# Patient Record
Sex: FEMALE
[De-identification: names, ages, dates, MRNs, and addresses within clinical notes are randomized per-mention and may not be internally consistent; named-entity substitution may affect disease eponyms.]

---

## 2019-03-01 ENCOUNTER — TELEPHONE (OUTPATIENT)
Dept: SCHEDULING | Age: 64
End: 2019-03-01

## 2020-07-30 ENCOUNTER — LAB REQUISITION (OUTPATIENT)
Dept: LAB | Facility: HOSPITAL | Age: 65
End: 2020-07-30

## 2020-07-30 ENCOUNTER — OFFICE VISIT (OUTPATIENT)
Dept: INTERNAL MEDICINE | Facility: CLINIC | Age: 65
End: 2020-07-30

## 2020-07-30 VITALS
WEIGHT: 128.4 LBS | HEART RATE: 69 BPM | DIASTOLIC BLOOD PRESSURE: 84 MMHG | OXYGEN SATURATION: 98 % | RESPIRATION RATE: 16 BRPM | SYSTOLIC BLOOD PRESSURE: 160 MMHG | BODY MASS INDEX: 25.88 KG/M2 | HEIGHT: 59 IN | TEMPERATURE: 98 F

## 2020-07-30 DIAGNOSIS — E78.2 MIXED HYPERLIPIDEMIA: ICD-10-CM

## 2020-07-30 DIAGNOSIS — Z12.39 SCREENING FOR BREAST CANCER: ICD-10-CM

## 2020-07-30 DIAGNOSIS — Z00.00 ROUTINE GENERAL MEDICAL EXAMINATION AT A HEALTH CARE FACILITY: ICD-10-CM

## 2020-07-30 DIAGNOSIS — Z83.3 FAMILY HISTORY OF DIABETES MELLITUS TYPE II: ICD-10-CM

## 2020-07-30 DIAGNOSIS — M19.90 ARTHRITIS: ICD-10-CM

## 2020-07-30 DIAGNOSIS — I10 ESSENTIAL HYPERTENSION: Primary | ICD-10-CM

## 2020-07-30 DIAGNOSIS — R01.1 MURMUR, CARDIAC: ICD-10-CM

## 2020-07-30 DIAGNOSIS — Z12.11 SCREENING FOR COLON CANCER: ICD-10-CM

## 2020-07-30 DIAGNOSIS — E55.9 VITAMIN D DEFICIENCY: ICD-10-CM

## 2020-07-30 PROCEDURE — 99203 OFFICE O/P NEW LOW 30 MIN: CPT | Performed by: NURSE PRACTITIONER

## 2020-07-30 RX ORDER — NIFEDIPINE 60 MG/1
1 TABLET, FILM COATED, EXTENDED RELEASE ORAL DAILY
COMMUNITY
End: 2020-07-30 | Stop reason: SDUPTHER

## 2020-07-30 RX ORDER — NIFEDIPINE 60 MG/1
60 TABLET, FILM COATED, EXTENDED RELEASE ORAL DAILY
Qty: 90 TABLET | Refills: 1 | Status: SHIPPED | OUTPATIENT
Start: 2020-07-30 | End: 2021-01-12

## 2020-07-30 RX ORDER — AMOXICILLIN 500 MG/1
4 CAPSULE ORAL
COMMUNITY
End: 2021-03-08

## 2020-07-30 RX ORDER — BENAZEPRIL HYDROCHLORIDE 40 MG/1
1 TABLET, FILM COATED ORAL DAILY
COMMUNITY
End: 2020-07-30 | Stop reason: SDUPTHER

## 2020-07-30 RX ORDER — SIMVASTATIN 20 MG
20 TABLET ORAL DAILY
Qty: 90 TABLET | Refills: 1 | Status: SHIPPED | OUTPATIENT
Start: 2020-07-30 | End: 2020-08-31

## 2020-07-30 RX ORDER — MELOXICAM 15 MG/1
1 TABLET ORAL DAILY
COMMUNITY
Start: 2018-10-15 | End: 2020-08-31

## 2020-07-30 RX ORDER — BENAZEPRIL HYDROCHLORIDE 40 MG/1
40 TABLET, FILM COATED ORAL DAILY
Qty: 90 TABLET | Refills: 1 | Status: SHIPPED | OUTPATIENT
Start: 2020-07-30 | End: 2021-03-08 | Stop reason: SDUPTHER

## 2020-07-30 RX ORDER — SIMVASTATIN 20 MG
1 TABLET ORAL DAILY
COMMUNITY
End: 2020-07-30 | Stop reason: SDUPTHER

## 2020-07-30 NOTE — PROGRESS NOTES
Subjective   Greta Marino is a 65 y.o. female    Chief Complaint   Patient presents with   • Establish Care   • Hypertension   • Hyperlipidemia   • Arthritis   • Med Refill     History of Present Illness     New pt here to establish care.      HTN - chronic; pt has not had her BP med this AM.  She moved to [place] from [place] 6 months ago and has been spacing her BP meds out.  States that generally her BP is well controlled on Benazepril 40 mg daily along with Nifedipine 60 mg daily.      Murmur - found in 2005 while having a colonoscopy; originally had a echo, but it has not been repeated since 2005.      HL - chronic; current regimen is Simvastatin 20 mg daily.  No recent labs, but she is fasting this AM.      Arthritis - chronic; most pain is within the hand and bilateral knees.  Takes Meloxicam 15 mg PRN daily.  She did have a meniscus repair in 12/2018 and knee pain is much better since surgery.  She is aware Meloxicam can elevate her BP.      Past Medical History:   Diagnosis Date   • Arthritis    • Asthma    • Heart murmur    • History of mammography, screening 2017    Done in [place]    • History of Papanicolaou smear of cervix 2017    Done in [place]   • History of shingles 2015   • Hyperlipidemia    • Hypertension      Past Surgical History:   Procedure Laterality Date   • ANKLE SURGERY Left 1980's   • COLONOSCOPY  2005    Dr. Spence in [place], [place]   • KNEE SURGERY Right 12/20/2018    meniscus repair     Allergies   Allergen Reactions   • Peanut-Containing Drug Products Hives     Family History   Problem Relation Age of Onset   • Pancreatic cancer Mother         Passed away in 2002   • Diabetes Father    • Heart attack Father         passed in 2000   • Hypertension Father    • Colon cancer Father    • Diabetes Brother    • Hypertension Brother    • Hypertension Brother    • Stroke Brother    • Sarcoidosis Daughter      Social History     Socioeconomic History   • Marital status:      Spouse name:  Not on file   • Number of children: Not on file   • Years of education: Not on file   • Highest education level: Not on file   Tobacco Use   • Smoking status: Never Smoker   • Smokeless tobacco: Never Used   Substance and Sexual Activity   • Alcohol use: Yes     Frequency: Monthly or less     Comment: on occasion   • Drug use: Never         The following portions of the patient's history were reviewed and updated as appropriate: allergies, current medications, past family history, past medical history, past social history, past surgical history and problem list.    Current Outpatient Medications:   •  benazepril (LOTENSIN) 40 MG tablet, Take 1 tablet by mouth Daily., Disp: 90 tablet, Rfl: 1  •  meloxicam (MOBIC) 15 MG tablet, Take 1 tablet by mouth Daily., Disp: , Rfl:   •  NIFEdipine CC (ADALAT CC) 60 MG 24 hr tablet, Take 1 tablet by mouth Daily., Disp: 90 tablet, Rfl: 1  •  simvastatin (ZOCOR) 20 MG tablet, Take 1 tablet by mouth Daily., Disp: 90 tablet, Rfl: 1  •  amoxicillin (AMOXIL) 500 MG capsule, Take 4 capsules by mouth. 1 hour Prior to dental appoitment., Disp: , Rfl:      Review of Systems   Constitutional: Negative for chills, fatigue and fever.   Respiratory: Negative for cough, chest tightness and shortness of breath.    Cardiovascular: Negative for chest pain and palpitations.   Gastrointestinal: Negative for abdominal pain, diarrhea, nausea and vomiting.   Endocrine: Negative for cold intolerance and heat intolerance.   Musculoskeletal: Positive for arthralgias. Negative for neck pain.   Neurological: Negative for dizziness and headaches.       Objective   Physical Exam   Constitutional: She is oriented to person, place, and time. She appears well-developed and well-nourished.   HENT:   Head: Normocephalic and atraumatic.   Eyes: Pupils are equal, round, and reactive to light. Conjunctivae and EOM are normal.   Neck: Normal range of motion.   Cardiovascular: Normal rate and regular rhythm.   Murmur  "heard.   Systolic murmur is present with a grade of 3/6.  Pulmonary/Chest: Effort normal and breath sounds normal.   Abdominal: Soft. Bowel sounds are normal.   Musculoskeletal: Normal range of motion.   Neurological: She is alert and oriented to person, place, and time. She has normal reflexes.   Skin: Skin is warm and dry.   Psychiatric: She has a normal mood and affect. Her behavior is normal. Judgment and thought content normal.     Vitals:    07/30/20 1005 07/30/20 1130   BP: 170/72 160/84   Pulse: 69    Resp: 16    Temp: 98 °F (36.7 °C)    TempSrc: Infrared    SpO2: 98%    Weight: 58.2 kg (128 lb 6.4 oz)    Height: 149.4 cm (58.8\")          Assessment/Plan   Greta was seen today for establish care, hypertension, hyperlipidemia, arthritis and med refill.    Diagnoses and all orders for this visit:    Essential hypertension  -     NIFEdipine CC (ADALAT CC) 60 MG 24 hr tablet; Take 1 tablet by mouth Daily.  -     benazepril (LOTENSIN) 40 MG tablet; Take 1 tablet by mouth Daily.  -     simvastatin (ZOCOR) 20 MG tablet; Take 1 tablet by mouth Daily.  -     CBC & Differential; Future  -     Comprehensive Metabolic Panel; Future  -     Lipid Panel; Future  -     TSH; Future  -     Vitamin B12; Future  -     Vitamin D 25 Hydroxy; Future  -     Hemoglobin A1c; Future  -     Hemoglobin A1c  -     Vitamin D 25 Hydroxy  -     Vitamin B12  -     TSH  -     Lipid Panel  -     Comprehensive Metabolic Panel  -     CBC & Differential    Mixed hyperlipidemia  -     NIFEdipine CC (ADALAT CC) 60 MG 24 hr tablet; Take 1 tablet by mouth Daily.  -     benazepril (LOTENSIN) 40 MG tablet; Take 1 tablet by mouth Daily.  -     simvastatin (ZOCOR) 20 MG tablet; Take 1 tablet by mouth Daily.  -     CBC & Differential; Future  -     Comprehensive Metabolic Panel; Future  -     Lipid Panel; Future  -     TSH; Future  -     Vitamin B12; Future  -     Vitamin D 25 Hydroxy; Future  -     Hemoglobin A1c; Future  -     Hemoglobin A1c  -     " Vitamin D 25 Hydroxy  -     Vitamin B12  -     TSH  -     Lipid Panel  -     Comprehensive Metabolic Panel  -     CBC & Differential    Arthritis  -     CBC & Differential; Future  -     Comprehensive Metabolic Panel; Future  -     Lipid Panel; Future  -     TSH; Future  -     Vitamin B12; Future  -     Vitamin D 25 Hydroxy; Future  -     Hemoglobin A1c; Future  -     Hemoglobin A1c  -     Vitamin D 25 Hydroxy  -     Vitamin B12  -     TSH  -     Lipid Panel  -     Comprehensive Metabolic Panel  -     CBC & Differential    Vitamin D deficiency  -     CBC & Differential; Future  -     Comprehensive Metabolic Panel; Future  -     Lipid Panel; Future  -     TSH; Future  -     Vitamin B12; Future  -     Vitamin D 25 Hydroxy; Future  -     Hemoglobin A1c; Future  -     Hemoglobin A1c  -     Vitamin D 25 Hydroxy  -     Vitamin B12  -     TSH  -     Lipid Panel  -     Comprehensive Metabolic Panel  -     CBC & Differential    Family history of diabetes mellitus type II  -     Hemoglobin A1c; Future  -     Hemoglobin A1c    Screening for colon cancer  -     Ambulatory Referral For Screening Colonoscopy    Screening for breast cancer  -     Mammo Screening Digital Tomosynthesis Bilateral With CAD; Future    Murmur, cardiac  -     Adult Transthoracic Echo Complete W/ Cont if Necessary Per Protocol; Future      Labs sent today  Meds refilled  Keep BP log  Mamm ordered  Colon ordered  Will ck Echo  Return in about 4 weeks (around 8/27/2020) for BP follow up.

## 2020-07-31 LAB
25(OH)D3+25(OH)D2 SERPL-MCNC: 26.2 NG/ML (ref 30–100)
ALBUMIN SERPL-MCNC: 4.8 G/DL (ref 3.5–5.2)
ALBUMIN/GLOB SERPL: 2.3 G/DL
ALP SERPL-CCNC: 71 U/L (ref 39–117)
ALT SERPL-CCNC: 12 U/L (ref 1–33)
AST SERPL-CCNC: 17 U/L (ref 1–32)
BASOPHILS # BLD AUTO: 0.04 10*3/MM3 (ref 0–0.2)
BASOPHILS NFR BLD AUTO: 0.8 % (ref 0–1.5)
BILIRUB SERPL-MCNC: 0.5 MG/DL (ref 0–1.2)
BUN SERPL-MCNC: 20 MG/DL (ref 8–23)
BUN/CREAT SERPL: 27 (ref 7–25)
CALCIUM SERPL-MCNC: 9.5 MG/DL (ref 8.6–10.5)
CHLORIDE SERPL-SCNC: 101 MMOL/L (ref 98–107)
CHOLEST SERPL-MCNC: 206 MG/DL (ref 0–200)
CO2 SERPL-SCNC: 25.4 MMOL/L (ref 22–29)
CREAT SERPL-MCNC: 0.74 MG/DL (ref 0.57–1)
EOSINOPHIL # BLD AUTO: 0.11 10*3/MM3 (ref 0–0.4)
EOSINOPHIL NFR BLD AUTO: 2.1 % (ref 0.3–6.2)
ERYTHROCYTE [DISTWIDTH] IN BLOOD BY AUTOMATED COUNT: 12.6 % (ref 12.3–15.4)
GLOBULIN SER CALC-MCNC: 2.1 GM/DL
GLUCOSE SERPL-MCNC: 57 MG/DL (ref 65–99)
HBA1C MFR BLD: 5 % (ref 4.8–5.6)
HCT VFR BLD AUTO: 41.2 % (ref 34–46.6)
HDLC SERPL-MCNC: 41 MG/DL (ref 40–60)
HGB BLD-MCNC: 13.7 G/DL (ref 12–15.9)
IMM GRANULOCYTES # BLD AUTO: 0.02 10*3/MM3 (ref 0–0.05)
IMM GRANULOCYTES NFR BLD AUTO: 0.4 % (ref 0–0.5)
LDLC SERPL CALC-MCNC: 147 MG/DL (ref 0–100)
LYMPHOCYTES # BLD AUTO: 1.47 10*3/MM3 (ref 0.7–3.1)
LYMPHOCYTES NFR BLD AUTO: 27.7 % (ref 19.6–45.3)
MCH RBC QN AUTO: 32.2 PG (ref 26.6–33)
MCHC RBC AUTO-ENTMCNC: 33.3 G/DL (ref 31.5–35.7)
MCV RBC AUTO: 96.9 FL (ref 79–97)
MONOCYTES # BLD AUTO: 0.31 10*3/MM3 (ref 0.1–0.9)
MONOCYTES NFR BLD AUTO: 5.8 % (ref 5–12)
NEUTROPHILS # BLD AUTO: 3.36 10*3/MM3 (ref 1.7–7)
NEUTROPHILS NFR BLD AUTO: 63.2 % (ref 42.7–76)
NRBC BLD AUTO-RTO: 0 /100 WBC (ref 0–0.2)
PLATELET # BLD AUTO: 205 10*3/MM3 (ref 140–450)
POTASSIUM SERPL-SCNC: 5 MMOL/L (ref 3.5–5.2)
PROT SERPL-MCNC: 6.9 G/DL (ref 6–8.5)
RBC # BLD AUTO: 4.25 10*6/MM3 (ref 3.77–5.28)
SODIUM SERPL-SCNC: 138 MMOL/L (ref 136–145)
TRIGL SERPL-MCNC: 92 MG/DL (ref 0–150)
TSH SERPL DL<=0.005 MIU/L-ACNC: 0.8 UIU/ML (ref 0.27–4.2)
VIT B12 SERPL-MCNC: 383 PG/ML (ref 211–946)
VLDLC SERPL CALC-MCNC: 18.4 MG/DL
WBC # BLD AUTO: 5.31 10*3/MM3 (ref 3.4–10.8)

## 2020-08-17 ENCOUNTER — HOSPITAL ENCOUNTER (OUTPATIENT)
Dept: CARDIOLOGY | Facility: HOSPITAL | Age: 65
Discharge: HOME | End: 2020-08-17
Admitting: NURSE PRACTITIONER

## 2020-08-17 VITALS — BODY MASS INDEX: 26.87 KG/M2 | HEIGHT: 58 IN | WEIGHT: 128 LBS

## 2020-08-17 DIAGNOSIS — R01.1 MURMUR, CARDIAC: ICD-10-CM

## 2020-08-17 DIAGNOSIS — Z12.11 SCREENING FOR COLON CANCER: Primary | ICD-10-CM

## 2020-08-17 LAB
ASCENDING AORTA: 2.7 CM
BH CV ECHO MEAS - AI DEC SLOPE: 277.3 CM/SEC^2
BH CV ECHO MEAS - AI MAX PG: 77.7 MMHG
BH CV ECHO MEAS - AI MAX VEL: 439 CM/SEC
BH CV ECHO MEAS - AI P1/2T: 463.6 MSEC
BH CV ECHO MEAS - AO MAX PG (FULL): 70.2 MMHG
BH CV ECHO MEAS - AO MAX PG: 75 MMHG
BH CV ECHO MEAS - AO MEAN PG (FULL): 40.5 MMHG
BH CV ECHO MEAS - AO MEAN PG: 42.5 MMHG
BH CV ECHO MEAS - AO ROOT AREA (BSA CORRECTED): 1.9
BH CV ECHO MEAS - AO ROOT AREA: 6.2 CM^2
BH CV ECHO MEAS - AO ROOT DIAM: 2.8 CM
BH CV ECHO MEAS - AO V2 MAX: 431.5 CM/SEC
BH CV ECHO MEAS - AO V2 MEAN: 304.5 CM/SEC
BH CV ECHO MEAS - AO V2 VTI: 98.3 CM
BH CV ECHO MEAS - AVA(I,A): 0.83 CM^2
BH CV ECHO MEAS - AVA(I,D): 0.83 CM^2
BH CV ECHO MEAS - AVA(V,A): 0.79 CM^2
BH CV ECHO MEAS - AVA(V,D): 0.79 CM^2
BH CV ECHO MEAS - BSA(HAYCOCK): 1.6 M^2
BH CV ECHO MEAS - BSA: 1.5 M^2
BH CV ECHO MEAS - BZI_BMI: 26.8 KILOGRAMS/M^2
BH CV ECHO MEAS - BZI_METRIC_HEIGHT: 147.3 CM
BH CV ECHO MEAS - BZI_METRIC_WEIGHT: 58.1 KG
BH CV ECHO MEAS - EDV(CUBED): 61.6 ML
BH CV ECHO MEAS - EDV(MOD-SP2): 110 ML
BH CV ECHO MEAS - EDV(MOD-SP4): 90 ML
BH CV ECHO MEAS - EDV(TEICH): 67.9 ML
BH CV ECHO MEAS - EF(CUBED): 70.1 %
BH CV ECHO MEAS - EF(MOD-BP): 68 %
BH CV ECHO MEAS - EF(MOD-SP2): 72.7 %
BH CV ECHO MEAS - EF(MOD-SP4): 60 %
BH CV ECHO MEAS - EF(TEICH): 62.4 %
BH CV ECHO MEAS - ESV(CUBED): 18.4 ML
BH CV ECHO MEAS - ESV(MOD-SP2): 30 ML
BH CV ECHO MEAS - ESV(MOD-SP4): 36 ML
BH CV ECHO MEAS - ESV(TEICH): 25.6 ML
BH CV ECHO MEAS - FS: 33.2 %
BH CV ECHO MEAS - IVS/LVPW: 1.1
BH CV ECHO MEAS - IVSD: 1 CM
BH CV ECHO MEAS - LAD MAJOR: 4.1 CM
BH CV ECHO MEAS - LAT PEAK E' VEL: 10.4 CM/SEC
BH CV ECHO MEAS - LATERAL E/E' RATIO: 6.7
BH CV ECHO MEAS - LV DIASTOLIC VOL/BSA (35-75): 59.7 ML/M^2
BH CV ECHO MEAS - LV IVRT: 0.11 SEC
BH CV ECHO MEAS - LV MASS(C)D: 120 GRAMS
BH CV ECHO MEAS - LV MASS(C)DI: 79.7 GRAMS/M^2
BH CV ECHO MEAS - LV MAX PG: 4.8 MMHG
BH CV ECHO MEAS - LV MEAN PG: 2 MMHG
BH CV ECHO MEAS - LV SYSTOLIC VOL/BSA (12-30): 23.9 ML/M^2
BH CV ECHO MEAS - LV V1 MAX: 109 CM/SEC
BH CV ECHO MEAS - LV V1 MEAN: 70.7 CM/SEC
BH CV ECHO MEAS - LV V1 VTI: 25.9 CM
BH CV ECHO MEAS - LVIDD: 4 CM
BH CV ECHO MEAS - LVIDS: 2.6 CM
BH CV ECHO MEAS - LVLD AP2: 8.5 CM
BH CV ECHO MEAS - LVLD AP4: 7.8 CM
BH CV ECHO MEAS - LVLS AP2: 6.1 CM
BH CV ECHO MEAS - LVLS AP4: 6.4 CM
BH CV ECHO MEAS - LVOT AREA (M): 3.1 CM^2
BH CV ECHO MEAS - LVOT AREA: 3.1 CM^2
BH CV ECHO MEAS - LVOT DIAM: 2 CM
BH CV ECHO MEAS - LVPWD: 0.9 CM
BH CV ECHO MEAS - MED PEAK E' VEL: 8 CM/SEC
BH CV ECHO MEAS - MEDIAL E/E' RATIO: 8.7
BH CV ECHO MEAS - MV A MAX VEL: 80.9 CM/SEC
BH CV ECHO MEAS - MV DEC SLOPE: 298 CM/SEC^2
BH CV ECHO MEAS - MV DEC TIME: 0.2 SEC
BH CV ECHO MEAS - MV E MAX VEL: 69.6 CM/SEC
BH CV ECHO MEAS - MV E/A: 0.86
BH CV ECHO MEAS - MV P1/2T MAX VEL: 92.3 CM/SEC
BH CV ECHO MEAS - MV P1/2T: 90.7 MSEC
BH CV ECHO MEAS - MVA P1/2T LCG: 2.4 CM^2
BH CV ECHO MEAS - MVA(P1/2T): 2.4 CM^2
BH CV ECHO MEAS - PA ACC SLOPE: 584 CM/SEC^2
BH CV ECHO MEAS - PA ACC TIME: 0.15 SEC
BH CV ECHO MEAS - PA MAX PG: 4.2 MMHG
BH CV ECHO MEAS - PA PR(ACCEL): 11.1 MMHG
BH CV ECHO MEAS - PA V2 MAX: 103 CM/SEC
BH CV ECHO MEAS - RAP SYSTOLE: 3 MMHG
BH CV ECHO MEAS - RVSP: 23 MMHG
BH CV ECHO MEAS - SI(AO): 401.7 ML/M^2
BH CV ECHO MEAS - SI(CUBED): 28.7 ML/M^2
BH CV ECHO MEAS - SI(LVOT): 54 ML/M^2
BH CV ECHO MEAS - SI(MOD-SP2): 53.1 ML/M^2
BH CV ECHO MEAS - SI(MOD-SP4): 35.8 ML/M^2
BH CV ECHO MEAS - SI(TEICH): 28.1 ML/M^2
BH CV ECHO MEAS - SV(AO): 605.3 ML
BH CV ECHO MEAS - SV(CUBED): 43.2 ML
BH CV ECHO MEAS - SV(LVOT): 81.4 ML
BH CV ECHO MEAS - SV(MOD-SP2): 80 ML
BH CV ECHO MEAS - SV(MOD-SP4): 54 ML
BH CV ECHO MEAS - SV(TEICH): 42.4 ML
BH CV ECHO MEAS - TAPSE (>1.6): 2 CM2
BH CV ECHO MEAS - TR MAX PG: 20 MMHG
BH CV ECHO MEAS - TR MAX VEL: 221 CM/SEC
BH CV ECHO MEASUREMENTS AVERAGE E/E' RATIO: 7.57
BH CV VAS BP RIGHT ARM: NORMAL MMHG
BH CV XLRA - RV BASE: 2.7 CM
BH CV XLRA - RV LENGTH: 6.8 CM
BH CV XLRA - RV MID: 2.9 CM
BH CV XLRA - TDI S': 12.2 CM/SEC
LEFT ATRIUM VOLUME INDEX: 23.2 ML/M^2
LEFT ATRIUM VOLUME: 35 ML
LV EF 2D ECHO EST: 66 %

## 2020-08-17 PROCEDURE — 93306 TTE W/DOPPLER COMPLETE: CPT | Performed by: INTERNAL MEDICINE

## 2020-08-17 PROCEDURE — 93306 TTE W/DOPPLER COMPLETE: CPT

## 2020-08-19 ENCOUNTER — TELEPHONE (OUTPATIENT)
Dept: INTERNAL MEDICINE | Facility: CLINIC | Age: 65
End: 2020-08-19

## 2020-08-19 NOTE — TELEPHONE ENCOUNTER
----- Message from LIBBY López sent at 8/14/2020 11:17 AM EDT -----  Cholesterol is too high.  LDL is 147 and should be less than 100.  She consistently taking her simvastatin?    Vitamin D is low.  I recommend OTC vitamin D3 2000 units daily    A1c was low at 5.0%.  Blood sugar was actually slightly low at 57.  Make sure she is eating small frequent meals with a protein and a carbohydrate.    All other labs are within acceptable limits

## 2020-08-21 ENCOUNTER — APPOINTMENT (OUTPATIENT)
Dept: PREADMISSION TESTING | Facility: HOSPITAL | Age: 65
End: 2020-08-21

## 2020-08-21 PROCEDURE — C9803 HOPD COVID-19 SPEC COLLECT: HCPCS

## 2020-08-21 PROCEDURE — U0004 COV-19 TEST NON-CDC HGH THRU: HCPCS

## 2020-08-21 PROCEDURE — U0002 COVID-19 LAB TEST NON-CDC: HCPCS

## 2020-08-22 LAB
REF LAB TEST METHOD: NORMAL
SARS-COV-2 RNA RESP QL NAA+PROBE: NOT DETECTED

## 2020-08-24 ENCOUNTER — OUTSIDE FACILITY SERVICE (OUTPATIENT)
Dept: GASTROENTEROLOGY | Facility: CLINIC | Age: 65
End: 2020-08-24

## 2020-08-24 PROCEDURE — 45380 COLONOSCOPY AND BIOPSY: CPT | Performed by: INTERNAL MEDICINE

## 2020-08-24 PROCEDURE — 88305 TISSUE EXAM BY PATHOLOGIST: CPT | Performed by: INTERNAL MEDICINE

## 2020-08-24 PROCEDURE — 45385 COLONOSCOPY W/LESION REMOVAL: CPT | Performed by: INTERNAL MEDICINE

## 2020-08-25 ENCOUNTER — LAB REQUISITION (OUTPATIENT)
Dept: LAB | Facility: HOSPITAL | Age: 65
End: 2020-08-25

## 2020-08-25 DIAGNOSIS — Z12.11 ENCOUNTER FOR SCREENING FOR MALIGNANT NEOPLASM OF COLON: ICD-10-CM

## 2020-08-25 DIAGNOSIS — Z80.0 FAMILY HISTORY OF MALIGNANT NEOPLASM OF DIGESTIVE ORGANS: ICD-10-CM

## 2020-08-25 DIAGNOSIS — Z83.71 FAMILY HISTORY OF COLONIC POLYPS: ICD-10-CM

## 2020-08-26 LAB
CYTO UR: NORMAL
LAB AP CASE REPORT: NORMAL
LAB AP CLINICAL INFORMATION: NORMAL
LAB AP DIAGNOSIS COMMENT: NORMAL
PATH REPORT.FINAL DX SPEC: NORMAL
PATH REPORT.GROSS SPEC: NORMAL

## 2020-08-27 ENCOUNTER — TELEPHONE (OUTPATIENT)
Dept: GASTROENTEROLOGY | Facility: CLINIC | Age: 65
End: 2020-08-27

## 2020-08-27 NOTE — TELEPHONE ENCOUNTER
----- Message from Steven Bowling MD sent at 8/27/2020 12:02 PM EDT -----  The pathology findings show that your descending colon polyp is an adenoma which has precancerous cells in this location without cancer.  This means that the polyp might have become cancerous if it was not removed. We would recommend a repeat colonoscopy in 3 years due to a combination of both the endoscopic results and what our pathologists saw under the microscope.    Thank you,    Dr. Bowling

## 2020-08-28 ENCOUNTER — TELEPHONE (OUTPATIENT)
Dept: INTERNAL MEDICINE | Facility: CLINIC | Age: 65
End: 2020-08-28

## 2020-08-28 DIAGNOSIS — I35.0 AORTIC VALVE STENOSIS, ETIOLOGY OF CARDIAC VALVE DISEASE UNSPECIFIED: Primary | ICD-10-CM

## 2020-08-28 DIAGNOSIS — I35.1 AORTIC VALVE INSUFFICIENCY, ETIOLOGY OF CARDIAC VALVE DISEASE UNSPECIFIED: ICD-10-CM

## 2020-08-28 NOTE — TELEPHONE ENCOUNTER
----- Message from LIBBY López sent at 8/28/2020  4:22 PM EDT -----  Please let pt know that her echo shows moderate aortic valve regurgitation and severe aortic valve stenosis.  I am going to refer her to cardiology for monitoring.

## 2020-08-31 ENCOUNTER — OFFICE VISIT (OUTPATIENT)
Dept: INTERNAL MEDICINE | Facility: CLINIC | Age: 65
End: 2020-08-31

## 2020-08-31 VITALS
BODY MASS INDEX: 25.68 KG/M2 | DIASTOLIC BLOOD PRESSURE: 68 MMHG | WEIGHT: 127.4 LBS | SYSTOLIC BLOOD PRESSURE: 120 MMHG | TEMPERATURE: 97.3 F | OXYGEN SATURATION: 97 % | HEIGHT: 59 IN | HEART RATE: 68 BPM | RESPIRATION RATE: 16 BRPM

## 2020-08-31 DIAGNOSIS — I35.1 AORTIC VALVE INSUFFICIENCY, ETIOLOGY OF CARDIAC VALVE DISEASE UNSPECIFIED: ICD-10-CM

## 2020-08-31 DIAGNOSIS — I10 ESSENTIAL HYPERTENSION: Primary | ICD-10-CM

## 2020-08-31 DIAGNOSIS — E78.2 MIXED HYPERLIPIDEMIA: ICD-10-CM

## 2020-08-31 DIAGNOSIS — Z23 NEED FOR INFLUENZA VACCINATION: ICD-10-CM

## 2020-08-31 DIAGNOSIS — I35.0 AORTIC VALVE STENOSIS, ETIOLOGY OF CARDIAC VALVE DISEASE UNSPECIFIED: ICD-10-CM

## 2020-08-31 PROCEDURE — 90471 IMMUNIZATION ADMIN: CPT | Performed by: NURSE PRACTITIONER

## 2020-08-31 PROCEDURE — 90686 IIV4 VACC NO PRSV 0.5 ML IM: CPT | Performed by: NURSE PRACTITIONER

## 2020-08-31 PROCEDURE — 99214 OFFICE O/P EST MOD 30 MIN: CPT | Performed by: NURSE PRACTITIONER

## 2020-08-31 RX ORDER — SIMVASTATIN 40 MG
40 TABLET ORAL NIGHTLY
Qty: 90 TABLET | Refills: 1 | Status: SHIPPED | OUTPATIENT
Start: 2020-08-31 | End: 2021-03-08 | Stop reason: SDUPTHER

## 2020-08-31 NOTE — PROGRESS NOTES
Subjective   Greta Marino is a 65 y.o. female    Chief Complaint   Patient presents with   • 4 week follow up   • Hypertension     History of Present Illness     Here for f/u    HTN - has been consistently taking BP meds (nifedipine and benazepril) as directed since last visit.  BP looks great today.  Denies any medication SE's    HL - chronic; currently taking Simvastatin 20 mg daily  Lab Results   Component Value Date    CHLPL 206 (H) 07/30/2020    TRIG 92 07/30/2020    HDL 41 07/30/2020     (H) 07/30/2020   we will increase the simvastatin to 40 mg daily      Pt had recent echo to evaluate Murmur.  Results for orders placed during the hospital encounter of 08/17/20   Adult Transthoracic Echo Complete W/ Cont if Necessary Per Protocol    Narrative · Moderate aortic valve regurgitation is present.  · Severe aortic valve stenosis is present.  · Mild mitral valve regurgitation is present.  · Mild tricuspid valve regurgitation is present.  · Calculated right ventricular systolic pressure from tricuspid   regurgitation is 23 mmHg.  · Estimated EF = 66%.  · Left ventricular systolic function is normal.  · Left ventricular diastolic dysfunction (grade I) consistent with   impaired relaxation.  · Normal right ventricular cavity size, wall thickness, systolic function   and septal motion noted.  · No evidence of pulmonary hypertension is present.  · There is no evidence of pericardial effusion.  · Mean aortic valve gradient 42.5 torr.            The following portions of the patient's history were reviewed and updated as appropriate: allergies, current medications, past family history, past medical history, past social history, past surgical history and problem list.    Current Outpatient Medications:   •  benazepril (LOTENSIN) 40 MG tablet, Take 1 tablet by mouth Daily., Disp: 90 tablet, Rfl: 1  •  NIFEdipine CC (ADALAT CC) 60 MG 24 hr tablet, Take 1 tablet by mouth Daily., Disp: 90 tablet, Rfl: 1  •  amoxicillin  "(AMOXIL) 500 MG capsule, Take 4 capsules by mouth. 1 hour Prior to dental appoitment., Disp: , Rfl:   •  simvastatin (Zocor) 40 MG tablet, Take 1 tablet by mouth Every Night., Disp: 90 tablet, Rfl: 1     Review of Systems   Constitutional: Negative for chills, fatigue and fever.   Respiratory: Negative for cough, chest tightness and shortness of breath.    Cardiovascular: Negative for chest pain.   Gastrointestinal: Negative for abdominal pain, diarrhea, nausea and vomiting.   Endocrine: Negative for cold intolerance and heat intolerance.   Musculoskeletal: Negative for arthralgias.   Neurological: Negative for dizziness.       Objective   Physical Exam   Constitutional: She is oriented to person, place, and time. She appears well-developed and well-nourished.   HENT:   Head: Normocephalic and atraumatic.   Eyes: Pupils are equal, round, and reactive to light. Conjunctivae and EOM are normal.   Neck: Normal range of motion.   Cardiovascular: Normal rate and regular rhythm.   Murmur heard.   Systolic murmur is present with a grade of 3/6.  Pulmonary/Chest: Effort normal and breath sounds normal.   Abdominal: Soft. Bowel sounds are normal.   Musculoskeletal: Normal range of motion.   Neurological: She is alert and oriented to person, place, and time. She has normal reflexes.   Skin: Skin is warm and dry.   Psychiatric: She has a normal mood and affect. Her behavior is normal. Judgment and thought content normal.     Vitals:    08/31/20 0930   BP: 120/68   Pulse: 68   Resp: 16   Temp: 97.3 °F (36.3 °C)   TempSrc: Infrared   SpO2: 97%   Weight: 57.8 kg (127 lb 6.4 oz)   Height: 149.4 cm (58.82\")         Assessment/Plan   Greta was seen today for 4 week follow up and hypertension.    Diagnoses and all orders for this visit:    Essential hypertension    Mixed hyperlipidemia  -     simvastatin (Zocor) 40 MG tablet; Take 1 tablet by mouth Every Night.    Aortic valve stenosis, etiology of cardiac valve disease " unspecified    Aortic valve insufficiency, etiology of cardiac valve disease unspecified    Need for influenza vaccination  -     FluLaval Quad >6 Months ([id number])      Continue BP meds unchanged  Simvastatin increased to 40 mg daily  Referral has been entered to Cardiology  Flu shot updated  Return in about 6 months (around 2/28/2021).

## 2020-10-13 PROBLEM — I35.1 NONRHEUMATIC AORTIC VALVE INSUFFICIENCY: Status: ACTIVE | Noted: 2020-10-13

## 2020-10-13 PROBLEM — I35.0 NONRHEUMATIC AORTIC VALVE STENOSIS: Status: ACTIVE | Noted: 2020-10-13

## 2020-10-16 NOTE — PROGRESS NOTES
Subjective:     Encounter Date:10/19/2020    Primary Care Physician: Alia Vasquez APRN      Patient ID: Greta Marino is a 65 y.o. female.Answers for HPI/ROS submitted by the patient on 10/17/2020   What is the primary reason for your visit?: Other  Please describe your symptoms.: To go over all recent tests and procedures. Initial visit-Consultation  Have you had these symptoms before?: Yes  How long have you been having these symptoms?: Greater than 2 weeks  Please list any medications you are currently taking for this condition.: Nifedipine, Simvastatin and Benazepril      Chief Complaint:AORTIC VALVE STENOSIS    PROBLEM LIST:  1. Aortic stenosis        A. Echo (8/17/2020): Normal LVEF 66%.  Moderate AI and severe AS with mean gradient 42 mmHg.  Mild MR and TR.  RVSP 23  mmHg. Grade 1 diastolic dysfunction  2.  Hypertension  3.  Hyperlipidemia    Past Medical History:   Diagnosis Date   • Arthritis    • Asthma    • Heart murmur    • History of mammography, screening 2017    Done in [place]    • History of Papanicolaou smear of cervix 2017    Done in [place]   • History of shingles 2015   • Hyperlipidemia    • Hypertension      Past Surgical History:   Procedure Laterality Date   • ANKLE SURGERY Left 1980's   • COLONOSCOPY  8/24/2020, 2005 8/24/2020- Dr. Bowling. 2005-Dr. Spence in [place], [place]   • KNEE SURGERY Right 12/20/2018    meniscus repair       Allergies   Allergen Reactions   • Peanut-Containing Drug Products Hives         Current Outpatient Medications:   •  amoxicillin (AMOXIL) 500 MG capsule, Take 4 capsules by mouth. 1 hour Prior to dental appoitment., Disp: , Rfl:   •  benazepril (LOTENSIN) 40 MG tablet, Take 1 tablet by mouth Daily., Disp: 90 tablet, Rfl: 1  •  NIFEdipine CC (ADALAT CC) 60 MG 24 hr tablet, Take 1 tablet by mouth Daily., Disp: 90 tablet, Rfl: 1  •  simvastatin (Zocor) 40 MG tablet, Take 1 tablet by mouth Every Night., Disp: 90 tablet, Rfl: 1        History of Present  Illness    Patient is a 65-year-old female who is being referred by LIBBY Lynn for abnormal echocardiogram and murmur.  Patient had an echocardiogram in August that showed severe aortic stenosis and moderate aortic insufficiency.  Mean gradient was 42 mmHg.  She had a normal LVEF of 66%.  According to the patient she has known since  that she had a murmur and aortic valve issues.  Over the years she has moved around the [place] frequently and seen many cardiologists but has not followed cardiology since .  At that time she did have a transesophageal echocardiogram as a baseline but we do not have that report.  The patient denies any chest pain, dyspnea, orthopnea, palpitations or syncope.  She reports being very active without any physical limitations.  The patient relocated from [place] to [place] last January and currently resides with her son and daughter-in-law.  She does work part-time at [employer] on [address].  She denies smoking but does drink a glass of wine on rare occasions.  Her father  of a massive heart attack and her brother had a stroke.  She has amoxicillin prescribed to her to use as needed prior to dental procedures.  She is still wondering if she needs to do this.  Her blood pressures are well controlled.  She does take daily statin therapy and tolerates without myalgias.  Last lipid panel in July showed total cholesterol and LDL still elevated.    The following portions of the patient's history were reviewed and updated as appropriate: allergies, current medications, past family history, past medical history, past social history, past surgical history and problem list.    Family History   Problem Relation Age of Onset   • Pancreatic cancer Mother         Passed away in    • Diabetes Father    • Heart attack Father         passed in    • Hypertension Father    • Colon cancer Father    • Diabetes Brother    • Hypertension Brother    • Hypertension  "Brother    • Stroke Brother    • Sarcoidosis Daughter        Social History     Tobacco Use   • Smoking status: Never Smoker   • Smokeless tobacco: Never Used   Substance Use Topics   • Alcohol use: Yes     Frequency: Monthly or less     Comment: on occasion   • Drug use: Never         Review of Systems   Constitution: Negative for malaise/fatigue.   Eyes: Negative for vision loss in left eye and vision loss in right eye.   Cardiovascular: Negative for chest pain, dyspnea on exertion, near-syncope, orthopnea, palpitations, paroxysmal nocturnal dyspnea and syncope.   Musculoskeletal: Negative for myalgias.   Neurological: Negative for brief paralysis, excessive daytime sleepiness, focal weakness, numbness, paresthesias and weakness.   All other systems reviewed and are negative.         Objective:   /60 (BP Location: Right arm, Patient Position: Sitting)   Pulse 70   Ht 149.9 cm (59\")   Wt 57.2 kg (126 lb)   BMI 25.45 kg/m²         Constitutional:       Appearance: Healthy appearance. Well-developed.   Eyes:      General: Lids are normal. No scleral icterus.     Conjunctiva/sclera: Conjunctivae normal.   HENT:      Head: Normocephalic and atraumatic.   Neck:      Musculoskeletal: Normal range of motion.      Thyroid: No thyromegaly.      Vascular: No carotid bruit or JVD.   Pulmonary:      Effort: Pulmonary effort is normal.      Breath sounds: Normal breath sounds. No wheezing. No rhonchi. No rales.   Cardiovascular:      Normal rate. Regular rhythm.      Murmurs: There is a grade 3/6 harsh midsystolic murmur at the URSB, radiating to the neck.      No gallop. No rub.   Pulses:     Intact distal pulses.   Edema:     Peripheral edema absent.   Abdominal:      General: There is no distension.      Palpations: Abdomen is soft. There is no abdominal mass.   Skin:     General: Skin is warm and dry.      Findings: No rash.   Neurological:      General: No focal deficit present.      Mental Status: Alert and " oriented to person, place, and time.      Gait: Gait is intact.   Psychiatric:         Attention and Perception: Attention normal.         Mood and Affect: Mood normal.         Behavior: Behavior normal.           ECG 12 Lead    Date/Time: 10/19/2020 10:45 AM  Performed by: Claudia Gallegos APRN  Authorized by: Claudia Gallegos APRN   Comparison: not compared with previous ECG   Rhythm: sinus rhythm and sinus arrhythmia  BPM: 70    Clinical impression: abnormal EKG  Comments: Sinus rhythm with sinus arrhythmia  QT/QTc 390/421                    Assessment:   Assessment/Plan      Problems Addressed this Visit        Cardiovascular and Mediastinum    Essential hypertension    Mixed hyperlipidemia    Nonrheumatic aortic valve stenosis - Primary    Relevant Orders    ECG 12 Lead      Diagnoses       Codes Comments    Nonrheumatic aortic valve stenosis    -  Primary ICD-10-CM: I35.0  ICD-9-CM: 424.1     Essential hypertension     ICD-10-CM: I10  ICD-9-CM: 401.9     Mixed hyperlipidemia     ICD-10-CM: E78.2  ICD-9-CM: 272.2         Patient has asymptomatic the severe aortic stenosis.  Discussed symptoms to watch for to contact us.  At this time, given her asymptomatic nature, she does not wish to see a surgeon at this time.  Patient does understand she will contact us immediately she develop any exertional dyspnea shortness of breath or fatigue or dizziness.  We will plan on a outpatient cardiac cath after seeing CT surgery  Would recommend changing Zocor to Lipitor 80 mg and repeat lipid panel and CMP in 6 to 8 weeks through primary physician       LIBBY Camarena scribe for Dr. Boris Redd  I have seen and examined the patient, I have reviewed the note, discussed the case with the advance practice clinician, made necessary changes and I agree with the final note.    Jaylan Redd MD  10/19/20  11:58 EDT        Dictated utilizing Dragon dictation

## 2020-10-19 ENCOUNTER — CONSULT (OUTPATIENT)
Dept: CARDIOLOGY | Facility: CLINIC | Age: 65
End: 2020-10-19

## 2020-10-19 VITALS
HEIGHT: 59 IN | BODY MASS INDEX: 25.4 KG/M2 | DIASTOLIC BLOOD PRESSURE: 60 MMHG | SYSTOLIC BLOOD PRESSURE: 118 MMHG | HEART RATE: 70 BPM | WEIGHT: 126 LBS

## 2020-10-19 DIAGNOSIS — I35.0 NONRHEUMATIC AORTIC VALVE STENOSIS: Primary | ICD-10-CM

## 2020-10-19 DIAGNOSIS — E78.2 MIXED HYPERLIPIDEMIA: ICD-10-CM

## 2020-10-19 DIAGNOSIS — I10 ESSENTIAL HYPERTENSION: ICD-10-CM

## 2020-10-19 PROCEDURE — 99204 OFFICE O/P NEW MOD 45 MIN: CPT | Performed by: INTERNAL MEDICINE

## 2020-10-19 PROCEDURE — 93000 ELECTROCARDIOGRAM COMPLETE: CPT | Performed by: NURSE PRACTITIONER

## 2020-11-18 ENCOUNTER — APPOINTMENT (OUTPATIENT)
Dept: OTHER | Facility: HOSPITAL | Age: 65
End: 2020-11-18

## 2020-11-18 ENCOUNTER — HOSPITAL ENCOUNTER (OUTPATIENT)
Dept: MAMMOGRAPHY | Facility: HOSPITAL | Age: 65
Discharge: HOME | End: 2020-11-18
Admitting: NURSE PRACTITIONER

## 2020-11-18 DIAGNOSIS — Z12.39 SCREENING FOR BREAST CANCER: ICD-10-CM

## 2020-11-18 PROCEDURE — 77067 SCR MAMMO BI INCL CAD: CPT

## 2020-11-18 PROCEDURE — 77067 SCR MAMMO BI INCL CAD: CPT | Performed by: RADIOLOGY

## 2020-11-18 PROCEDURE — 77063 BREAST TOMOSYNTHESIS BI: CPT | Performed by: RADIOLOGY

## 2020-11-18 PROCEDURE — 77063 BREAST TOMOSYNTHESIS BI: CPT

## 2021-01-09 DIAGNOSIS — E78.2 MIXED HYPERLIPIDEMIA: ICD-10-CM

## 2021-01-09 DIAGNOSIS — I10 ESSENTIAL HYPERTENSION: ICD-10-CM

## 2021-01-12 RX ORDER — NIFEDIPINE 60 MG/1
TABLET, FILM COATED, EXTENDED RELEASE ORAL
Qty: 90 TABLET | Refills: 0 | Status: SHIPPED | OUTPATIENT
Start: 2021-01-12 | End: 2021-03-08 | Stop reason: SDUPTHER

## 2021-03-08 ENCOUNTER — OFFICE VISIT (OUTPATIENT)
Dept: INTERNAL MEDICINE | Facility: CLINIC | Age: 66
End: 2021-03-08

## 2021-03-08 VITALS
TEMPERATURE: 97.7 F | WEIGHT: 122.2 LBS | HEIGHT: 59 IN | BODY MASS INDEX: 24.64 KG/M2 | RESPIRATION RATE: 20 BRPM | DIASTOLIC BLOOD PRESSURE: 78 MMHG | OXYGEN SATURATION: 96 % | SYSTOLIC BLOOD PRESSURE: 132 MMHG | HEART RATE: 76 BPM

## 2021-03-08 DIAGNOSIS — I35.0 SEVERE AORTIC STENOSIS: ICD-10-CM

## 2021-03-08 DIAGNOSIS — I10 ESSENTIAL HYPERTENSION: Primary | ICD-10-CM

## 2021-03-08 DIAGNOSIS — E78.2 MIXED HYPERLIPIDEMIA: ICD-10-CM

## 2021-03-08 DIAGNOSIS — E55.9 VITAMIN D DEFICIENCY: ICD-10-CM

## 2021-03-08 PROCEDURE — 99214 OFFICE O/P EST MOD 30 MIN: CPT | Performed by: NURSE PRACTITIONER

## 2021-03-08 RX ORDER — BENAZEPRIL HYDROCHLORIDE 40 MG/1
40 TABLET, FILM COATED ORAL DAILY
Qty: 90 TABLET | Refills: 1 | Status: SHIPPED | OUTPATIENT
Start: 2021-03-08 | End: 2021-03-18 | Stop reason: SDUPTHER

## 2021-03-08 RX ORDER — NIFEDIPINE 60 MG/1
60 TABLET, FILM COATED, EXTENDED RELEASE ORAL DAILY
Qty: 90 TABLET | Refills: 1 | Status: SHIPPED | OUTPATIENT
Start: 2021-03-08 | End: 2021-04-26 | Stop reason: SDUPTHER

## 2021-03-08 RX ORDER — SIMVASTATIN 40 MG
40 TABLET ORAL NIGHTLY
Qty: 90 TABLET | Refills: 1 | Status: SHIPPED | OUTPATIENT
Start: 2021-03-08 | End: 2021-03-18 | Stop reason: SDUPTHER

## 2021-03-08 NOTE — PROGRESS NOTES
Subjective   Greta Marino is a 65 y.o. female    Chief Complaint   Patient presents with   • Hypertension     6m f/u    • Hyperlipidemia     6m f/u    • Referral for 2nd opinion - cardiology     History of Present Illness     Here for f/u     HTN - has been consistently taking BP meds (nifedipine and benazepril) as directed since last visit.  BP looks great today.  Denies any medication SE's     HL - chronic; currently taking Simvastatin 40 mg daily.  Denies med SE's.  Cardiology recommended changing to Lipitor, but this was not done    Was referred to Cardiology after echo showing severe aortic stenosis.  She was seen by Cardiology and a consult with CT surgery was recommended.  She will have 6 month f/u with Cards and repeat echo this month and agrees to see recommended CT surgeon following that marquita and Ivania's kristi    Tdap - 2015  Flu shot -8/2020  COVID 19 - provided pt with info  Mamm - 11/2020  Pap - due  Colon- 8/2020, due in 2023      The following portions of the patient's history were reviewed and updated as appropriate: allergies, current medications, past family history, past medical history, past social history, past surgical history and problem list.    Current Outpatient Medications:   •  benazepril (LOTENSIN) 40 MG tablet, Take 1 tablet by mouth Daily., Disp: 90 tablet, Rfl: 1  •  NIFEdipine CC (ADALAT CC) 60 MG 24 hr tablet, Take 1 tablet by mouth Daily., Disp: 90 tablet, Rfl: 1  •  simvastatin (Zocor) 40 MG tablet, Take 1 tablet by mouth Every Night., Disp: 90 tablet, Rfl: 1     Review of Systems   Constitutional: Negative for chills, fatigue and fever.   Respiratory: Negative for cough, chest tightness and shortness of breath.    Cardiovascular: Negative for chest pain.   Gastrointestinal: Negative for abdominal pain, diarrhea, nausea and vomiting.   Endocrine: Negative for cold intolerance and heat intolerance.   Musculoskeletal: Negative for arthralgias.   Neurological: Negative for  "dizziness.       Objective   Physical Exam  Constitutional:       Appearance: She is well-developed.   HENT:      Head: Normocephalic and atraumatic.   Eyes:      Conjunctiva/sclera: Conjunctivae normal.      Pupils: Pupils are equal, round, and reactive to light.   Cardiovascular:      Rate and Rhythm: Normal rate and regular rhythm.      Heart sounds: Murmur present. Systolic murmur present with a grade of 4/6.   Pulmonary:      Effort: Pulmonary effort is normal.      Breath sounds: Normal breath sounds.   Abdominal:      General: Bowel sounds are normal.      Palpations: Abdomen is soft.   Musculoskeletal:         General: Normal range of motion.      Cervical back: Normal range of motion.   Skin:     General: Skin is warm and dry.   Neurological:      Mental Status: She is alert and oriented to person, place, and time.      Deep Tendon Reflexes: Reflexes are normal and symmetric.   Psychiatric:         Behavior: Behavior normal.         Thought Content: Thought content normal.         Judgment: Judgment normal.       Vitals:    03/08/21 1628   BP: 132/78   Pulse: 76   Resp: 20   Temp: 97.7 °F (36.5 °C)   TempSrc: Temporal   SpO2: 96%   Weight: 55.4 kg (122 lb 3.2 oz)   Height: 149.9 cm (59\")         Assessment/Plan   Diagnoses and all orders for this visit:    1. Essential hypertension (Primary)  -     benazepril (LOTENSIN) 40 MG tablet; Take 1 tablet by mouth Daily.  Dispense: 90 tablet; Refill: 1  -     NIFEdipine CC (ADALAT CC) 60 MG 24 hr tablet; Take 1 tablet by mouth Daily.  Dispense: 90 tablet; Refill: 1  -     CBC & Differential; Future  -     Comprehensive Metabolic Panel; Future  -     Lipid Panel; Future  -     TSH; Future  -     Vitamin B12; Future  -     Vitamin D 25 Hydroxy; Future    2. Mixed hyperlipidemia  -     benazepril (LOTENSIN) 40 MG tablet; Take 1 tablet by mouth Daily.  Dispense: 90 tablet; Refill: 1  -     NIFEdipine CC (ADALAT CC) 60 MG 24 hr tablet; Take 1 tablet by mouth Daily.  " Dispense: 90 tablet; Refill: 1  -     simvastatin (Zocor) 40 MG tablet; Take 1 tablet by mouth Every Night.  Dispense: 90 tablet; Refill: 1  -     CBC & Differential; Future  -     Comprehensive Metabolic Panel; Future  -     Lipid Panel; Future  -     TSH; Future  -     Vitamin B12; Future  -     Vitamin D 25 Hydroxy; Future    3. Severe aortic stenosis  -     CBC & Differential; Future  -     Comprehensive Metabolic Panel; Future  -     Lipid Panel; Future  -     TSH; Future  -     Vitamin B12; Future  -     Vitamin D 25 Hydroxy; Future    4. Vitamin D deficiency  -     CBC & Differential; Future  -     Comprehensive Metabolic Panel; Future  -     Lipid Panel; Future  -     TSH; Future  -     Vitamin B12; Future  -     Vitamin D 25 Hydroxy; Future    Pt is not fasting, so she will RTC for labs  Meds refilled  I have encouraged her to f/u with Cardiology as directed, as I strongly agree and support Ivania's recs, she VU and agrees  Provided with info on COVID vaccine  Return in about 6 months (around 9/8/2021) for Annual.               Answers for HPI/ROS submitted by the patient on 3/8/2021  What is the primary reason for your visit?: High Blood Pressure

## 2021-03-18 ENCOUNTER — TELEPHONE (OUTPATIENT)
Dept: INTERNAL MEDICINE | Facility: CLINIC | Age: 66
End: 2021-03-18

## 2021-03-18 DIAGNOSIS — I10 ESSENTIAL HYPERTENSION: ICD-10-CM

## 2021-03-18 DIAGNOSIS — E78.2 MIXED HYPERLIPIDEMIA: ICD-10-CM

## 2021-03-18 RX ORDER — SIMVASTATIN 40 MG
40 TABLET ORAL NIGHTLY
Qty: 90 TABLET | Refills: 1 | Status: SHIPPED | OUTPATIENT
Start: 2021-03-18 | End: 2022-03-29

## 2021-03-18 RX ORDER — BENAZEPRIL HYDROCHLORIDE 40 MG/1
40 TABLET, FILM COATED ORAL DAILY
Qty: 90 TABLET | Refills: 1 | Status: SHIPPED | OUTPATIENT
Start: 2021-03-18 | End: 2021-09-10

## 2021-03-18 NOTE — TELEPHONE ENCOUNTER
Called and spoke to patient, informed her that medications were sent to pharmacy. She verbalized understanding and had no further questions.

## 2021-03-18 NOTE — TELEPHONE ENCOUNTER
Pt came in for labs could not get due to  not here she states she went to  meds from [facility] [address] and 2 was not there. She wants to know if Alia Pinedantyre is waiting for labs to come back I did check and the pharmacy confirmed getting rx the medication is will be needing is simvastatin and benazepril

## 2021-03-25 ENCOUNTER — TELEPHONE (OUTPATIENT)
Dept: INTERNAL MEDICINE | Facility: CLINIC | Age: 66
End: 2021-03-25

## 2021-03-25 LAB
25(OH)D3+25(OH)D2 SERPL-MCNC: 14.9 NG/ML (ref 30–100)
ALBUMIN SERPL-MCNC: 4.3 G/DL (ref 3.5–5.2)
ALBUMIN/GLOB SERPL: 1.9 G/DL
ALP SERPL-CCNC: 74 U/L (ref 39–117)
ALT SERPL-CCNC: 13 U/L (ref 1–33)
AST SERPL-CCNC: 16 U/L (ref 1–32)
BASOPHILS # BLD AUTO: 0.04 10*3/MM3 (ref 0–0.2)
BASOPHILS NFR BLD AUTO: 1.1 % (ref 0–1.5)
BILIRUB SERPL-MCNC: 0.5 MG/DL (ref 0–1.2)
BUN SERPL-MCNC: 17 MG/DL (ref 8–23)
BUN/CREAT SERPL: 25 (ref 7–25)
CALCIUM SERPL-MCNC: 9.2 MG/DL (ref 8.6–10.5)
CHLORIDE SERPL-SCNC: 105 MMOL/L (ref 98–107)
CHOLEST SERPL-MCNC: 123 MG/DL (ref 0–200)
CO2 SERPL-SCNC: 26.1 MMOL/L (ref 22–29)
CREAT SERPL-MCNC: 0.68 MG/DL (ref 0.57–1)
EOSINOPHIL # BLD AUTO: 0.09 10*3/MM3 (ref 0–0.4)
EOSINOPHIL NFR BLD AUTO: 2.4 % (ref 0.3–6.2)
ERYTHROCYTE [DISTWIDTH] IN BLOOD BY AUTOMATED COUNT: 12.9 % (ref 12.3–15.4)
GLOBULIN SER CALC-MCNC: 2.3 GM/DL
GLUCOSE SERPL-MCNC: 90 MG/DL (ref 65–99)
HCT VFR BLD AUTO: 40.3 % (ref 34–46.6)
HDLC SERPL-MCNC: 32 MG/DL (ref 40–60)
HGB BLD-MCNC: 13.3 G/DL (ref 12–15.9)
IMM GRANULOCYTES # BLD AUTO: 0 10*3/MM3 (ref 0–0.05)
IMM GRANULOCYTES NFR BLD AUTO: 0 % (ref 0–0.5)
LDLC SERPL CALC-MCNC: 71 MG/DL (ref 0–100)
LYMPHOCYTES # BLD AUTO: 1.25 10*3/MM3 (ref 0.7–3.1)
LYMPHOCYTES NFR BLD AUTO: 33.6 % (ref 19.6–45.3)
MCH RBC QN AUTO: 32.3 PG (ref 26.6–33)
MCHC RBC AUTO-ENTMCNC: 33 G/DL (ref 31.5–35.7)
MCV RBC AUTO: 97.8 FL (ref 79–97)
MONOCYTES # BLD AUTO: 0.23 10*3/MM3 (ref 0.1–0.9)
MONOCYTES NFR BLD AUTO: 6.2 % (ref 5–12)
NEUTROPHILS # BLD AUTO: 2.11 10*3/MM3 (ref 1.7–7)
NEUTROPHILS NFR BLD AUTO: 56.7 % (ref 42.7–76)
NRBC BLD AUTO-RTO: 0 /100 WBC (ref 0–0.2)
PLATELET # BLD AUTO: 182 10*3/MM3 (ref 140–450)
POTASSIUM SERPL-SCNC: 4.2 MMOL/L (ref 3.5–5.2)
PROT SERPL-MCNC: 6.6 G/DL (ref 6–8.5)
RBC # BLD AUTO: 4.12 10*6/MM3 (ref 3.77–5.28)
SODIUM SERPL-SCNC: 140 MMOL/L (ref 136–145)
TRIGL SERPL-MCNC: 108 MG/DL (ref 0–150)
TSH SERPL DL<=0.005 MIU/L-ACNC: 0.99 UIU/ML (ref 0.27–4.2)
VIT B12 SERPL-MCNC: 311 PG/ML (ref 211–946)
VLDLC SERPL CALC-MCNC: 20 MG/DL (ref 5–40)
WBC # BLD AUTO: 3.72 10*3/MM3 (ref 3.4–10.8)

## 2021-03-25 RX ORDER — CHOLECALCIFEROL (VITAMIN D3) 1250 MCG
50000 CAPSULE ORAL
Qty: 8 CAPSULE | Refills: 0 | Status: SHIPPED | OUTPATIENT
Start: 2021-03-25 | End: 2021-05-14

## 2021-03-25 NOTE — TELEPHONE ENCOUNTER
----- Message from LIBBY López sent at 3/25/2021  3:42 PM EDT -----  Vit D is very low.  I am going to send in a Rx strength D3 that she will take once a week x 8 weeks, then she will need to  OTC D3 and take 4000 units daily.      B12 is also low.  I recommend OTC B12, 1000 mcg daily.    All other labs looked great and are within acceptable limits.

## 2021-04-25 NOTE — PROGRESS NOTES
[facility] Cardiology  Subjective:     Encounter Date: 04/26/2021      Patient ID: Greta Marino is a 66 y.o. female.    Chief Complaint: Hypertension and Hyperlipidemia      PROBLEM LIST:  1. Aortic stenosis   a. Echocardiogram, 5/11/2016: EF 70%. LV has mild concentric hypertrophy. AV was trileaflet. Moderate stenosis mean 20-25 mmHg, max 45-50 mmHg. Moderate AR, MR, and TR. LA mildly dilated.   b. Echocardiogram, 8/17/2020: LVEF 66%. Moderate AI and severe AS with mean gradient 42 mmHg.  Mild MR and TR.  RVSP 23  mmHg. Grade 1 diastolic dysfunction\  c. Echocardiogram, 4/26/2021: EF 60%. Severe aortic valve stenosis is present. Aortic valve area is 0.8 cm2. AV max pressure gradient 76 mmHg, mean 39 mmHg. Mild to moderate AR. Mild MR.  2. Hypertension  3. Hyperlipidemia.       History of Present Illness  Greta Marino returns today for a 6 month follow up with a history of aortic valve stenosis and cardiac risk factors. Since last visit, she has been doing well overall from a cardiovascular standpoint. She has a twinge around her upper left side of her chest, but thinks it may be related to her shoulder. She walks for exercise and works 25-30 hours a week. She walks up stairs on a daily basis without any issues. Patient denies chest pain, shortness of breath, palpitations, edema, dizziness, and syncope. Patient has had no interim ER visits, hospitalizations, serious illnesses, or injuries.            Allergies   Allergen Reactions   • Peanut-Containing Drug Products Hives         Current Outpatient Medications:   •  benazepril (LOTENSIN) 40 MG tablet, Take 1 tablet by mouth Daily., Disp: 90 tablet, Rfl: 1  •  Cholecalciferol (Vitamin D3) 1.25 MG (89240 UT) capsule, Take 1 capsule by mouth Every 7 (Seven) Days for 8 doses., Disp: 8 capsule, Rfl: 0  •  NIFEdipine CC (ADALAT CC) 60 MG 24 hr tablet, Take 1 tablet by mouth Daily., Disp: 90 tablet, Rfl: 1  •  simvastatin (Zocor) 40 MG  "tablet, Take 1 tablet by mouth Every Night., Disp: 90 tablet, Rfl: 1    The following portions of the patient's history were reviewed and updated as appropriate: allergies, current medications, past family history, past medical history, past social history, past surgical history and problem list.    Review of Systems   Constitutional: Negative.   Cardiovascular: Negative for chest pain, dyspnea on exertion, leg swelling, palpitations and syncope.   Respiratory: Negative.  Negative for shortness of breath.    Hematologic/Lymphatic: Negative for bleeding problem. Does not bruise/bleed easily.   Skin: Negative for rash.   Musculoskeletal: Negative for muscle weakness and myalgias.   Gastrointestinal: Negative for heartburn, nausea and vomiting.   Neurological: Negative for dizziness, light-headedness, loss of balance and numbness.          Objective:     Vitals:    04/26/21 1502   BP: 134/74   BP Location: Left arm   Patient Position: Sitting   Pulse: 63   SpO2: 98%   Weight: 55.8 kg (123 lb)   Height: 149.9 cm (59\")         Constitutional:       Appearance: Well-developed.   Neck:      Thyroid: No thyromegaly.      Vascular: No carotid bruit or JVD.   Pulmonary:      Breath sounds: Normal breath sounds.   Cardiovascular:      Regular rhythm.      Murmurs: There is a grade 4/6 harsh midsystolic murmur at the URSB, radiating to the neck.      No gallop. No S3 and S4 gallop.   Edema:     Peripheral edema absent.   Abdominal:      General: Bowel sounds are normal.      Palpations: Abdomen is soft. There is no abdominal mass.      Tenderness: There is no abdominal tenderness.   Skin:     General: Skin is warm and dry.      Findings: No rash.   Neurological:      Mental Status: Alert and oriented to person, place, and time.         Lab Review:  Lab Results   Component Value Date    GLU 90 03/24/2021    BUN 17 03/24/2021    CREATININE 0.68 03/24/2021    EGFRIFNONA 87 03/24/2021    EGFRIFAFRI 106 03/24/2021    BCR 25.0 " 03/24/2021    K 4.2 03/24/2021    CO2 26.1 03/24/2021    CALCIUM 9.2 03/24/2021    ALBUMIN 4.30 03/24/2021    ALKPHOS 74 03/24/2021    AST 16 03/24/2021    ALT 13 03/24/2021     Lab Results   Component Value Date    CHLPL 123 03/24/2021    TRIG 108 03/24/2021    HDL 32 (L) 03/24/2021    LDL 71 03/24/2021      Lab Results   Component Value Date    WBC 3.72 03/24/2021    RBC 4.12 03/24/2021    HGB 13.3 03/24/2021    HCT 40.3 03/24/2021    MCV 97.8 (H) 03/24/2021     03/24/2021     Lab Results   Component Value Date    TSH 0.988 03/24/2021     Lab Results   Component Value Date    HGBA1C 5.00 07/30/2020        Procedures               Assessment:   Diagnoses and all orders for this visit:    1. Nonrheumatic aortic valve stenosis (Primary)    2. Essential hypertension    3. Mixed hyperlipidemia        Impression:  1. Severe aortic stenosis.  Asymptomatic. Echocardiogram from today shows EF 60% with severe AV stenosis with max pressure gradient of 76 mmHg.   2. Hypertension. Well controlled on Benazepril and nifedipine.  3. Hyperlipidemia. Well controlled on simvastatin.    Plan:  1. Patient remains asymptomatic despite severe aortic stenosis.  He is very active at work, walking 10-15,000 steps daily.  2. Discussed symptoms, particularly exertional, with weeks to seek medical care immediately.  3. We will continue to follow aortic stenosis until symptoms occur.  4. We will schedule consultation to discuss valve types with CT surgery prior to this.  5. Continue current medications.  6. Revisit in 6 MO, or sooner as needed.      Scribed for Jaylan Redd MD by Zahra Siu. 4/26/2021 16:09 EDT            Jaylan Redd MD      Please note that portions of this note may have been completed with a voice recognition program. Efforts were made to edit the dictations, but occasionally words are mistranscribed.

## 2021-04-26 ENCOUNTER — OFFICE VISIT (OUTPATIENT)
Dept: CARDIOLOGY | Facility: CLINIC | Age: 66
End: 2021-04-26

## 2021-04-26 ENCOUNTER — HOSPITAL ENCOUNTER (OUTPATIENT)
Dept: CARDIOLOGY | Facility: HOSPITAL | Age: 66
Discharge: HOME | End: 2021-04-26
Admitting: INTERNAL MEDICINE

## 2021-04-26 VITALS
DIASTOLIC BLOOD PRESSURE: 74 MMHG | OXYGEN SATURATION: 98 % | HEIGHT: 59 IN | SYSTOLIC BLOOD PRESSURE: 134 MMHG | BODY MASS INDEX: 24.8 KG/M2 | WEIGHT: 123 LBS | HEART RATE: 63 BPM

## 2021-04-26 VITALS — HEIGHT: 59 IN | BODY MASS INDEX: 24.6 KG/M2 | WEIGHT: 122 LBS

## 2021-04-26 DIAGNOSIS — E78.2 MIXED HYPERLIPIDEMIA: ICD-10-CM

## 2021-04-26 DIAGNOSIS — I35.0 NONRHEUMATIC AORTIC VALVE STENOSIS: ICD-10-CM

## 2021-04-26 DIAGNOSIS — I35.0 NONRHEUMATIC AORTIC VALVE STENOSIS: Primary | ICD-10-CM

## 2021-04-26 DIAGNOSIS — I10 ESSENTIAL HYPERTENSION: ICD-10-CM

## 2021-04-26 DIAGNOSIS — R01.1 MURMUR, CARDIAC: ICD-10-CM

## 2021-04-26 LAB
BH CV ECHO MEAS - AI DEC SLOPE: 212.4 CM/SEC^2
BH CV ECHO MEAS - AI MAX PG: 54 MMHG
BH CV ECHO MEAS - AI MAX VEL: 366.9 CM/SEC
BH CV ECHO MEAS - AI P1/2T: 505.9 MSEC
BH CV ECHO MEAS - AO MAX PG (FULL): 68.5 MMHG
BH CV ECHO MEAS - AO MAX PG: 76 MMHG
BH CV ECHO MEAS - AO MEAN PG (FULL): 33.2 MMHG
BH CV ECHO MEAS - AO MEAN PG: 39 MMHG
BH CV ECHO MEAS - AO ROOT AREA (BSA CORRECTED): 2.2
BH CV ECHO MEAS - AO ROOT AREA: 8.4 CM^2
BH CV ECHO MEAS - AO ROOT DIAM: 3.3 CM
BH CV ECHO MEAS - AO V2 MAX: 436 CM/SEC
BH CV ECHO MEAS - AO V2 MEAN: 266.9 CM/SEC
BH CV ECHO MEAS - AO V2 VTI: 110.2 CM
BH CV ECHO MEAS - ASC AORTA: 3.3 CM
BH CV ECHO MEAS - AVA(I,A): 0.68 CM^2
BH CV ECHO MEAS - AVA(I,D): 0.8 CM^2
BH CV ECHO MEAS - AVA(V,A): 0.68 CM^2
BH CV ECHO MEAS - AVA(V,D): 0.68 CM^2
BH CV ECHO MEAS - BSA(HAYCOCK): 1.5 M^2
BH CV ECHO MEAS - BSA: 1.5 M^2
BH CV ECHO MEAS - BZI_BMI: 24.6 KILOGRAMS/M^2
BH CV ECHO MEAS - BZI_METRIC_HEIGHT: 149.9 CM
BH CV ECHO MEAS - BZI_METRIC_WEIGHT: 55.3 KG
BH CV ECHO MEAS - EDV(CUBED): 64.8 ML
BH CV ECHO MEAS - EDV(MOD-SP2): 102 ML
BH CV ECHO MEAS - EDV(MOD-SP4): 116 ML
BH CV ECHO MEAS - EDV(TEICH): 70.7 ML
BH CV ECHO MEAS - EF(CUBED): 69.8 %
BH CV ECHO MEAS - EF(MOD-BP): 60 %
BH CV ECHO MEAS - EF(MOD-SP2): 63.7 %
BH CV ECHO MEAS - EF(MOD-SP4): 51.7 %
BH CV ECHO MEAS - EF(TEICH): 62 %
BH CV ECHO MEAS - ESV(CUBED): 19.6 ML
BH CV ECHO MEAS - ESV(MOD-SP2): 37 ML
BH CV ECHO MEAS - ESV(MOD-SP4): 56 ML
BH CV ECHO MEAS - ESV(TEICH): 26.9 ML
BH CV ECHO MEAS - FS: 32.9 %
BH CV ECHO MEAS - IVS/LVPW: 0.76
BH CV ECHO MEAS - IVSD: 0.91 CM
BH CV ECHO MEAS - LA DIMENSION: 2.7 CM
BH CV ECHO MEAS - LA/AO: 0.83
BH CV ECHO MEAS - LAD MAJOR: 3.8 CM
BH CV ECHO MEAS - LAT PEAK E' VEL: 9.8 CM/SEC
BH CV ECHO MEAS - LATERAL E/E' RATIO: 7.1
BH CV ECHO MEAS - LV DIASTOLIC VOL/BSA (35-75): 77.6 ML/M^2
BH CV ECHO MEAS - LV MASS(C)D: 137.6 GRAMS
BH CV ECHO MEAS - LV MASS(C)DI: 92.1 GRAMS/M^2
BH CV ECHO MEAS - LV MAX PG: 5.7 MMHG
BH CV ECHO MEAS - LV MEAN PG: 2.7 MMHG
BH CV ECHO MEAS - LV SYSTOLIC VOL/BSA (12-30): 37.5 ML/M^2
BH CV ECHO MEAS - LV V1 MAX: 119.4 CM/SEC
BH CV ECHO MEAS - LV V1 MEAN: 75.7 CM/SEC
BH CV ECHO MEAS - LV V1 VTI: 30.4 CM
BH CV ECHO MEAS - LVIDD: 4 CM
BH CV ECHO MEAS - LVIDS: 2.7 CM
BH CV ECHO MEAS - LVLD AP2: 7.4 CM
BH CV ECHO MEAS - LVLD AP4: 8.2 CM
BH CV ECHO MEAS - LVLS AP2: 6.9 CM
BH CV ECHO MEAS - LVLS AP4: 7 CM
BH CV ECHO MEAS - LVOT AREA (M): 2.5 CM^2
BH CV ECHO MEAS - LVOT AREA: 2.5 CM^2
BH CV ECHO MEAS - LVOT DIAM: 1.8 CM
BH CV ECHO MEAS - LVPWD: 1.2 CM
BH CV ECHO MEAS - MED PEAK E' VEL: 7.5 CM/SEC
BH CV ECHO MEAS - MEDIAL E/E' RATIO: 9.2
BH CV ECHO MEAS - MV A MAX VEL: 87.9 CM/SEC
BH CV ECHO MEAS - MV DEC SLOPE: 334.2 CM/SEC^2
BH CV ECHO MEAS - MV DEC TIME: 0.22 SEC
BH CV ECHO MEAS - MV E MAX VEL: 70.1 CM/SEC
BH CV ECHO MEAS - MV E/A: 0.8
BH CV ECHO MEAS - MV MAX PG: 5.6 MMHG
BH CV ECHO MEAS - MV MEAN PG: 2.7 MMHG
BH CV ECHO MEAS - MV P1/2T MAX VEL: 103.7 CM/SEC
BH CV ECHO MEAS - MV P1/2T: 90.9 MSEC
BH CV ECHO MEAS - MV V2 MAX: 118.5 CM/SEC
BH CV ECHO MEAS - MV V2 MEAN: 77.4 CM/SEC
BH CV ECHO MEAS - MV V2 VTI: 39 CM
BH CV ECHO MEAS - MVA P1/2T LCG: 2.1 CM^2
BH CV ECHO MEAS - MVA(P1/2T): 2.4 CM^2
BH CV ECHO MEAS - MVA(VTI): 1.9 CM^2
BH CV ECHO MEAS - PA ACC SLOPE: 600.3 CM/SEC^2
BH CV ECHO MEAS - PA ACC TIME: 0.16 SEC
BH CV ECHO MEAS - PA PR(ACCEL): 6.1 MMHG
BH CV ECHO MEAS - SI(AO): 615.5 ML/M^2
BH CV ECHO MEAS - SI(CUBED): 30.3 ML/M^2
BH CV ECHO MEAS - SI(LVOT): 49.8 ML/M^2
BH CV ECHO MEAS - SI(MOD-SP2): 43.5 ML/M^2
BH CV ECHO MEAS - SI(MOD-SP4): 40.1 ML/M^2
BH CV ECHO MEAS - SI(TEICH): 29.3 ML/M^2
BH CV ECHO MEAS - SV(AO): 920 ML
BH CV ECHO MEAS - SV(CUBED): 45.2 ML
BH CV ECHO MEAS - SV(LVOT): 74.4 ML
BH CV ECHO MEAS - SV(MOD-SP2): 65 ML
BH CV ECHO MEAS - SV(MOD-SP4): 60 ML
BH CV ECHO MEAS - SV(TEICH): 43.8 ML
BH CV ECHO MEAS - TAPSE (>1.6): 1.8 CM
BH CV ECHO MEASUREMENTS AVERAGE E/E' RATIO: 8.1
BH CV VAS BP LEFT ARM: NORMAL MMHG
BH CV XLRA - RV BASE: 3.7 CM
BH CV XLRA - RV LENGTH: 6.2 CM
BH CV XLRA - RV MID: 2.7 CM
BH CV XLRA - TDI S': 17.1 CM/SEC
LEFT ATRIUM VOLUME INDEX: 18.1 ML/M^2
LEFT ATRIUM VOLUME: 27 ML
LV EF 2D ECHO EST: 60 %
MAXIMAL PREDICTED HEART RATE: 155 BPM
STRESS TARGET HR: 132 BPM

## 2021-04-26 PROCEDURE — 93306 TTE W/DOPPLER COMPLETE: CPT

## 2021-04-26 PROCEDURE — 93306 TTE W/DOPPLER COMPLETE: CPT | Performed by: INTERNAL MEDICINE

## 2021-04-26 PROCEDURE — 99213 OFFICE O/P EST LOW 20 MIN: CPT | Performed by: INTERNAL MEDICINE

## 2021-04-26 RX ORDER — NIFEDIPINE 60 MG/1
60 TABLET, FILM COATED, EXTENDED RELEASE ORAL DAILY
Qty: 90 TABLET | Refills: 3 | Status: SHIPPED | OUTPATIENT
Start: 2021-04-26 | End: 2022-04-04 | Stop reason: SDUPTHER

## 2021-05-24 ENCOUNTER — OFFICE VISIT (OUTPATIENT)
Dept: CARDIAC SURGERY | Facility: CLINIC | Age: 66
End: 2021-05-24

## 2021-05-24 VITALS
BODY MASS INDEX: 26.03 KG/M2 | TEMPERATURE: 98.4 F | HEIGHT: 58 IN | DIASTOLIC BLOOD PRESSURE: 79 MMHG | OXYGEN SATURATION: 99 % | WEIGHT: 124 LBS | SYSTOLIC BLOOD PRESSURE: 122 MMHG | HEART RATE: 74 BPM

## 2021-05-24 DIAGNOSIS — I35.9 AORTIC VALVE DISORDER: Primary | ICD-10-CM

## 2021-05-24 PROCEDURE — 99204 OFFICE O/P NEW MOD 45 MIN: CPT | Performed by: THORACIC SURGERY (CARDIOTHORACIC VASCULAR SURGERY)

## 2021-05-24 RX ORDER — IBUPROFEN 800 MG
4000 TABLET ORAL DAILY
COMMUNITY

## 2021-05-24 RX ORDER — LANOLIN ALCOHOL/MO/W.PET/CERES
1000 CREAM (GRAM) TOPICAL DAILY
COMMUNITY

## 2021-05-24 NOTE — PROGRESS NOTES
05/24/2021  Patient Information  Greta Marino                                                                                          [address] [facility] [address]   1955  'PCP/Referring Physician'  Alia Vasquez, APRN  [phone]  Jaylan Redd MD  [phone]  Chief Complaint   Patient presents with   • Consult     Np referred for aortic stenosis,complains of shortness of breath with activity.   • Aortic Stenosis       History of Present Illness:   The patient is a 66-year-old  female who is being referred for aortic valve stenosis. She is a patient of Dr. Redd.  Her echo was done in August, 2020 and she had a repeat here at [facility], recently.  This reveals severe aortic stenosis by multiple parameters.  She has a mean gradient between 39 and 42 cm, and a V-max greater than 400 cm a second, an aortic valve area plaque proximally 0.8 cm².  She denies a history of rheumatic fever.  She denies a familial history of aortic valvular issues.  She denies Marfan's or Elisabet-Danlos syndrome or other problems related to that.      Patient Active Problem List   Diagnosis   • Essential hypertension   • Mixed hyperlipidemia   • Arthritis   • Nonrheumatic aortic valve stenosis   • Aortic valve disorder     Past Medical History:   Diagnosis Date   • Arthritis    • Asthma    • Coronary artery disease    • Heart murmur    • Heart valve disease    • History of mammography, screening 2017    Done in [place]    • History of Papanicolaou smear of cervix 2017    Done in [place]   • History of shingles 2015   • Hyperlipidemia    • Hypertension      Past Surgical History:   Procedure Laterality Date   • ANKLE SURGERY Left 1980's   • BREAST BIOPSY Right 1995   • COLONOSCOPY  8/24/2020, 2005    8/24/2020- Dr. Bowling. 2005-Dr. Spence in [place], [place]   • KNEE SURGERY Right 12/20/2018    meniscus repair       Current Outpatient Medications:   •  benazepril (LOTENSIN) 40 MG  tablet, Take 1 tablet by mouth Daily., Disp: 90 tablet, Rfl: 1  •  Cholecalciferol (Vitamin D3) 10 MCG (400 UNIT) capsule, Take 4,000 Units by mouth Daily., Disp: , Rfl:   •  NIFEdipine CC (ADALAT CC) 60 MG 24 hr tablet, Take 1 tablet by mouth Daily., Disp: 90 tablet, Rfl: 3  •  simvastatin (Zocor) 40 MG tablet, Take 1 tablet by mouth Every Night., Disp: 90 tablet, Rfl: 1  •  vitamin B-12 (CYANOCOBALAMIN) 1000 MCG tablet, Take 1,000 mcg by mouth Daily., Disp: , Rfl:   Allergies   Allergen Reactions   • Peanut-Containing Drug Products Hives     Social History     Socioeconomic History   • Marital status:      Spouse name: Not on file   • Number of children: 2   • Years of education: Not on file   • Highest education level: Not on file   Tobacco Use   • Smoking status: Never Smoker   • Smokeless tobacco: Never Used   Substance and Sexual Activity   • Alcohol use: Yes     Comment: on occasion   • Drug use: Never     Family History   Problem Relation Age of Onset   • Pancreatic cancer Mother         Passed away in 2002   • Diabetes Father    • Heart attack Father         passed in 2000   • Hypertension Father    • Colon cancer Father    • Diabetes Brother    • Hypertension Brother    • Hypertension Brother    • Stroke Brother    • Sarcoidosis Daughter    • Breast cancer Neg Hx    • Ovarian cancer Neg Hx      Review of Systems   Constitutional: Negative for chills, fever, malaise/fatigue, night sweats and weight loss.   HENT: Negative.  Negative for hearing loss, odynophagia and sore throat.    Eyes: Negative.    Cardiovascular: Positive for dyspnea on exertion and leg swelling. Negative for chest pain, orthopnea and palpitations.   Respiratory: Negative for cough and hemoptysis.    Endocrine: Negative for cold intolerance, heat intolerance, polydipsia, polyphagia and polyuria.   Hematologic/Lymphatic: Bruises/bleeds easily.   Skin: Negative.  Negative for itching and rash.   Musculoskeletal: Positive for  "arthritis, joint pain and muscle cramps. Negative for joint swelling and myalgias.   Gastrointestinal: Negative.  Negative for abdominal pain, constipation, diarrhea, hematemesis, hematochezia, melena, nausea and vomiting.   Genitourinary: Negative.  Negative for dysuria, frequency and hematuria.   Neurological: Negative.  Negative for focal weakness, headaches, numbness and seizures.   Psychiatric/Behavioral: Negative for suicidal ideas. The patient has insomnia.    Allergic/Immunologic: Positive for environmental allergies.   All other systems reviewed and are negative.    Vitals:    05/24/21 1159   BP: 122/79   BP Location: Right arm   Patient Position: Sitting   Pulse: 74   Temp: 98.4 °F (36.9 °C)   SpO2: 99%   Weight: 56.2 kg (124 lb)   Height: 147.3 cm (58\")      Physical Exam  Vitals and nursing note reviewed.   Constitutional:       General: She is not in acute distress.     Appearance: She is well-developed.   HENT:      Head: Normocephalic.   Eyes:      Conjunctiva/sclera: Conjunctivae normal.      Pupils: Pupils are equal, round, and reactive to light.   Neck:      Thyroid: No thyroid mass or thyromegaly.   Cardiovascular:      Rate and Rhythm: Normal rate.      Heart sounds: No murmur heard.   No friction rub. No gallop.    Pulmonary:      Breath sounds: No wheezing, rhonchi or rales.   Abdominal:      General: Bowel sounds are normal. There is no distension.      Palpations: Abdomen is soft. There is no mass.      Tenderness: There is no abdominal tenderness.   Musculoskeletal:         General: No deformity. Normal range of motion.      Cervical back: Normal range of motion.   Skin:     Findings: No petechiae.      Nails: There is no clubbing.   Neurological:      Mental Status: She is oriented to person, place, and time.      Cranial Nerves: No cranial nerve deficit.      Sensory: No sensory deficit.   Psychiatric:         Behavior: Behavior normal.         The ROS, past medical history, surgical " history, family history, social history and vitals were reviewed by myself and corrected as needed.      Labs/Imaging:  I have obtained and reviewed the medical records from Dr. Redd, including the echocardiogram demonstrating severe aortic stenosis.    Assessment/Plan:   The patient is a 66-year-old female who is being referred for aortic valve stenosis. She remains quite active at this time.  Her 2 echoes were done in 2020.  These both reveal a mean gradient of, approximately, 40 mmHg.  I personally obtained and reviewed these.  I concur with the interpretation that she has severe aortic stenosis.  I concur with a gradient of 40 mm or thereabouts.  And a V-max of greater than 400 cm a second.  At this point, I do believe that a TAVR would be her best option.  I discussed the open procedure SAVR versus TAVR.  I recommended TAVR as probably being her best option and I have discussed the technique of this.  I discussed the risk and alternatives.  She appears to understand all of this.  At this time she appears to be relatively asymptomatic. She is a  and does not do a lot of physical activity.  She does note more shortness of breath when she becomes real active.  She will go home and think about it and contact us if she desires to proceed.  I would like to thank you for this consultation.    Patient Active Problem List   Diagnosis   • Essential hypertension   • Mixed hyperlipidemia   • Arthritis   • Nonrheumatic aortic valve stenosis   • Aortic valve disorder       CC: LIBBY Lynn MD Regina Fugate editing for Jaylan Mckay MD      I, Jaylan Mckay MD, have read and agree with the editing done by Lauren Ferrer, .

## 2021-05-25 PROBLEM — I35.9 AORTIC VALVE DISORDER: Status: ACTIVE | Noted: 2021-05-25

## 2021-09-10 DIAGNOSIS — I10 ESSENTIAL HYPERTENSION: ICD-10-CM

## 2021-09-10 DIAGNOSIS — E78.2 MIXED HYPERLIPIDEMIA: ICD-10-CM

## 2021-09-10 RX ORDER — BENAZEPRIL HYDROCHLORIDE 40 MG/1
TABLET, FILM COATED ORAL
Qty: 30 TABLET | Refills: 0 | Status: SHIPPED | OUTPATIENT
Start: 2021-09-10 | End: 2022-04-04 | Stop reason: SDUPTHER

## 2021-11-01 ENCOUNTER — OFFICE VISIT (OUTPATIENT)
Dept: CARDIOLOGY | Facility: CLINIC | Age: 66
End: 2021-11-01

## 2021-11-01 VITALS
HEART RATE: 82 BPM | DIASTOLIC BLOOD PRESSURE: 74 MMHG | SYSTOLIC BLOOD PRESSURE: 110 MMHG | OXYGEN SATURATION: 97 % | HEIGHT: 58 IN | WEIGHT: 125 LBS | BODY MASS INDEX: 26.24 KG/M2

## 2021-11-01 DIAGNOSIS — E78.2 MIXED HYPERLIPIDEMIA: ICD-10-CM

## 2021-11-01 DIAGNOSIS — I35.0 NONRHEUMATIC AORTIC VALVE STENOSIS: Primary | ICD-10-CM

## 2021-11-01 DIAGNOSIS — I10 ESSENTIAL HYPERTENSION: ICD-10-CM

## 2021-11-01 PROCEDURE — 99214 OFFICE O/P EST MOD 30 MIN: CPT | Performed by: NURSE PRACTITIONER

## 2021-11-01 NOTE — PROGRESS NOTES
Subjective:     Encounter Date:11/01/2021    Primary Care Physician: Alia Vasquez APRN      Patient ID: Greta Marino is a 66 y.o. female.    Chief Complaint:AVD, Hypertension, Hyperlipidemia, and AVS    PROBLEM LIST:  1. Aortic stenosis   a. Echocardiogram, 5/11/2016: EF 70%. LV has mild concentric hypertrophy. AV was trileaflet. Moderate stenosis mean 20-25 mmHg, max 45-50 mmHg. Moderate AR, MR, and TR. LA mildly dilated.   b. Echocardiogram, 8/17/2020: LVEF 66%. Moderate AI and severe AS with mean gradient 42 mmHg.  Mild MR and TR.  RVSP 23  mmHg. Grade 1 diastolic dysfunction\  c. Echocardiogram, 4/26/2021: EF 60%. Severe aortic valve stenosis is present. Aortic valve area is 0.8 cm2. AV max pressure gradient 76 mmHg, mean 39 mmHg. Mild to moderate AR. Mild MR.  2. Hypertension  3. Hyperlipidemia.   4. Arthritis  5. Asthma  6. Surgeries:  a. Ankle surgery  b. Breast biopsy  c. Knee surgery      Allergies   Allergen Reactions   • Peanut-Containing Drug Products Hives         Current Outpatient Medications:   •  benazepril (LOTENSIN) 40 MG tablet, TAKE ONE TABLET BY MOUTH DAILY, Disp: 30 tablet, Rfl: 0  •  Cholecalciferol (Vitamin D3) 10 MCG (400 UNIT) capsule, Take 4,000 Units by mouth Daily., Disp: , Rfl:   •  NIFEdipine CC (ADALAT CC) 60 MG 24 hr tablet, Take 1 tablet by mouth Daily., Disp: 90 tablet, Rfl: 3  •  simvastatin (Zocor) 40 MG tablet, Take 1 tablet by mouth Every Night., Disp: 90 tablet, Rfl: 1  •  vitamin B-12 (CYANOCOBALAMIN) 1000 MCG tablet, Take 1,000 mcg by mouth Daily., Disp: , Rfl:         History of Present Illness    Patient is a 66-year-old  female who is being seen today for follow-up of severe aortic stenosis.  Since last being seen she does feel that she becomes a little bit more fatigued with activity.  Does have some shortness of breath but she is unclear how much of this is related to wearing a mask.  No reported syncope, near-syncope, or edema.  Notes that she met  "with CT surgeons.  She is considering undergoing TAVR early next year.    The following portions of the patient's history were reviewed and updated as appropriate: allergies, current medications, past family history, past medical history, past social history, past surgical history and problem list.      Social History     Tobacco Use   • Smoking status: Never Smoker   • Smokeless tobacco: Never Used   Vaping Use   • Vaping Use: Never used   Substance Use Topics   • Alcohol use: Yes     Comment: on occasion   • Drug use: Never         Review of Systems   Constitutional: Positive for malaise/fatigue.   Cardiovascular: Positive for dyspnea on exertion. Negative for chest pain, leg swelling, palpitations and syncope.   Respiratory: Negative.  Negative for shortness of breath.    Hematologic/Lymphatic: Negative for bleeding problem. Does not bruise/bleed easily.   Skin: Negative for rash.   Musculoskeletal: Negative for muscle weakness and myalgias.   Gastrointestinal: Negative for heartburn, nausea and vomiting.   Neurological: Negative for dizziness, light-headedness, loss of balance and numbness.          Objective:   /74   Pulse 82   Ht 147.3 cm (57.99\")   Wt 56.7 kg (125 lb)   SpO2 97%   BMI 26.13 kg/m²         Constitutional:       Appearance: Well-developed.   Pulmonary:      Breath sounds: Normal breath sounds.   Cardiovascular:      Regular rhythm.      No gallop. No S3 and S4 gallop.   Abdominal:      General: Bowel sounds are normal.      Palpations: Abdomen is soft. There is no abdominal mass.      Tenderness: There is no abdominal tenderness.   Skin:     General: Skin is warm and dry.      Findings: No rash.   Neurological:      Mental Status: Alert and oriented to person, place, and time.         Procedures          Assessment:   Assessment/Plan      Diagnoses and all orders for this visit:    1. Nonrheumatic aortic valve stenosis (Primary), severe by last echocardiogram.  Currently beginning to " have symptoms.    2. Essential hypertension, stable.    3. Mixed hyperlipidemia, on statin therapy.  Controlled.  Last LDL 71 in March.      Plan:  1. Given that patient is now beginning to have symptoms and would like to proceed with TAVR will initiate further evaluation.  2. Will refer patient to heart and valve center for further pre-TAVR testing.  3. We will have patient scheduled in the next 1 to 2 months for outpatient SOURAV and cardiac catheterization.  4. Follow-up in 6 months time or sooner if needed.       Karma SOUZA     Dictated utilizing Dragon dictation

## 2022-01-26 ENCOUNTER — OFFICE VISIT (OUTPATIENT)
Dept: CARDIOLOGY | Facility: HOSPITAL | Age: 67
End: 2022-01-26

## 2022-01-26 VITALS
HEART RATE: 72 BPM | BODY MASS INDEX: 25.27 KG/M2 | HEIGHT: 58 IN | TEMPERATURE: 97.9 F | RESPIRATION RATE: 16 BRPM | SYSTOLIC BLOOD PRESSURE: 136 MMHG | OXYGEN SATURATION: 98 % | DIASTOLIC BLOOD PRESSURE: 73 MMHG | WEIGHT: 120.4 LBS

## 2022-01-26 DIAGNOSIS — I35.0 NONRHEUMATIC AORTIC VALVE STENOSIS: Primary | ICD-10-CM

## 2022-01-26 DIAGNOSIS — R09.89 BRUIT OF LEFT CAROTID ARTERY: ICD-10-CM

## 2022-01-26 DIAGNOSIS — I10 ESSENTIAL HYPERTENSION: ICD-10-CM

## 2022-01-26 PROCEDURE — 99214 OFFICE O/P EST MOD 30 MIN: CPT | Performed by: NURSE PRACTITIONER

## 2022-01-26 NOTE — PROGRESS NOTES
TAVR APRN Evaluation    Greta Marino, 1955, [id number]     01/26/22    PCP: Alia Vasquez APRN  Primary Cardiologist: Jaylan Redd MD    TAVR Team:  1.  Jaylan Mckay MD  2.  Marty Rosales MD  3.  Pari Armstrong MD  4.  Dorian Camara MD  5.  Casey Walls MD  6.  George Zayas MD    Chief Complaint: Establish Care (aortic valve stenois and pre-TAVR)      Identification: This is a 66 y.o. year old female from [address] [place] [address].    History of Present Illness: Ms. Marino was referred for consideration of TAVR due to developing symptoms of DUNCAN.  Her echo has met severe AS criteria for over a year, but her symptoms are gradually coming on.  Patient met with Dr. Mckay in May 2021 and she is now considering TAVR.  She wishes to learn more about the pre-procedure testing and procedure details today.       PROBLEM LIST:  1. Aortic stenosis   a. Echocardiogram, 5/11/2016: EF 70%. LV has mild concentric hypertrophy. AV was trileaflet. Moderate stenosis mean 20-25 mmHg, max 45-50 mmHg. Moderate AR, MR, and TR. LA mildly dilated.   b. Echocardiogram, 8/17/2020: LVEF 66%. Moderate AI and severe AS with mean gradient 42 mmHg.  Mild MR and TR.  RVSP 23  mmHg. Grade 1 diastolic dysfunction\  c. Echocardiogram, 4/26/2021: EF 60%. Severe aortic valve stenosis is present. Aortic valve area is 0.8 cm2. AV max pressure gradient 76 mmHg, mean 39 mmHg. Mild to moderate AR. Mild MR.  2. Hypertension  3. Hyperlipidemia.   4. Arthritis  5. Asthma  6. Surgeries:  a. Ankle surgery  b. Breast biopsy  c. Knee surgery    Patient Active Problem List   Diagnosis   • Essential hypertension   • Mixed hyperlipidemia   • Arthritis   • Nonrheumatic aortic valve stenosis   • Aortic valve disorder       Past Surgical History:  Past Surgical History:   Procedure Laterality Date   • ANKLE SURGERY Left 1980's   • BREAST BIOPSY Right 1995   • COLONOSCOPY  8/24/2020, 2005 8/24/2020- Dr. Bowling. 2005-  [facility] in [place], [place]   • KNEE SURGERY Right 12/20/2018    meniscus repair       Allergies:  Peanut-containing drug products    Social History:  Social History     Socioeconomic History   • Marital status:    • Number of children: 2   • Highest education level: High school graduate   Tobacco Use   • Smoking status: Never Smoker   • Smokeless tobacco: Never Used   Vaping Use   • Vaping Use: Never used   Substance and Sexual Activity   • Alcohol use: Yes     Comment: on occasion   • Drug use: Never       Current Medications:  Current Outpatient Medications:   •  benazepril (LOTENSIN) 40 MG tablet, TAKE ONE TABLET BY MOUTH DAILY, Disp: 30 tablet, Rfl: 0  •  Cholecalciferol (Vitamin D3) 10 MCG (400 UNIT) capsule, Take 4,000 Units by mouth Daily., Disp: , Rfl:   •  NIFEdipine CC (ADALAT CC) 60 MG 24 hr tablet, Take 1 tablet by mouth Daily., Disp: 90 tablet, Rfl: 3  •  simvastatin (Zocor) 40 MG tablet, Take 1 tablet by mouth Every Night., Disp: 90 tablet, Rfl: 1  •  vitamin B-12 (CYANOCOBALAMIN) 1000 MCG tablet, Take 1,000 mcg by mouth Daily., Disp: , Rfl:     Review of Systems:  Review of Systems   Constitutional: Positive for malaise/fatigue.        Mild   HENT: Negative.    Eyes: Negative.    Cardiovascular: Positive for dyspnea on exertion. Negative for chest pain, claudication, cyanosis, irregular heartbeat, leg swelling, near-syncope, orthopnea, palpitations, paroxysmal nocturnal dyspnea and syncope.   Respiratory: Negative.  Negative for cough and sleep disturbances due to breathing.    Endocrine: Negative.    Hematologic/Lymphatic: Negative.    Skin: Negative.    Musculoskeletal: Negative.    Gastrointestinal: Negative.    Genitourinary: Negative.    Neurological: Negative.    Psychiatric/Behavioral: Negative.    Allergic/Immunologic: Negative.         Physical Exam:  Constitutional:       Appearance: Healthy appearance. Not in distress.   Eyes:      Conjunctiva/sclera: Conjunctivae normal.       "Pupils: Pupils are equal, round, and reactive to light.   Neck:      Thyroid: No thyromegaly.      Lymphadenopathy: No cervical adenopathy.   Pulmonary:      Effort: Pulmonary effort is normal.      Breath sounds: Normal breath sounds. No wheezing. No rhonchi. No rales.   Cardiovascular:      PMI at left midclavicular line. Normal rate. Regular rhythm.      Murmurs: There is a grade 3/6 mid frequency harsh, blowing midsystolic murmur at the URSB, radiating to the neck.      No gallop. No click. No rub.   Pulses:     Intact distal pulses.   Edema:     Peripheral edema absent.   Musculoskeletal: Normal range of motion.         General: No deformity.      Extremities: No clubbing present.     Cervical back: Neck supple. Skin:     General: Skin is warm and dry.   Neurological:      Mental Status: Alert and oriented to person, place and time.         Vitals:    01/26/22 1020 01/26/22 1021 01/26/22 1022   BP: 132/66 133/74 136/73   BP Location: Right arm Left arm Left arm   Patient Position: Sitting Standing Sitting   Cuff Size: Adult Adult Adult   Pulse: 63 75 72   Resp:   16   Temp: 97.9 °F (36.6 °C) 97.9 °F (36.6 °C) 97.9 °F (36.6 °C)   TempSrc: Temporal Temporal Temporal   SpO2: 98% 96% 98%   Weight:   54.6 kg (120 lb 6.4 oz)   Height:   147.3 cm (58\")       Diagnostic Data:  Transthoracic echo: 4/26/21  · Estimated left ventricular EF = 60%  · Severe aortic valve stenosis is present. Aortic valve area is 0.8 cm2.  · Aortic valve maximum pressure gradient is 76 mmHg. Aortic valve mean pressure gradient is 39 mmHg.  · Mild to moderate aortic valve regurgitation  · Mild mitral valve regurgitation        Functional Assessment Data:    KCCQ12 Questionnaire Score: (see scanned copy): 59/70 NYHA class I    [place] Basic Activities of Daily Living (ADL) Scale    Bathing (sponge bath, tub bath, or shower).  Receives either no assistance,   or assistance with bathing only on body part.   Yes    Dressing Gets clothes and dresses " without any assistance, except for  tying shoes.        Yes    Toileting Goes to toilet room, uses toilet, arranges clothes, and returns  without any assistance (may use cane or walker for support and may use  bedpan / urinal at night.      Yes    Transferring Moves into and out of bed and chair without assistance  (may use cane or walker)      Yes    Continence Controls bowel and bladder completely without occasional  accidents        Yes    Feeding Feeds self without assistance (except for help with cutting meat  or buttering bread)       Yes        Total (Number of Yesses of 6) 6/6      [place] Instrumental Activities of Daily Living Scale (IADL)    Ability to Use Telephone   · Operates telephone on own initiative.  Looks up and dials numbers, etc.         1  Shopping  Takes care of all shopping needs indepently  1     Food Preparation  · Plans, prepares, and serves adequate meals independently          1  Housekeeping  · Maintains house alone or with occasional assistance,   e.g. heavy work domestic help    1     Laundry  Does personal laundry completely   1     Mode of Transportation  · Travels independently on public transportation or drives own car          1  Responsibility for Own Medications  · Is responsible for taking medications in correct dosages at correct time          1  Ability to Handle Finances  · Manages financial matters independently (budgets, writes checks,   pays rent, bills, goes to bank), collects and keeps track of income          1     Total (Number of Instrumental Activities of 8) 8/8         Five Meter Walk Test    Utilized Walking Aid? No     Walk 1: 6.04 s/5m     Walk 2: 5.38 s/5m     Walk 3: 4.49 s/5m    Five Meter Walk Average: 5.3 s/5m    Gait Speed: Normal (Average < or = 6 s/5m)      STS risk of mortality with open AVR    http://riskcalc.sts.org/stswebriskcalc/calculate       Creatinine Clearance: 71  "ml/min  https://reference.[employer]/calculator/creatinine-clearance-cockcroft-gault    Assessment/ Plan:     ICD-10-CM ICD-9-CM   1. Nonrheumatic aortic valve stenosis.  Low risk 66 y.o. female with NYHA class I HFpEF symptoms.  These symptoms have increased as compared to last year when she met with Dr. Mckay.  She wishes to complete her pre-TAVR testing and then meet again with Dr. Mckay to make intervention plans.   I35.0 424.1   2. Essential hypertension.  Controlled.  Lotensin, Adalat CC I10 401.9   3. Dyslipidemia.  Zocor  R09.89 785.9       TAVR education materials reviewed with patient today.  This included printed brochure detailing pathophysiology of aortic stenosis, patient specific aortic stenosis symptoms, and treatment options (medical therapy, surgical aortic valve replacement, and transcatheter aortic valve replacement).  A model of the valve used to explain TAVR.      We discussed patient's goals of care: \"continue to be well enough to work and enjoy her children/grandchildren\".  We reviewed the [facility] Cardiosmart Shared Decision Making Tool for treatment of Aortic Stenosis to again compare/contrast the options of TAVR/ SAVR/ medical management.      Patient has Living Will (N) and Power of  (N).    Our talk also included procedural details, procedure risks, anticipated pre-op and post-op expectations, as well as follow up visit schedule @ one month and one year.  Further discussion and final decision making will occur with Multi- Disciplinary Heart Team following the completion of pre-requisite testing and clinic re-visit with Dr. Mckay.    Jaquelin Dominique, LIBBY, 01/26/22, 14:37 EST  "

## 2022-03-23 NOTE — PROGRESS NOTES
TAVR Pre-Op Checklist    Patient Name: Greta Marino   66 y.o. [id number]  Residence: [place]    Referral Date: [birth year] :1955   Ht:58 in (147.3 cm) Wt: 120# (54.6 kg)    Referring Cardiologist: Ivania  BMI: 25.2 kg/m2    Initial TTE date: Adult Transthoracic Echo Complete W/ Cont if Necessary Per Protocol (2021 14:42)    CARLYLE: 0.8cm AV mean: 39 AV Vmax: 4.36  LVEF: 60%    Coordinator H&P:Progress Notes by Jaquelin Dominique APRN (2022 09:45)    CT Surgery Consult:Progress Notes by Susan Fraire APRN (2022 12:00) Progress Notes by Jaylan Mckay MD (2021 12:00)    STS Score: 1.94% Creatinine Clearance: 71 ml/min   Functional Assessment: C    Allergy (Contrast):Peanut-containing drug products  Pre-op meds:NA    Anticoagulated: No Last dose:NA Heparin or Lovenox Bridge: NA    CTA Date: CT Angio TAVR Chest Abdomen Pelvis (2022 11:03)MRI abdomen w wo contrast mrcp (2022 07:37)NM Renal With Flow & Function With Pharmacological Intervention (2022 09:28)    Additional Specialty Consults:Progress Notes by Shahid Lopez MD (2022 16:00)  Progress Notes by Hayley Mckeon APRN (2022 11:30)    CTA 3D: CARDIOLOGY VISIT - SCAN - TAVR 3D MENSIO (2022)    Left Coronary Ht: 12.9  Right Coronary Ht: 16.8  Annulus Area: 400.1 mm2    CTA Important findings: bilateral nephrolithiasis with evidence of obstruction right kidney.  Relatively little atherosclerotic disease aorta/iliac vessels.  Pancreatic cysts appear to be IPMN    Cardiac cath:   Cardiac Catheterization/Vascular Study (2022 13:27)    Physician: Ivania   Important findings: 40% RCA with normal IFR    SOURAV:  Adult Transesophageal Echo (SOURAV) W/ Cont if Necessary Per Protocol (2022 11:18)    Physician: Aslam  SOURAV annulus: Bicuspid 2.3 cm  Projected TAVR Prosthesis Size: 23 mm Nicolas    Carotid duplex: Duplex Carotid Ultrasound CAR (2022 08:29)  Normal     Dobutamine  GXT:NA     EKG rhythm:  ECG 12 Lead (10/19/2020)  NSR    PM/ Last download :NA    PFT (FEV1):Pulmonary Function Test Spirometry (07/26/2022 10:56)    Important meds: no aspirin, no NOAC    PAT lab review:  BNP: NA BUN/Creatinine: 17/0.74 H&H: 13.2/39.7 PLTS:192    P2Y12: 242 HGA1C: 5.0% CXR:NAD      TAVR Procedure Date: 7/28/22

## 2022-03-25 ENCOUNTER — HOSPITAL ENCOUNTER (OUTPATIENT)
Dept: CARDIOLOGY | Facility: HOSPITAL | Age: 67
Discharge: HOME | End: 2022-03-25

## 2022-03-25 ENCOUNTER — HOSPITAL ENCOUNTER (OUTPATIENT)
Dept: CT IMAGING | Facility: HOSPITAL | Age: 67
Discharge: HOME | End: 2022-03-25

## 2022-03-25 VITALS — WEIGHT: 122 LBS | HEIGHT: 58 IN | BODY MASS INDEX: 25.61 KG/M2

## 2022-03-25 VITALS
OXYGEN SATURATION: 98 % | SYSTOLIC BLOOD PRESSURE: 141 MMHG | HEART RATE: 64 BPM | TEMPERATURE: 97.4 F | RESPIRATION RATE: 16 BRPM | DIASTOLIC BLOOD PRESSURE: 70 MMHG

## 2022-03-25 DIAGNOSIS — I35.0 NONRHEUMATIC AORTIC VALVE STENOSIS: ICD-10-CM

## 2022-03-25 DIAGNOSIS — I10 ESSENTIAL HYPERTENSION: ICD-10-CM

## 2022-03-25 DIAGNOSIS — R09.89 BRUIT OF LEFT CAROTID ARTERY: ICD-10-CM

## 2022-03-25 DIAGNOSIS — E78.2 MIXED HYPERLIPIDEMIA: ICD-10-CM

## 2022-03-25 LAB
BH CV XLRA MEAS LEFT DIST CCA EDV: 33.4 CM/SEC
BH CV XLRA MEAS LEFT DIST CCA PSV: 103 CM/SEC
BH CV XLRA MEAS LEFT DIST ICA EDV: 35.2 CM/SEC
BH CV XLRA MEAS LEFT DIST ICA PSV: 97.3 CM/SEC
BH CV XLRA MEAS LEFT ICA/CCA RATIO: 1
BH CV XLRA MEAS LEFT MID CCA EDV: 33.4 CM/SEC
BH CV XLRA MEAS LEFT MID CCA PSV: 97.3 CM/SEC
BH CV XLRA MEAS LEFT MID ICA EDV: 31.1 CM/SEC
BH CV XLRA MEAS LEFT MID ICA PSV: 93.2 CM/SEC
BH CV XLRA MEAS LEFT PROX CCA EDV: 31.7 CM/SEC
BH CV XLRA MEAS LEFT PROX CCA PSV: 106 CM/SEC
BH CV XLRA MEAS LEFT PROX ECA EDV: 14.7 CM/SEC
BH CV XLRA MEAS LEFT PROX ECA PSV: 73.3 CM/SEC
BH CV XLRA MEAS LEFT PROX ICA EDV: 25.1 CM/SEC
BH CV XLRA MEAS LEFT PROX ICA PSV: 60.5 CM/SEC
BH CV XLRA MEAS LEFT PROX SCLA PSV: 112 CM/SEC
BH CV XLRA MEAS LEFT VERTEBRAL A EDV: 27.6 CM/SEC
BH CV XLRA MEAS LEFT VERTEBRAL A PSV: 62.1 CM/SEC
BH CV XLRA MEAS RIGHT DIST CCA EDV: 32.2 CM/SEC
BH CV XLRA MEAS RIGHT DIST CCA PSV: 92 CM/SEC
BH CV XLRA MEAS RIGHT DIST ICA EDV: 34.6 CM/SEC
BH CV XLRA MEAS RIGHT DIST ICA PSV: 90.3 CM/SEC
BH CV XLRA MEAS RIGHT ICA/CCA RATIO: 0.91
BH CV XLRA MEAS RIGHT MID CCA EDV: 29.9 CM/SEC
BH CV XLRA MEAS RIGHT MID CCA PSV: 101 CM/SEC
BH CV XLRA MEAS RIGHT MID ICA EDV: 36.9 CM/SEC
BH CV XLRA MEAS RIGHT MID ICA PSV: 91.5 CM/SEC
BH CV XLRA MEAS RIGHT PROX CCA EDV: 28.1 CM/SEC
BH CV XLRA MEAS RIGHT PROX CCA PSV: 97.9 CM/SEC
BH CV XLRA MEAS RIGHT PROX ECA EDV: 17.6 CM/SEC
BH CV XLRA MEAS RIGHT PROX ECA PSV: 81.5 CM/SEC
BH CV XLRA MEAS RIGHT PROX ICA EDV: 22.9 CM/SEC
BH CV XLRA MEAS RIGHT PROX ICA PSV: 81.5 CM/SEC
BH CV XLRA MEAS RIGHT PROX SCLA PSV: 110 CM/SEC
BH CV XLRA MEAS RIGHT VERTEBRAL A EDV: 16.4 CM/SEC
BH CV XLRA MEAS RIGHT VERTEBRAL A PSV: 51.6 CM/SEC
LEFT ARM BP: NORMAL MMHG
MAXIMAL PREDICTED HEART RATE: 155 BPM
RIGHT ARM BP: NORMAL MMHG
STRESS TARGET HR: 132 BPM

## 2022-03-25 PROCEDURE — 71275 CT ANGIOGRAPHY CHEST: CPT

## 2022-03-25 PROCEDURE — 93880 EXTRACRANIAL BILAT STUDY: CPT

## 2022-03-25 PROCEDURE — 74174 CTA ABD&PLVS W/CONTRAST: CPT

## 2022-03-25 PROCEDURE — 93880 EXTRACRANIAL BILAT STUDY: CPT | Performed by: INTERNAL MEDICINE

## 2022-03-25 PROCEDURE — 0 IOPAMIDOL PER 1 ML: Performed by: NURSE PRACTITIONER

## 2022-03-25 PROCEDURE — 82565 ASSAY OF CREATININE: CPT

## 2022-03-25 RX ORDER — LIDOCAINE HYDROCHLORIDE 10 MG/ML
5 INJECTION, SOLUTION EPIDURAL; INFILTRATION; INTRACAUDAL; PERINEURAL AS NEEDED
Status: DISCONTINUED | OUTPATIENT
Start: 2022-03-25 | End: 2022-03-26 | Stop reason: HOSPADM

## 2022-03-25 RX ORDER — METOPROLOL TARTRATE 50 MG/1
50 TABLET, FILM COATED ORAL ONCE AS NEEDED
Status: COMPLETED | OUTPATIENT
Start: 2022-03-25 | End: 2022-03-25

## 2022-03-25 RX ORDER — METOPROLOL TARTRATE 50 MG/1
100 TABLET, FILM COATED ORAL ONCE AS NEEDED
Status: COMPLETED | OUTPATIENT
Start: 2022-03-25 | End: 2022-03-25

## 2022-03-25 RX ORDER — SODIUM CHLORIDE 0.9 % (FLUSH) 0.9 %
3 SYRINGE (ML) INJECTION EVERY 12 HOURS SCHEDULED
Status: DISCONTINUED | OUTPATIENT
Start: 2022-03-25 | End: 2022-03-26 | Stop reason: HOSPADM

## 2022-03-25 RX ORDER — SODIUM CHLORIDE 0.9 % (FLUSH) 0.9 %
10 SYRINGE (ML) INJECTION AS NEEDED
Status: DISCONTINUED | OUTPATIENT
Start: 2022-03-25 | End: 2022-03-26 | Stop reason: HOSPADM

## 2022-03-25 RX ADMIN — IOPAMIDOL 100 ML: 755 INJECTION, SOLUTION INTRAVENOUS at 11:02

## 2022-03-25 RX ADMIN — METOPROLOL TARTRATE 50 MG: 50 TABLET, FILM COATED ORAL at 09:14

## 2022-03-26 DIAGNOSIS — E78.2 MIXED HYPERLIPIDEMIA: ICD-10-CM

## 2022-03-27 LAB — CREAT BLDA-MCNC: 0.7 MG/DL (ref 0.6–1.3)

## 2022-03-28 ENCOUNTER — TELEPHONE (OUTPATIENT)
Dept: INTERNAL MEDICINE | Facility: CLINIC | Age: 67
End: 2022-03-28

## 2022-03-28 DIAGNOSIS — Z00.00 ANNUAL PHYSICAL EXAM: Primary | ICD-10-CM

## 2022-03-28 DIAGNOSIS — R79.89 LOW VITAMIN D LEVEL: ICD-10-CM

## 2022-03-28 DIAGNOSIS — E55.9 VITAMIN D DEFICIENCY, UNSPECIFIED: ICD-10-CM

## 2022-03-28 DIAGNOSIS — E78.2 MIXED HYPERLIPIDEMIA: ICD-10-CM

## 2022-03-28 DIAGNOSIS — Z11.59 ENCOUNTER FOR HEPATITIS C SCREENING TEST FOR LOW RISK PATIENT: ICD-10-CM

## 2022-03-28 DIAGNOSIS — E53.8 LOW VITAMIN B12 LEVEL: ICD-10-CM

## 2022-03-28 RX ORDER — BENAZEPRIL HYDROCHLORIDE 40 MG/1
TABLET, FILM COATED ORAL
Qty: 30 TABLET | Refills: 0 | OUTPATIENT
Start: 2022-03-28

## 2022-03-28 RX ORDER — SIMVASTATIN 40 MG
TABLET ORAL
Qty: 90 TABLET | Refills: 1 | OUTPATIENT
Start: 2022-03-28

## 2022-03-28 NOTE — TELEPHONE ENCOUNTER
Rx Refill Note  Requested Prescriptions     Pending Prescriptions Disp Refills   • benazepril (LOTENSIN) 40 MG tablet [Pharmacy Med Name: BENAZEPRIL HCL 40 MG TABLET] 30 tablet 0     Sig: TAKE ONE TABLET BY MOUTH DAILY      Last filled:  Last office visit with prescribing clinician: 3/8/2021      Next office visit with prescribing clinician: 3/26/2022     April BLOSSOM Allan MA  03/28/22, 15:19 EDT

## 2022-03-28 NOTE — TELEPHONE ENCOUNTER
Rx Refill Note  Requested Prescriptions     Pending Prescriptions Disp Refills   • simvastatin (ZOCOR) 40 MG tablet [Pharmacy Med Name: SIMVASTATIN 40 MG TABLET] 90 tablet 1     Sig: TAKE ONE TABLET BY MOUTH ONCE NIGHTLY      Last filled:  Last office visit with prescribing clinician: 3/8/2021      Next office visit with prescribing clinician: Visit date not found     April BLOSSOM Allan MA  03/28/22, 15:18 EDT

## 2022-03-28 NOTE — TELEPHONE ENCOUNTER
Just an appointment for medication refills as I couldn't get her in a physical time slot before she ran out of medication.

## 2022-03-28 NOTE — TELEPHONE ENCOUNTER
Informed the patient that she will need to be seen as it has been over a year. I was able to find her an appointment on Monday but not early for fasting labs. Can lab work be entered for her so she can go ahead and get them drawn this week before her appointment when she can come in early?

## 2022-03-28 NOTE — TELEPHONE ENCOUNTER
Caller: Greta Marino    Relationship: Self    Best call back number: [phone]      What orders are you requesting (i.e. lab or imaging): LAB FOR MED REFILL    simvastatin (Zocor) 40 MG tablet         In what timeframe would the patient need to come in: ASAP    Where will you receive your lab/imaging services:     Additional notes:   PT CALLED TO REQUEST TO HAVE LAB WORK DONE SO THAT SHE CAN GET REFILL FOR RX  SIMVASTATIN.

## 2022-03-29 DIAGNOSIS — E78.2 MIXED HYPERLIPIDEMIA: ICD-10-CM

## 2022-03-29 RX ORDER — SIMVASTATIN 40 MG
TABLET ORAL
Qty: 14 TABLET | Refills: 0 | Status: SHIPPED | OUTPATIENT
Start: 2022-03-29 | End: 2022-04-04 | Stop reason: SDUPTHER

## 2022-03-29 NOTE — TELEPHONE ENCOUNTER
Informed the patient that lab orders were in and she would need to be fasting. She verbalized a good understanding

## 2022-04-01 ENCOUNTER — LAB (OUTPATIENT)
Dept: LAB | Facility: HOSPITAL | Age: 67
End: 2022-04-01

## 2022-04-01 DIAGNOSIS — E78.2 MIXED HYPERLIPIDEMIA: ICD-10-CM

## 2022-04-01 DIAGNOSIS — Z11.59 ENCOUNTER FOR HEPATITIS C SCREENING TEST FOR LOW RISK PATIENT: ICD-10-CM

## 2022-04-01 DIAGNOSIS — Z00.00 ANNUAL PHYSICAL EXAM: ICD-10-CM

## 2022-04-01 DIAGNOSIS — E55.9 VITAMIN D DEFICIENCY, UNSPECIFIED: ICD-10-CM

## 2022-04-01 DIAGNOSIS — R79.89 LOW VITAMIN D LEVEL: ICD-10-CM

## 2022-04-01 DIAGNOSIS — E53.8 LOW VITAMIN B12 LEVEL: ICD-10-CM

## 2022-04-01 LAB
25(OH)D3 SERPL-MCNC: 39.9 NG/ML (ref 30–100)
ALBUMIN SERPL-MCNC: 4.7 G/DL (ref 3.5–5.2)
ALBUMIN/GLOB SERPL: 2 G/DL
ALP SERPL-CCNC: 84 U/L (ref 39–117)
ALT SERPL W P-5'-P-CCNC: 13 U/L (ref 1–33)
ANION GAP SERPL CALCULATED.3IONS-SCNC: 9.2 MMOL/L (ref 5–15)
AST SERPL-CCNC: 19 U/L (ref 1–32)
BASOPHILS # BLD AUTO: 0.05 10*3/MM3 (ref 0–0.2)
BASOPHILS NFR BLD AUTO: 1 % (ref 0–1.5)
BILIRUB SERPL-MCNC: 0.5 MG/DL (ref 0–1.2)
BUN SERPL-MCNC: 16 MG/DL (ref 8–23)
BUN/CREAT SERPL: 20.3 (ref 7–25)
CALCIUM SPEC-SCNC: 9.8 MG/DL (ref 8.6–10.5)
CHLORIDE SERPL-SCNC: 105 MMOL/L (ref 98–107)
CHOLEST SERPL-MCNC: 149 MG/DL (ref 0–200)
CO2 SERPL-SCNC: 25.8 MMOL/L (ref 22–29)
CREAT SERPL-MCNC: 0.79 MG/DL (ref 0.57–1)
DEPRECATED RDW RBC AUTO: 42.6 FL (ref 37–54)
EGFRCR SERPLBLD CKD-EPI 2021: 83.1 ML/MIN/1.73
EOSINOPHIL # BLD AUTO: 0.13 10*3/MM3 (ref 0–0.4)
EOSINOPHIL NFR BLD AUTO: 2.7 % (ref 0.3–6.2)
ERYTHROCYTE [DISTWIDTH] IN BLOOD BY AUTOMATED COUNT: 12.3 % (ref 12.3–15.4)
GLOBULIN UR ELPH-MCNC: 2.3 GM/DL
GLUCOSE SERPL-MCNC: 101 MG/DL (ref 65–99)
HCT VFR BLD AUTO: 39.1 % (ref 34–46.6)
HCV AB SER DONR QL: NORMAL
HDLC SERPL-MCNC: 36 MG/DL (ref 40–60)
HGB BLD-MCNC: 13.6 G/DL (ref 12–15.9)
IMM GRANULOCYTES # BLD AUTO: 0.01 10*3/MM3 (ref 0–0.05)
IMM GRANULOCYTES NFR BLD AUTO: 0.2 % (ref 0–0.5)
LDLC SERPL CALC-MCNC: 94 MG/DL (ref 0–100)
LDLC/HDLC SERPL: 2.58 {RATIO}
LYMPHOCYTES # BLD AUTO: 1.54 10*3/MM3 (ref 0.7–3.1)
LYMPHOCYTES NFR BLD AUTO: 32.3 % (ref 19.6–45.3)
MCH RBC QN AUTO: 32.6 PG (ref 26.6–33)
MCHC RBC AUTO-ENTMCNC: 34.8 G/DL (ref 31.5–35.7)
MCV RBC AUTO: 93.8 FL (ref 79–97)
MONOCYTES # BLD AUTO: 0.31 10*3/MM3 (ref 0.1–0.9)
MONOCYTES NFR BLD AUTO: 6.5 % (ref 5–12)
NEUTROPHILS NFR BLD AUTO: 2.73 10*3/MM3 (ref 1.7–7)
NEUTROPHILS NFR BLD AUTO: 57.3 % (ref 42.7–76)
NRBC BLD AUTO-RTO: 0 /100 WBC (ref 0–0.2)
PLATELET # BLD AUTO: 218 10*3/MM3 (ref 140–450)
PMV BLD AUTO: 10 FL (ref 6–12)
POTASSIUM SERPL-SCNC: 4.2 MMOL/L (ref 3.5–5.2)
PROT SERPL-MCNC: 7 G/DL (ref 6–8.5)
RBC # BLD AUTO: 4.17 10*6/MM3 (ref 3.77–5.28)
SODIUM SERPL-SCNC: 140 MMOL/L (ref 136–145)
TRIGL SERPL-MCNC: 100 MG/DL (ref 0–150)
TSH SERPL DL<=0.05 MIU/L-ACNC: 2.15 UIU/ML (ref 0.27–4.2)
VIT B12 BLD-MCNC: 1219 PG/ML (ref 211–946)
VLDLC SERPL-MCNC: 19 MG/DL (ref 5–40)
WBC NRBC COR # BLD: 4.77 10*3/MM3 (ref 3.4–10.8)

## 2022-04-01 PROCEDURE — 85025 COMPLETE CBC W/AUTO DIFF WBC: CPT | Performed by: STUDENT IN AN ORGANIZED HEALTH CARE EDUCATION/TRAINING PROGRAM

## 2022-04-01 PROCEDURE — 84443 ASSAY THYROID STIM HORMONE: CPT | Performed by: STUDENT IN AN ORGANIZED HEALTH CARE EDUCATION/TRAINING PROGRAM

## 2022-04-01 PROCEDURE — 82306 VITAMIN D 25 HYDROXY: CPT | Performed by: STUDENT IN AN ORGANIZED HEALTH CARE EDUCATION/TRAINING PROGRAM

## 2022-04-01 PROCEDURE — 80053 COMPREHEN METABOLIC PANEL: CPT | Performed by: STUDENT IN AN ORGANIZED HEALTH CARE EDUCATION/TRAINING PROGRAM

## 2022-04-01 PROCEDURE — 36415 COLL VENOUS BLD VENIPUNCTURE: CPT

## 2022-04-01 PROCEDURE — 80061 LIPID PANEL: CPT | Performed by: STUDENT IN AN ORGANIZED HEALTH CARE EDUCATION/TRAINING PROGRAM

## 2022-04-01 PROCEDURE — 82607 VITAMIN B-12: CPT | Performed by: STUDENT IN AN ORGANIZED HEALTH CARE EDUCATION/TRAINING PROGRAM

## 2022-04-01 PROCEDURE — 86803 HEPATITIS C AB TEST: CPT | Performed by: STUDENT IN AN ORGANIZED HEALTH CARE EDUCATION/TRAINING PROGRAM

## 2022-04-04 ENCOUNTER — OFFICE VISIT (OUTPATIENT)
Dept: INTERNAL MEDICINE | Facility: CLINIC | Age: 67
End: 2022-04-04

## 2022-04-04 ENCOUNTER — TELEPHONE (OUTPATIENT)
Dept: CARDIOLOGY | Facility: HOSPITAL | Age: 67
End: 2022-04-04

## 2022-04-04 VITALS
OXYGEN SATURATION: 98 % | RESPIRATION RATE: 16 BRPM | DIASTOLIC BLOOD PRESSURE: 70 MMHG | TEMPERATURE: 97.3 F | SYSTOLIC BLOOD PRESSURE: 118 MMHG | BODY MASS INDEX: 26.24 KG/M2 | WEIGHT: 125 LBS | HEART RATE: 63 BPM | HEIGHT: 58 IN

## 2022-04-04 DIAGNOSIS — I10 ESSENTIAL HYPERTENSION: Primary | ICD-10-CM

## 2022-04-04 DIAGNOSIS — E78.2 MIXED HYPERLIPIDEMIA: ICD-10-CM

## 2022-04-04 DIAGNOSIS — Z12.31 SCREENING MAMMOGRAM FOR BREAST CANCER: ICD-10-CM

## 2022-04-04 DIAGNOSIS — N20.0 RENAL CALCULI: ICD-10-CM

## 2022-04-04 DIAGNOSIS — R73.09 ABNORMAL GLUCOSE: ICD-10-CM

## 2022-04-04 DIAGNOSIS — Z23 NEED FOR PNEUMOCOCCAL VACCINATION: ICD-10-CM

## 2022-04-04 DIAGNOSIS — K86.2 PANCREATIC CYST: ICD-10-CM

## 2022-04-04 LAB
EXPIRATION DATE: NORMAL
HBA1C MFR BLD: 5.2 %
Lab: NORMAL

## 2022-04-04 PROCEDURE — G0009 ADMIN PNEUMOCOCCAL VACCINE: HCPCS | Performed by: NURSE PRACTITIONER

## 2022-04-04 PROCEDURE — 83036 HEMOGLOBIN GLYCOSYLATED A1C: CPT | Performed by: NURSE PRACTITIONER

## 2022-04-04 PROCEDURE — 3044F HG A1C LEVEL LT 7.0%: CPT | Performed by: NURSE PRACTITIONER

## 2022-04-04 PROCEDURE — 90732 PPSV23 VACC 2 YRS+ SUBQ/IM: CPT | Performed by: NURSE PRACTITIONER

## 2022-04-04 PROCEDURE — 99214 OFFICE O/P EST MOD 30 MIN: CPT | Performed by: NURSE PRACTITIONER

## 2022-04-04 RX ORDER — BENAZEPRIL HYDROCHLORIDE 40 MG/1
40 TABLET, FILM COATED ORAL DAILY
Qty: 90 TABLET | Refills: 1 | Status: SHIPPED | OUTPATIENT
Start: 2022-04-04 | End: 2022-10-18

## 2022-04-04 RX ORDER — NIFEDIPINE 60 MG/1
60 TABLET, FILM COATED, EXTENDED RELEASE ORAL DAILY
Qty: 90 TABLET | Refills: 3 | Status: SHIPPED | OUTPATIENT
Start: 2022-04-04

## 2022-04-04 RX ORDER — SIMVASTATIN 40 MG
40 TABLET ORAL NIGHTLY
Qty: 90 TABLET | Refills: 1 | Status: SHIPPED | OUTPATIENT
Start: 2022-04-04 | End: 2022-10-18

## 2022-04-04 NOTE — PROGRESS NOTES
Subjective   Greta Marino is a 66 y.o. female    Chief Complaint   Patient presents with   • Hyperlipidemia     6 month f/u   • Hypertension     6 month f/u   • abnormal glucose     Labs done 4/1/2022 and was fasting and glucose was 101     History of Present Illness     HTN - has been consistently taking BP meds (nifedipine and benazepril) as directed since last visit.  BP looks great today.  Denies any medication SE's     HL - chronic; currently taking Simvastatin 40 mg daily.  Denies med SE's.   Lab Results   Component Value Date    CHOL 149 04/01/2022    CHLPL 123 03/24/2021    TRIG 100 04/01/2022    HDL 36 (L) 04/01/2022    LDL 94 04/01/2022     Was referred to Cardiology after echo showing severe aortic stenosis.  She was seen by Cardiology and a consult with CT surgery was recommended.  She states that within the past 4 months she has noted increased DUNCAN and has seen CT surgery and is undergoing a work up for a TAVR.      Had recent CT angio for TAVR work up showing below:  IMPRESSION:  1. CTA of the chest, abdomen and pelvis with image data saved per TAVR  protocol.  2. Dense aortic valve calcification. No evidence of significant aortic  ectasia, sclerosis, dissection or advanced atherosclerotic disease.  3. Multinodular goiter.  4. Borderline dilatation of the main pancreatic duct and several  pancreatic cysts, which may be incidental, but any of which could  potentially represent an IPMN. Consider evaluation by GI service, when  convenient.  5. Bilateral nonobstructing renal calculi, right greater than left. Also  markedly dilated right renal collecting system and renal pelvis,  apparently chronic, and reflecting right UPJ stenosis whether acquired  or congenital stricture. No evidence of underlying obstructing calculus  or tumor. If this is not known from prior outside studies, consider  urology evaluation.  6. Focal irregular appearance of the 2nd-3rd portion the duodenum,  thought to be due to mass  effect from the markedly dilated right renal  pelvis. Please correlate with any GI symptoms.     I will refer to GI for further evaluation of pancreatic cysts and to Urology for evaluation of the renal calculi     COVID -3/31/2021, 4/23/2021, and 1/30/2022  Tdap - 2015  Flu shot -1/30/2022  Mamm - 11/2020, ordered  Colon- 8/2020, due in 2023    The following portions of the patient's history were reviewed and updated as appropriate: allergies, current medications, past family history, past medical history, past social history, past surgical history and problem list.    Current Outpatient Medications:   •  benazepril (LOTENSIN) 40 MG tablet, Take 1 tablet by mouth Daily., Disp: 90 tablet, Rfl: 1  •  Cholecalciferol (Vitamin D3) 10 MCG (400 UNIT) capsule, Take 4,000 Units by mouth Daily., Disp: , Rfl:   •  NIFEdipine CC (ADALAT CC) 60 MG 24 hr tablet, Take 1 tablet by mouth Daily., Disp: 90 tablet, Rfl: 3  •  simvastatin (ZOCOR) 40 MG tablet, Take 1 tablet by mouth Every Night., Disp: 90 tablet, Rfl: 1  •  vitamin B-12 (CYANOCOBALAMIN) 1000 MCG tablet, Take 1,000 mcg by mouth Daily., Disp: , Rfl:      Review of Systems   Constitutional: Positive for fatigue. Negative for chills and fever.   Respiratory: Positive for shortness of breath. Negative for cough and chest tightness.    Cardiovascular: Negative for chest pain.   Gastrointestinal: Negative for abdominal pain, diarrhea, nausea and vomiting.   Endocrine: Negative for cold intolerance and heat intolerance.   Musculoskeletal: Negative for arthralgias.   Neurological: Negative for dizziness.       Objective   Physical Exam  Constitutional:       Appearance: She is well-developed.   HENT:      Head: Normocephalic and atraumatic.   Eyes:      Conjunctiva/sclera: Conjunctivae normal.      Pupils: Pupils are equal, round, and reactive to light.   Cardiovascular:      Rate and Rhythm: Normal rate and regular rhythm.      Heart sounds: Murmur heard.    Systolic murmur is  "present with a grade of 4/6.  Pulmonary:      Effort: Pulmonary effort is normal.      Breath sounds: Normal breath sounds.   Abdominal:      General: Bowel sounds are normal.      Palpations: Abdomen is soft.   Musculoskeletal:         General: Normal range of motion.      Cervical back: Normal range of motion.   Skin:     General: Skin is warm and dry.   Neurological:      Mental Status: She is alert and oriented to person, place, and time.      Deep Tendon Reflexes: Reflexes are normal and symmetric.   Psychiatric:         Behavior: Behavior normal.         Thought Content: Thought content normal.         Judgment: Judgment normal.       Vitals:    04/04/22 1342   BP: 118/70   Cuff Size: Adult   Pulse: 63   Resp: 16   Temp: 97.3 °F (36.3 °C)   TempSrc: Infrared   SpO2: 98%   Weight: 56.7 kg (125 lb)   Height: 147.3 cm (57.99\")         Assessment/Plan   Diagnoses and all orders for this visit:    1. Essential hypertension (Primary)  -     NIFEdipine CC (ADALAT CC) 60 MG 24 hr tablet; Take 1 tablet by mouth Daily.  Dispense: 90 tablet; Refill: 3  -     benazepril (LOTENSIN) 40 MG tablet; Take 1 tablet by mouth Daily.  Dispense: 90 tablet; Refill: 1    2. Mixed hyperlipidemia  -     NIFEdipine CC (ADALAT CC) 60 MG 24 hr tablet; Take 1 tablet by mouth Daily.  Dispense: 90 tablet; Refill: 3  -     simvastatin (ZOCOR) 40 MG tablet; Take 1 tablet by mouth Every Night.  Dispense: 90 tablet; Refill: 1  -     benazepril (LOTENSIN) 40 MG tablet; Take 1 tablet by mouth Daily.  Dispense: 90 tablet; Refill: 1    3. Abnormal glucose  -     POC Glycosylated Hemoglobin (Hb A1C)    4. Screening mammogram for breast cancer  -     Mammo Screening Digital Tomosynthesis Bilateral With CAD; Future    5. Need for pneumococcal vaccination  -     Pneumococcal Polysaccharide Vaccine 23-Valent Greater Than or Equal To 3yo Subcutaneous / IM    6. Pancreatic cyst  -     Ambulatory Referral to Gastroenterology    7. Renal calculi  -     " Ambulatory Referral to Urology      Labs reviewed  No medication changes  Meds refilled  Mammogram ordered  Pneumovax updated  Referred to gastro  Referred to urology  Counseling-diet and exercise  Return for Next scheduled follow up.

## 2022-04-04 NOTE — TELEPHONE ENCOUNTER
TAVR LIBBY    LM with patient re: review CTA chest, abd, pelvis as well as inability for Cardiology  to set up cath/alexia.      Jaquelin SOUZA    Attempted call to patient again 4/5/22 @ 2:36 pm:  LM to say PCP referrals to GI and Urology noted.  Please contact Dr. Redd's  for cath/alexia [phone].  After that is completed, will re-schedule with Dr. Mckay then schedule the TAVR procedure.      Jaquelin SOUZA

## 2022-04-26 ENCOUNTER — OFFICE VISIT (OUTPATIENT)
Dept: INTERNAL MEDICINE | Facility: CLINIC | Age: 67
End: 2022-04-26

## 2022-04-26 VITALS
BODY MASS INDEX: 24.35 KG/M2 | SYSTOLIC BLOOD PRESSURE: 124 MMHG | DIASTOLIC BLOOD PRESSURE: 72 MMHG | WEIGHT: 120.8 LBS | TEMPERATURE: 97.5 F | HEIGHT: 59 IN | OXYGEN SATURATION: 100 % | HEART RATE: 79 BPM

## 2022-04-26 DIAGNOSIS — R31.9 HEMATURIA, UNSPECIFIED TYPE: ICD-10-CM

## 2022-04-26 DIAGNOSIS — Z00.00 WELCOME TO MEDICARE PREVENTIVE VISIT: ICD-10-CM

## 2022-04-26 DIAGNOSIS — M54.50 RIGHT-SIDED LOW BACK PAIN WITHOUT SCIATICA, UNSPECIFIED CHRONICITY: ICD-10-CM

## 2022-04-26 DIAGNOSIS — N20.0 RENAL CALCULI: ICD-10-CM

## 2022-04-26 DIAGNOSIS — Z00.00 ENCOUNTER FOR ANNUAL PHYSICAL EXAM: Primary | ICD-10-CM

## 2022-04-26 LAB
BILIRUB BLD-MCNC: NEGATIVE MG/DL
CLARITY, POC: CLEAR
COLOR UR: YELLOW
EXPIRATION DATE: ABNORMAL
GLUCOSE UR STRIP-MCNC: NEGATIVE MG/DL
KETONES UR QL: NEGATIVE
LEUKOCYTE EST, POC: ABNORMAL
Lab: ABNORMAL
NITRITE UR-MCNC: NEGATIVE MG/ML
PH UR: 5 [PH] (ref 5–8)
PROT UR STRIP-MCNC: NEGATIVE MG/DL
RBC # UR STRIP: ABNORMAL /UL
SP GR UR: 1.02 (ref 1–1.03)
UROBILINOGEN UR QL: NORMAL

## 2022-04-26 PROCEDURE — 1126F AMNT PAIN NOTED NONE PRSNT: CPT | Performed by: NURSE PRACTITIONER

## 2022-04-26 PROCEDURE — 1160F RVW MEDS BY RX/DR IN RCRD: CPT | Performed by: NURSE PRACTITIONER

## 2022-04-26 PROCEDURE — 81003 URINALYSIS AUTO W/O SCOPE: CPT | Performed by: NURSE PRACTITIONER

## 2022-04-26 PROCEDURE — G0438 PPPS, INITIAL VISIT: HCPCS | Performed by: NURSE PRACTITIONER

## 2022-04-26 PROCEDURE — 1170F FXNL STATUS ASSESSED: CPT | Performed by: NURSE PRACTITIONER

## 2022-04-26 NOTE — PROGRESS NOTES
The ABCs of the Annual Wellness Visit  Welcome to Medicare Visit    Chief Complaint   Patient presents with   • Medicare Wellness-Initial Visit     Wellness   • Annual Exam     physical   • Back Pain     Has had off and on back pain.  Sometimes it hurts to the point it makes her vomit.     Subjective {   History of Present Illness:  Greta Marino is a 67 y.o. female who presents for a  Welcome to Medicare Visit.    The following portions of the patient's history were reviewed and   updated as appropriate: allergies, current medications, past family history, past medical history, past social history, past surgical history and problem list.     Compared to one year ago, the patient feels her physical   health is worse.    Compared to one year ago, the patient feels her mental   health is worse.    Recent Hospitalizations:  She was not admitted to the hospital during the last year.       Current Medical Providers:  Patient Care Team:  Alia Vasquez APRN as PCP - General (Family Medicine)  Jaylan Mckay MD as Surgeon (Cardiothoracic Surgery)  Jaylan Redd MD as Consulting Physician (Cardiology)  Jaquelin Dominique APRN as Nurse Practitioner (Cardiology)  Steven Bowling MD as Consulting Physician (Gastroenterology)    Outpatient Medications Prior to Visit   Medication Sig Dispense Refill   • benazepril (LOTENSIN) 40 MG tablet Take 1 tablet by mouth Daily. 90 tablet 1   • Cholecalciferol (Vitamin D3) 10 MCG (400 UNIT) capsule Take 4,000 Units by mouth Daily.     • NIFEdipine CC (ADALAT CC) 60 MG 24 hr tablet Take 1 tablet by mouth Daily. 90 tablet 3   • simvastatin (ZOCOR) 40 MG tablet Take 1 tablet by mouth Every Night. 90 tablet 1   • vitamin B-12 (CYANOCOBALAMIN) 1000 MCG tablet Take 1,000 mcg by mouth Daily.       No facility-administered medications prior to visit.       No opioid medication identified on active medication list. I have reviewed chart for other potential  high risk medication/s  "and harmful drug interactions in the elderly.          Aspirin is not on active medication list.  Aspirin use is not indicated based on review of current medical condition/s. Risk of harm outweighs potential benefits.  .    Patient Active Problem List   Diagnosis   • Essential hypertension   • Mixed hyperlipidemia   • Arthritis   • Nonrheumatic aortic valve stenosis   • Aortic valve disorder     Advance Care Planning  Advance Directive is not on file.  ACP discussion was held with the patient during this visit. Patient does not have an advance directive, information provided.    Review of Systems   Musculoskeletal: Positive for back pain.   All other systems reviewed and are negative.    Pt c/o right sided back pain.  Has known kidney stones on the right.  Was referred to Urology but has not scheduled appt.  She hs been contacted 3 times.         Objective      Vitals:    04/26/22 1354   BP: 124/72   BP Location: Right arm   Patient Position: Sitting   Pulse: 79   Temp: 97.5 °F (36.4 °C)   TempSrc: Infrared   SpO2: 100%   Weight: 54.8 kg (120 lb 12.8 oz)   Height: 148.9 cm (58.62\")   PainSc: 0-No pain     BMI Readings from Last 1 Encounters:   04/26/22 24.72 kg/m²   BMI is within normal parameters. No follow-up required.    Does the patient have evidence of cognitive impairment? No    Physical Exam  Constitutional:       Appearance: She is well-developed.   HENT:      Head: Normocephalic and atraumatic.   Eyes:      Conjunctiva/sclera: Conjunctivae normal.      Pupils: Pupils are equal, round, and reactive to light.   Cardiovascular:      Rate and Rhythm: Normal rate and regular rhythm.      Heart sounds: Normal heart sounds.   Pulmonary:      Effort: Pulmonary effort is normal.      Breath sounds: Normal breath sounds.   Abdominal:      General: Bowel sounds are normal.      Palpations: Abdomen is soft.   Musculoskeletal:         General: Normal range of motion.      Cervical back: Normal range of motion.   Skin:   "   General: Skin is warm and dry.   Neurological:      Mental Status: She is alert and oriented to person, place, and time.      Deep Tendon Reflexes: Reflexes are normal and symmetric.   Psychiatric:         Behavior: Behavior normal.         Thought Content: Thought content normal.         Judgment: Judgment normal.         Lab Results   Component Value Date    TRIG 100 04/01/2022    HDL 36 (L) 04/01/2022    LDL 94 04/01/2022    VLDL 19 04/01/2022    HGBA1C 5.2 04/04/2022       Procedures       HEALTH RISK ASSESSMENT    Smoking Status:  Social History     Tobacco Use   Smoking Status Never Smoker   Smokeless Tobacco Never Used     Alcohol Consumption:  Social History     Substance and Sexual Activity   Alcohol Use Yes    Comment: on occasion       Fall Risk Screen:    DAVIS Fall Risk Assessment was completed, and patient is at LOW risk for falls.Assessment completed on:4/26/2022    Depression Screen:   PHQ-2/PHQ-9 Depression Screening 4/26/2022   Retired Total Score -   Little Interest or Pleasure in Doing Things 1-->several days   Feeling Down, Depressed or Hopeless 1-->several days   Trouble Falling or Staying Asleep, or Sleeping Too Much 3-->nearly every day   Feeling Tired or Having Little Energy 1-->several days   Poor Appetite or Overeating 1-->several days   Feeling Bad about Yourself - or that You are a Failure or Have Let Yourself or Your Family Down 1-->several days   Trouble Concentrating on Things, Such as Reading the Newspaper or Watching Television 0-->not at all   Moving or Speaking So Slowly that Other People Could Have Noticed? Or the Opposite - Being So Fidgety 0-->not at all   Thoughts that You Would be Better Off Dead or of Hurting Yourself in Some Way 0-->not at all   PHQ-9: Brief Depression Severity Measure Score 8   If You Checked Off Any Problems, How Difficult Have These Problems Made It For You to Do Your Work, Take Care of Things at Home, or Get Along with Other People? not difficult at  all       Health Habits and Functional and Cognitive Screening:  Functional & Cognitive Status 4/26/2022   Do you have difficulty preparing food and eating? No   Do you have difficulty bathing yourself, getting dressed or grooming yourself? No   Do you have difficulty using the toilet? No   Do you have difficulty moving around from place to place? No   Do you have trouble with steps or getting out of a bed or a chair? No   Current Diet Unhealthy Diet   Dental Exam Up to date   Eye Exam Not up to date   Exercise (times per week) 0 times per week   Current Exercises Include No Regular Exercise   Do you need help using the phone?  No   Are you deaf or do you have serious difficulty hearing?  No   Do you need help with transportation? No   Do you need help shopping? No   Do you need help preparing meals?  No   Do you need help with housework?  No   Do you need help with laundry? No   Do you need help taking your medications? No   Do you need help managing money? No   Do you ever drive or ride in a car without wearing a seat belt? No   Have you felt unusual stress, anger or loneliness in the last month? No   Who do you live with? Child   If you need help, do you have trouble finding someone available to you? No   Have you been bothered in the last four weeks by sexual problems? No       Visual Acuity:    No exam data present    Age-appropriate Screening Schedule:  Refer to the list below for future screening recommendations based on patient's age, sex and/or medical conditions. Orders for these recommended tests are listed in the plan section. The patient has been provided with a written plan.    Health Maintenance   Topic Date Due   • DXA SCAN  Never done   • ZOSTER VACCINE (1 of 2) Never done   • MAMMOGRAM  11/18/2021   • INFLUENZA VACCINE  08/01/2022   • LIPID PANEL  04/01/2023   • TDAP/TD VACCINES (2 - Td or Tdap) 01/01/2025          Assessment/Plan   CMS Preventative Services Quick Reference  Risk Factors Identified  During Encounter  Cardiovascular Disease - valvular disease  The above risks/problems have been discussed with the patient.  Pertinent information has been shared with the patient in the After Visit Summary.  Follow up plans and orders are seen below in the Assessment/Plan Section.    COVID -3/31/2021, 4/23/2021, and 1/30/2022  Tdap - 2015  Flu shot -1/30/2022  Mamm - 11/2020, scheduled for 6/2/2022  Colon- 8/2020, due in 2023       Diagnoses and all orders for this visit:    1. Encounter for annual physical exam (Primary)  -     POC Urinalysis Dipstick, Automated    2. Welcome to Medicare preventive visit    3. Right-sided low back pain without sciatica, unspecified chronicity    4. Hematuria, unspecified type    5. Renal calculi      Pt provided with # to call Urology and schedule appt ASAP    Counseling - diet and exercise    Follow Up:   Return in about 6 months (around 10/26/2022) for f/u.     An After Visit Summary and PPPS were made available to the patient.

## 2022-04-28 ENCOUNTER — TELEPHONE (OUTPATIENT)
Dept: CARDIOLOGY | Facility: HOSPITAL | Age: 67
End: 2022-04-28

## 2022-04-28 NOTE — TELEPHONE ENCOUNTER
TAVR LIBBY    Cardiology scheduling has been unable to reach Ms. Marino for cath/alexia.  Patient has cancelled her visit with Dr. Mckay 4/4/22.      Attempted to reach patient by phone regarding unfinished TAVR pre-op testing and TAVR scheduling.  Will place patient into TAVR hold list until further contact is made.    Jaquelin SOUZA

## 2022-05-24 ENCOUNTER — TELEPHONE (OUTPATIENT)
Dept: CARDIOLOGY | Facility: HOSPITAL | Age: 67
End: 2022-05-24

## 2022-05-24 NOTE — TELEPHONE ENCOUNTER
SHANTA SOUZA    Spoke with MsEdilma Jamila by phone today.  She states she has her urology consult scheduled for June ( large renal calculi) and GI for July (pancreatic cyst).  She would like to go ahead and schedule heart cath/ ALEXIA in June and then see Dr. Mckay in regards to TAVR once she has seen GI and Urology.    Sent message to Cardiology scheduling and will see patient when she is on campus for Cath/alexia to make further TAVR scheduling plans.    Jaquelin SOUZA

## 2022-06-02 ENCOUNTER — HOSPITAL ENCOUNTER (OUTPATIENT)
Dept: MAMMOGRAPHY | Facility: HOSPITAL | Age: 67
Discharge: HOME | End: 2022-06-02
Admitting: NURSE PRACTITIONER

## 2022-06-02 DIAGNOSIS — Z12.31 SCREENING MAMMOGRAM FOR BREAST CANCER: ICD-10-CM

## 2022-06-02 PROCEDURE — 77063 BREAST TOMOSYNTHESIS BI: CPT | Performed by: RADIOLOGY

## 2022-06-02 PROCEDURE — 77067 SCR MAMMO BI INCL CAD: CPT

## 2022-06-02 PROCEDURE — 77067 SCR MAMMO BI INCL CAD: CPT | Performed by: RADIOLOGY

## 2022-06-02 PROCEDURE — 77063 BREAST TOMOSYNTHESIS BI: CPT

## 2022-06-23 DIAGNOSIS — I35.0 NONRHEUMATIC AORTIC VALVE STENOSIS: Primary | ICD-10-CM

## 2022-06-27 ENCOUNTER — APPOINTMENT (OUTPATIENT)
Dept: PREADMISSION TESTING | Facility: HOSPITAL | Age: 67
End: 2022-06-27

## 2022-06-27 LAB — SARS-COV-2 RNA PNL SPEC NAA+PROBE: NOT DETECTED

## 2022-06-27 PROCEDURE — C9803 HOPD COVID-19 SPEC COLLECT: HCPCS

## 2022-06-27 PROCEDURE — U0004 COV-19 TEST NON-CDC HGH THRU: HCPCS

## 2022-06-28 ENCOUNTER — TELEPHONE (OUTPATIENT)
Dept: CARDIAC SURGERY | Facility: CLINIC | Age: 67
End: 2022-06-28

## 2022-06-28 ENCOUNTER — OFFICE VISIT (OUTPATIENT)
Dept: UROLOGY | Facility: CLINIC | Age: 67
End: 2022-06-28

## 2022-06-28 VITALS — HEIGHT: 59 IN | BODY MASS INDEX: 24.19 KG/M2 | WEIGHT: 120 LBS

## 2022-06-28 DIAGNOSIS — N13.5 URETEROPELVIC JUNCTION (UPJ) OBSTRUCTION: Primary | ICD-10-CM

## 2022-06-28 PROCEDURE — 99204 OFFICE O/P NEW MOD 45 MIN: CPT | Performed by: UROLOGY

## 2022-06-28 NOTE — TELEPHONE ENCOUNTER
Caller: Greta Marino    Relationship: Self    Best call back number: [phone]    What is the best time to reach you: ANYTIME AFTER 3 TODAY    Who are you requesting to speak with (clinical staff, provider,  specific staff member): DR. JESUS'S TEAM    Do you know the name of the person who called:     What was the call regarding: PT HAS A CATH PROCEDURE ON 6.30.22 F/U W/ ALICE ON 7.11.22 AND SURGERY W/ WADE ON 7.28.22 - PT IS CONCERNED ABOUT HAVING A CATH IN FOR THAT PERIOD OF TIME FROM PROCEDURE TO SURGERY     Do you require a callback: YES

## 2022-06-30 ENCOUNTER — HOSPITAL ENCOUNTER (OUTPATIENT)
Facility: HOSPITAL | Age: 67
Setting detail: HOSPITAL OUTPATIENT SURGERY
Discharge: HOME | End: 2022-06-30
Attending: INTERNAL MEDICINE | Admitting: INTERNAL MEDICINE
Payer: MEDICARE

## 2022-06-30 ENCOUNTER — HOSPITAL ENCOUNTER (OUTPATIENT)
Dept: CARDIOLOGY | Facility: HOSPITAL | Age: 67
Discharge: HOME | End: 2022-06-30
Payer: MEDICARE

## 2022-06-30 VITALS
WEIGHT: 121.25 LBS | HEART RATE: 54 BPM | SYSTOLIC BLOOD PRESSURE: 141 MMHG | OXYGEN SATURATION: 97 % | TEMPERATURE: 97.3 F | RESPIRATION RATE: 16 BRPM | HEIGHT: 58 IN | DIASTOLIC BLOOD PRESSURE: 71 MMHG | BODY MASS INDEX: 25.45 KG/M2

## 2022-06-30 VITALS — WEIGHT: 120 LBS | HEIGHT: 59 IN | BODY MASS INDEX: 24.19 KG/M2

## 2022-06-30 DIAGNOSIS — I35.9 AORTIC VALVE DISORDER: Primary | ICD-10-CM

## 2022-06-30 DIAGNOSIS — I35.0 NONRHEUMATIC AORTIC VALVE STENOSIS: ICD-10-CM

## 2022-06-30 LAB
ALBUMIN SERPL-MCNC: 4.5 G/DL (ref 3.5–5.2)
ALBUMIN/GLOB SERPL: 2 G/DL
ALP SERPL-CCNC: 86 U/L (ref 39–117)
ALT SERPL W P-5'-P-CCNC: 9 U/L (ref 1–33)
ANION GAP SERPL CALCULATED.3IONS-SCNC: 10 MMOL/L (ref 5–15)
AORTIC ROOT ANNULUS: 2.3 CM
AST SERPL-CCNC: 16 U/L (ref 1–32)
BH CV ECHO MEAS - AO MAX PG: 70.9 MMHG
BH CV ECHO MEAS - AO MEAN PG: 31 MMHG
BH CV ECHO MEAS - AO V2 MAX: 421 CM/SEC
BH CV ECHO MEAS - AO V2 VTI: 67.1 CM
BH CV VAS BP LEFT ARM: NORMAL MMHG
BILIRUB SERPL-MCNC: 0.5 MG/DL (ref 0–1.2)
BUN SERPL-MCNC: 20 MG/DL (ref 8–23)
BUN/CREAT SERPL: 25.3 (ref 7–25)
CALCIUM SPEC-SCNC: 9.5 MG/DL (ref 8.6–10.5)
CHLORIDE SERPL-SCNC: 104 MMOL/L (ref 98–107)
CHOLEST SERPL-MCNC: 126 MG/DL (ref 0–200)
CO2 SERPL-SCNC: 25 MMOL/L (ref 22–29)
CREAT SERPL-MCNC: 0.79 MG/DL (ref 0.57–1)
DEPRECATED RDW RBC AUTO: 41.3 FL (ref 37–54)
EGFRCR SERPLBLD CKD-EPI 2021: 82.6 ML/MIN/1.73
ERYTHROCYTE [DISTWIDTH] IN BLOOD BY AUTOMATED COUNT: 11.9 % (ref 12.3–15.4)
GLOBULIN UR ELPH-MCNC: 2.3 GM/DL
GLUCOSE SERPL-MCNC: 103 MG/DL (ref 65–99)
HBA1C MFR BLD: 4.9 % (ref 4.8–5.6)
HCT VFR BLD AUTO: 37.6 % (ref 34–46.6)
HDLC SERPL-MCNC: 40 MG/DL (ref 40–60)
HGB BLD-MCNC: 12.9 G/DL (ref 12–15.9)
LDLC SERPL CALC-MCNC: 70 MG/DL (ref 0–100)
LDLC/HDLC SERPL: 1.75 {RATIO}
LV EF 2D ECHO EST: 60 %
MAXIMAL PREDICTED HEART RATE: 154 BPM
MCH RBC QN AUTO: 32.4 PG (ref 26.6–33)
MCHC RBC AUTO-ENTMCNC: 34.3 G/DL (ref 31.5–35.7)
MCV RBC AUTO: 94.5 FL (ref 79–97)
PLATELET # BLD AUTO: 220 10*3/MM3 (ref 140–450)
PMV BLD AUTO: 9.6 FL (ref 6–12)
POTASSIUM SERPL-SCNC: 4.1 MMOL/L (ref 3.5–5.2)
PROT SERPL-MCNC: 6.8 G/DL (ref 6–8.5)
RBC # BLD AUTO: 3.98 10*6/MM3 (ref 3.77–5.28)
SODIUM SERPL-SCNC: 139 MMOL/L (ref 136–145)
STJ: 2.5 CM
STRESS TARGET HR: 131 BPM
TRIGL SERPL-MCNC: 80 MG/DL (ref 0–150)
VLDLC SERPL-MCNC: 16 MG/DL (ref 5–40)
WBC NRBC COR # BLD: 5.24 10*3/MM3 (ref 3.4–10.8)

## 2022-06-30 PROCEDURE — 25010000002 FENTANYL CITRATE (PF) 50 MCG/ML SOLUTION: Performed by: INTERNAL MEDICINE

## 2022-06-30 PROCEDURE — 0 IOPAMIDOL PER 1 ML: Performed by: INTERNAL MEDICINE

## 2022-06-30 PROCEDURE — 80053 COMPREHEN METABOLIC PANEL: CPT | Performed by: NURSE PRACTITIONER

## 2022-06-30 PROCEDURE — C1769 GUIDE WIRE: HCPCS | Performed by: INTERNAL MEDICINE

## 2022-06-30 PROCEDURE — 93454 CORONARY ARTERY ANGIO S&I: CPT | Performed by: INTERNAL MEDICINE

## 2022-06-30 PROCEDURE — 25010000002 MIDAZOLAM PER 1 MG: Performed by: INTERNAL MEDICINE

## 2022-06-30 PROCEDURE — 93571 IV DOP VEL&/PRESS C FLO 1ST: CPT | Performed by: INTERNAL MEDICINE

## 2022-06-30 PROCEDURE — 99153 MOD SED SAME PHYS/QHP EA: CPT | Performed by: INTERNAL MEDICINE

## 2022-06-30 PROCEDURE — 99152 MOD SED SAME PHYS/QHP 5/>YRS: CPT | Performed by: INTERNAL MEDICINE

## 2022-06-30 PROCEDURE — 93325 DOPPLER ECHO COLOR FLOW MAPG: CPT

## 2022-06-30 PROCEDURE — 25010000002 HEPARIN (PORCINE) PER 1000 UNITS: Performed by: INTERNAL MEDICINE

## 2022-06-30 PROCEDURE — 85027 COMPLETE CBC AUTOMATED: CPT | Performed by: NURSE PRACTITIONER

## 2022-06-30 PROCEDURE — C1887 CATHETER, GUIDING: HCPCS | Performed by: INTERNAL MEDICINE

## 2022-06-30 PROCEDURE — 83036 HEMOGLOBIN GLYCOSYLATED A1C: CPT | Performed by: NURSE PRACTITIONER

## 2022-06-30 PROCEDURE — 93312 ECHO TRANSESOPHAGEAL: CPT

## 2022-06-30 PROCEDURE — C1894 INTRO/SHEATH, NON-LASER: HCPCS | Performed by: INTERNAL MEDICINE

## 2022-06-30 PROCEDURE — 93321 DOPPLER ECHO F-UP/LMTD STD: CPT

## 2022-06-30 PROCEDURE — 93321 DOPPLER ECHO F-UP/LMTD STD: CPT | Performed by: INTERNAL MEDICINE

## 2022-06-30 PROCEDURE — 93325 DOPPLER ECHO COLOR FLOW MAPG: CPT | Performed by: INTERNAL MEDICINE

## 2022-06-30 PROCEDURE — 93312 ECHO TRANSESOPHAGEAL: CPT | Performed by: INTERNAL MEDICINE

## 2022-06-30 PROCEDURE — 80061 LIPID PANEL: CPT | Performed by: NURSE PRACTITIONER

## 2022-06-30 PROCEDURE — 36415 COLL VENOUS BLD VENIPUNCTURE: CPT

## 2022-06-30 RX ORDER — FLUMAZENIL 0.1 MG/ML
INJECTION INTRAVENOUS
Status: DISCONTINUED
Start: 2022-06-30 | End: 2022-06-30 | Stop reason: WASHOUT

## 2022-06-30 RX ORDER — SODIUM CHLORIDE 0.9 % (FLUSH) 0.9 %
10 SYRINGE (ML) INJECTION EVERY 12 HOURS SCHEDULED
Status: DISCONTINUED | OUTPATIENT
Start: 2022-06-30 | End: 2022-06-30 | Stop reason: HOSPADM

## 2022-06-30 RX ORDER — SODIUM CHLORIDE 0.9 % (FLUSH) 0.9 %
1-10 SYRINGE (ML) INJECTION AS NEEDED
Status: DISCONTINUED | OUTPATIENT
Start: 2022-06-30 | End: 2022-06-30 | Stop reason: HOSPADM

## 2022-06-30 RX ORDER — ASPIRIN 325 MG
325 TABLET, DELAYED RELEASE (ENTERIC COATED) ORAL DAILY
Status: DISCONTINUED | OUTPATIENT
Start: 2022-07-01 | End: 2022-06-30 | Stop reason: HOSPADM

## 2022-06-30 RX ORDER — LIDOCAINE HYDROCHLORIDE 10 MG/ML
INJECTION, SOLUTION EPIDURAL; INFILTRATION; INTRACAUDAL; PERINEURAL AS NEEDED
Status: DISCONTINUED | OUTPATIENT
Start: 2022-06-30 | End: 2022-06-30 | Stop reason: HOSPADM

## 2022-06-30 RX ORDER — ONDANSETRON 2 MG/ML
4 INJECTION INTRAMUSCULAR; INTRAVENOUS EVERY 6 HOURS PRN
Status: DISCONTINUED | OUTPATIENT
Start: 2022-06-30 | End: 2022-06-30 | Stop reason: HOSPADM

## 2022-06-30 RX ORDER — HEPARIN SODIUM 1000 [USP'U]/ML
INJECTION, SOLUTION INTRAVENOUS; SUBCUTANEOUS AS NEEDED
Status: DISCONTINUED | OUTPATIENT
Start: 2022-06-30 | End: 2022-06-30 | Stop reason: HOSPADM

## 2022-06-30 RX ORDER — ASPIRIN 325 MG
325 TABLET ORAL ONCE
Status: COMPLETED | OUTPATIENT
Start: 2022-06-30 | End: 2022-06-30

## 2022-06-30 RX ORDER — NITROGLYCERIN 0.4 MG/1
0.4 TABLET SUBLINGUAL
Status: DISCONTINUED | OUTPATIENT
Start: 2022-06-30 | End: 2022-06-30 | Stop reason: HOSPADM

## 2022-06-30 RX ORDER — MIDAZOLAM HYDROCHLORIDE 1 MG/ML
INJECTION INTRAMUSCULAR; INTRAVENOUS AS NEEDED
Status: DISCONTINUED | OUTPATIENT
Start: 2022-06-30 | End: 2022-06-30 | Stop reason: HOSPADM

## 2022-06-30 RX ORDER — MIDAZOLAM HYDROCHLORIDE 1 MG/ML
INJECTION INTRAMUSCULAR; INTRAVENOUS
Status: DISCONTINUED
Start: 2022-06-30 | End: 2022-06-30 | Stop reason: HOSPADM

## 2022-06-30 RX ORDER — MIDAZOLAM HYDROCHLORIDE 1 MG/ML
INJECTION INTRAMUSCULAR; INTRAVENOUS
Status: COMPLETED | OUTPATIENT
Start: 2022-06-30 | End: 2022-06-30

## 2022-06-30 RX ORDER — ACETAMINOPHEN 325 MG/1
650 TABLET ORAL EVERY 4 HOURS PRN
Status: DISCONTINUED | OUTPATIENT
Start: 2022-06-30 | End: 2022-06-30 | Stop reason: HOSPADM

## 2022-06-30 RX ORDER — FENTANYL CITRATE 50 UG/ML
INJECTION, SOLUTION INTRAMUSCULAR; INTRAVENOUS AS NEEDED
Status: DISCONTINUED | OUTPATIENT
Start: 2022-06-30 | End: 2022-06-30 | Stop reason: HOSPADM

## 2022-06-30 RX ADMIN — ASPIRIN 325 MG ORAL TABLET 325 MG: 325 PILL ORAL at 10:32

## 2022-06-30 RX ADMIN — MIDAZOLAM 2 MG: 1 INJECTION INTRAMUSCULAR; INTRAVENOUS at 11:08

## 2022-06-30 NOTE — Clinical Note
Hemostasis started on the right radial artery. R-Band was used in achieving hemostasis. Radial compression device applied to vessel. Hemostasis achieved successfully.

## 2022-06-30 NOTE — H&P
[facility] at [facility]   History and physical      Patient Care Team:  Alia Vasquez APRN as PCP - General (Family Medicine)  Jaylan Mckay MD as Surgeon (Cardiothoracic Surgery)  Jaylan Redd MD as Consulting Physician (Cardiology)  Jaquelin Dominique APRN as Nurse Practitioner (Cardiology)  Steven Bowling MD as Consulting Physician (Gastroenterology)  Shahid Lopez MD as Consulting Physician (Urology)      PROBLEM LIST:  1. Aortic stenosis   a. Echocardiogram, 5/11/2016: EF 70%. LV has mild concentric hypertrophy. AV was trileaflet. Moderate stenosis mean 20-25 mmHg, max 45-50 mmHg. Moderate AR, MR, and TR. LA mildly dilated.   b. Echocardiogram, 8/17/2020: LVEF 66%. Moderate AI and severe AS with mean gradient 42 mmHg.  Mild MR and TR.  RVSP 23  mmHg. Grade 1 diastolic dysfunction\  c. Echocardiogram, 4/26/2021: EF 60%. Severe aortic valve stenosis is present. Aortic valve area is 0.8 cm2. AV max pressure gradient 76 mmHg, mean 39 mmHg. Mild to moderate AR. Mild MR.  2. Hypertension  3. Hyperlipidemia.   4. Arthritis  5. Asthma  6. Nephrolithiasis   7. Surgeries:  a. Ankle surgery, left  b. Breast biopsy  c. Knee surgery, right        Allergies   Allergen Reactions   • Peanut-Containing Drug Products Hives           Current Facility-Administered Medications:   •  [COMPLETED] aspirin tablet 325 mg, 325 mg, Oral, Once, 325 mg at 06/30/22 1032 **AND** [START ON 7/1/2022] aspirin EC tablet 325 mg, 325 mg, Oral, Daily, Quiqueer Karma, APRN  •  nitroglycerin (NITROSTAT) SL tablet 0.4 mg, 0.4 mg, Sublingual, Q5 Min PRN, Kalyan Karma, APRN  •  ondansetron (ZOFRAN) injection 4 mg, 4 mg, Intravenous, Q6H PRN, Quiqueer Karma, APRN  •  sodium chloride 0.9 % flush 1-10 mL, 1-10 mL, Intravenous, PRN, Quiqueer, Karma, APRN  •  sodium chloride 0.9 % flush 10 mL, 10 mL, Intravenous, Q12H, Clevinger, Karma, APRN         Medications Prior to Admission   Medication Sig Dispense  Refill Last Dose   • benazepril (LOTENSIN) 40 MG tablet Take 1 tablet by mouth Daily. 90 tablet 1 6/30/2022 at Unknown time   • Cholecalciferol (Vitamin D3) 10 MCG (400 UNIT) capsule Take 4,000 Units by mouth Daily.   Past Week at Unknown time   • NIFEdipine CC (ADALAT CC) 60 MG 24 hr tablet Take 1 tablet by mouth Daily. 90 tablet 3 6/30/2022 at Unknown time   • simvastatin (ZOCOR) 40 MG tablet Take 1 tablet by mouth Every Night. 90 tablet 1 6/29/2022 at Unknown time   • vitamin B-12 (CYANOCOBALAMIN) 1000 MCG tablet Take 1,000 mcg by mouth Daily.   Past Week at Unknown time         Subjective .   History of present illness:    Patient is a 67-year-old  female who we are seeing today for preoperative TAVR evaluation SOURAV and cardiac catheterization.  She was last seen in the office in November.  At that time she had noted severe aortic stenosis and was complaining of some mild increase in shortness of breath.  Due to the symptoms she was initiated and evaluation for TAVR.  She has since been seen by the TAVR clinic and underwent her CTA of her chest.  She is now here for SOURAV and cardiac catheterization.  Still notes some shortness of breath does not feel that it is overall changed.  Is hoping to undergo TAVR in July.  No reported syncope, near syncope.  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      Social History     Socioeconomic History   • Marital status:    • Number of children: 2   • Highest education level: High school graduate   Tobacco Use   • Smoking status: Never Smoker   • Smokeless tobacco: Never Used   Vaping Use   • Vaping Use: Never used   Substance and Sexual Activity   • Alcohol use: Yes     Comment: on occasion 1 glass per month   • Drug use: Never   • Sexual activity: Not Currently     Partners: Male     Birth control/protection: None     Comment:      Family History   Problem Relation Age of Onset   •  "Pancreatic cancer Mother         Passed away in 2002   • Arthritis Mother    • Cancer Mother         Pancreatic cancer   • Diabetes Father    • Heart attack Father         passed in 2000   • Hypertension Father    • Colon cancer Father    • Cancer Father         Colon cancer   • Diabetes Brother         Type 2   • Hypertension Brother    • Hypertension Brother    • Stroke Brother    • Sarcoidosis Daughter    • Breast cancer Neg Hx    • Ovarian cancer Neg Hx          Review of Systems:  Review of Systems   Constitutional: Positive for malaise/fatigue. Negative for fever.   HENT: Negative for nosebleeds.    Eyes: Negative for redness and visual disturbance.   Cardiovascular: Positive for dyspnea on exertion. Negative for orthopnea, palpitations and paroxysmal nocturnal dyspnea.   Respiratory: Negative for cough, snoring, sputum production and wheezing.    Hematologic/Lymphatic: Negative for bleeding problem.   Skin: Negative for flushing, itching and rash.   Musculoskeletal: Positive for arthritis. Negative for falls, joint pain and muscle cramps.   Gastrointestinal: Negative for abdominal pain, diarrhea, heartburn, nausea and vomiting.   Genitourinary: Negative for hematuria.   Neurological: Negative for excessive daytime sleepiness, dizziness, headaches, tremors and weakness.   Psychiatric/Behavioral: Negative for substance abuse. The patient is not nervous/anxious.               Objective   Vitals:  /65 (BP Location: Left arm, Patient Position: Lying)   Pulse 73   Temp 97.3 °F (36.3 °C) (Tympanic)   Resp 16   Ht 147.3 cm (58\")   Wt 55 kg (121 lb 4.1 oz)   SpO2 98%   BMI 25.34 kg/m²        Vitals reviewed.   Constitutional:       Appearance: Healthy appearance. Well-developed and not in distress.   Neck:      Vascular: No JVD.      Trachea: No tracheal deviation.   Pulmonary:      Effort: Pulmonary effort is normal.      Breath sounds: Normal breath sounds.   Cardiovascular:      Normal rate. Regular " rhythm.      Murmurs: There is a grade 3/6 harsh midsystolic murmur at the URSB, radiating to the neck.      Comments: Right Devon's positive  Pulses:     Intact distal pulses.   Edema:     Peripheral edema absent.   Abdominal:      General: Bowel sounds are normal.      Palpations: Abdomen is soft.      Tenderness: There is no abdominal tenderness.   Musculoskeletal:         General: No deformity. Skin:     General: Skin is warm and dry.   Neurological:      Mental Status: Alert and oriented to person, place, and time.              Results Review:  I reviewed the patient's new clinical results.  Results from last 7 days   Lab Units 06/30/22  1011   WBC 10*3/mm3 5.24   HEMOGLOBIN g/dL 12.9   HEMATOCRIT % 37.6   PLATELETS 10*3/mm3 220           Invalid input(s): LABALBU, PROT          No results found for: TROPONINT                    Assessment & Plan     1. Severe aortic stenosis, undergoing TAVR eval.  2. Hypertension  3. Dyslipidemia      Plan:    1. We will proceed to SOURAV with Dr. Camara and cardiac catheterization plus or minus catheter-based intervention with Dr. Redd today.  This was discussed with the patient and family.  They verbalized understanding and wished to proceed.  Further recommendations to follow procedures.        LIBBY Dukes   Dictated utilizing Dragon dictation

## 2022-06-30 NOTE — Clinical Note
Prepped: Right Wrist. Prepped with: ChloraPrep. The site was clipped. The patient was draped in a sterile fashion.

## 2022-07-11 ENCOUNTER — OFFICE VISIT (OUTPATIENT)
Dept: CARDIAC SURGERY | Facility: CLINIC | Age: 67
End: 2022-07-11

## 2022-07-11 VITALS
SYSTOLIC BLOOD PRESSURE: 122 MMHG | HEIGHT: 58 IN | OXYGEN SATURATION: 99 % | TEMPERATURE: 99.6 F | BODY MASS INDEX: 25.4 KG/M2 | DIASTOLIC BLOOD PRESSURE: 72 MMHG | WEIGHT: 121 LBS | HEART RATE: 76 BPM

## 2022-07-11 DIAGNOSIS — I35.0 NONRHEUMATIC AORTIC VALVE STENOSIS: Primary | ICD-10-CM

## 2022-07-11 PROCEDURE — 99213 OFFICE O/P EST LOW 20 MIN: CPT | Performed by: NURSE PRACTITIONER

## 2022-07-11 NOTE — PROGRESS NOTES
"     [facility] Cardioth[facility] Surgery Office Follow Up Note     Date of Encounter: 2022     Name: Greta Marino  : 1955     Referred By: No ref. provider found  PCP: Alia Vasquez APRN    Chief Complaint:    Chief Complaint   Patient presents with   • Aortic Aneurysm     Follow up to discuss TAVR surgery        Subjective      History of Present Illness:    Greta Marino is a 67 y.o. female non-smoker, with a history of hypertension, hyperlipidemia, and severe aortic valve stenosis.  Patient was last seen in clinic May 2021 by Dr. Mckay with recommendations to proceed with TAVR, but patient was relatively asymptomatic and decided to defer surgical intervention at that time.  Patient presents today in rereferral for evaluation for TAVR after updated cardiac cath and SOURAV. Patient reports over the last year, patient has had interval worsening DUNCAN, fatigue and episodes of intermittent \"heart fluttering.\" Patient denies any chest pain. She follows closely with Dr. Redd, her primary cardiologist. She has already been scheduled for her TAVR on  of this month with Dr. Mckay.  She was incidentally found to have bilateral nonobstructing nephrolithiasis, right greater than left on CTA TAVR.  She has since seen urology Dr. Lopez for significant hydronephrosis of the right renal collecting system consistent with possible right ureteropelvic junction obstruction with plans for nuclear medicine renogram to further evaluate her renal function, differential function, obstruction as she has maintained renal function at this time. If patient were to develop symptoms of flank pain or decreased/declining renal function, urology may consider cystoscopy, diagnostic ureteroscopy, and stent placement in the future.     Review of Systems:  Review of Systems   Constitutional: Negative for chills, decreased appetite, diaphoresis, fever, malaise/fatigue, night sweats, weight gain and weight loss.   HENT: " Negative for hoarse voice.    Eyes: Negative for blurred vision, double vision and visual disturbance.   Cardiovascular: Positive for dyspnea on exertion and palpitations. Negative for chest pain, claudication, irregular heartbeat, leg swelling, near-syncope, orthopnea, paroxysmal nocturnal dyspnea and syncope.   Respiratory: Negative for cough, hemoptysis, shortness of breath, sputum production and wheezing.    Hematologic/Lymphatic: Negative for adenopathy and bleeding problem. Does not bruise/bleed easily.   Skin: Negative for color change, nail changes, poor wound healing and rash.   Musculoskeletal: Negative for back pain, falls and muscle cramps.   Gastrointestinal: Negative for abdominal pain, dysphagia and heartburn.   Genitourinary: Negative for flank pain.   Neurological: Negative for brief paralysis, disturbances in coordination, dizziness, focal weakness, headaches, light-headedness, loss of balance, numbness, paresthesias, sensory change, vertigo and weakness.   Psychiatric/Behavioral: Negative for depression and suicidal ideas.   Allergic/Immunologic: Negative for persistent infections.       I have reviewed the following portions of the patient's history: allergies, current medications, past family history, past medical history, past social history, past surgical history, problem list and ROS and confirm it's accurate.    Allergies:  Allergies   Allergen Reactions   • Peanut-Containing Drug Products Hives       Medications:      Current Outpatient Medications:   •  benazepril (LOTENSIN) 40 MG tablet, Take 1 tablet by mouth Daily., Disp: 90 tablet, Rfl: 1  •  Cholecalciferol (Vitamin D3) 10 MCG (400 UNIT) capsule, Take 4,000 Units by mouth Daily., Disp: , Rfl:   •  NIFEdipine CC (ADALAT CC) 60 MG 24 hr tablet, Take 1 tablet by mouth Daily., Disp: 90 tablet, Rfl: 3  •  simvastatin (ZOCOR) 40 MG tablet, Take 1 tablet by mouth Every Night., Disp: 90 tablet, Rfl: 1  •  vitamin B-12 (CYANOCOBALAMIN) 1000  MCG tablet, Take 1,000 mcg by mouth Daily., Disp: , Rfl:     History:   Past Medical History:   Diagnosis Date   • Arthritis    • Asthma    • Coronary artery disease    • Heart murmur    • Heart valve disease    • History of mammography, screening 2017    Done in [place]    • History of Papanicolaou smear of cervix 2017    Done in [place]   • History of shingles 2015   • Hyperlipidemia    • Hypertension    • Kidney stones    • Pancreatic cyst        Past Surgical History:   Procedure Laterality Date   • ANKLE SURGERY Left 1980's   • BREAST BIOPSY Right 1995   • CARDIAC CATHETERIZATION Left 6/30/2022    Procedure: Left Heart Cath;  Surgeon: Jaylan Redd MD;  Location: [facility] CATH INVASIVE LOCATION;  Service: Cardiovascular;  Laterality: Left;  to be scheduled in next 1-2 months   • COLONOSCOPY  8/24/2020, 2005    8/24/2020- Dr. Bowling. 2005-Dr. Spence in [place], [place]   • FRACTURE SURGERY  1998   • KNEE SURGERY Right 12/20/2018    meniscus repair   • TRANSESOPHAGEAL ECHOCARDIOGRAM (SOURAV)         Social History     Socioeconomic History   • Marital status:    • Number of children: 2   • Highest education level: High school graduate   Tobacco Use   • Smoking status: Never Smoker   • Smokeless tobacco: Never Used   Vaping Use   • Vaping Use: Never used   Substance and Sexual Activity   • Alcohol use: Yes     Comment: on occasion 1 glass per month   • Drug use: Never   • Sexual activity: Not Currently     Partners: Male     Birth control/protection: None     Comment:         Family History   Problem Relation Age of Onset   • Pancreatic cancer Mother         Passed away in 2002   • Arthritis Mother    • Cancer Mother         Pancreatic cancer   • Diabetes Father    • Heart attack Father         passed in 2000   • Hypertension Father    • Colon cancer Father    • Cancer Father         Colon cancer   • Diabetes Brother         Type 2   • Hypertension Brother    • Hypertension Brother    • Stroke  "Brother    • Sarcoidosis Daughter    • Breast cancer Neg Hx    • Ovarian cancer Neg Hx        Objective     Physical Exam:  Vitals:    07/11/22 1144   BP: 122/72   BP Location: Right arm   Patient Position: Sitting   Pulse: 76   Temp: 99.6 °F (37.6 °C)   SpO2: 99%   Weight: 54.9 kg (121 lb)   Height: 147.3 cm (58\")      Body mass index is 25.29 kg/m².    Physical Exam  Vitals and nursing note reviewed.   Constitutional:       Appearance: Normal appearance. She is well-developed.   HENT:      Head: Normocephalic and atraumatic.   Eyes:      Pupils: Pupils are equal, round, and reactive to light.   Neck:      Vascular: No carotid bruit.   Cardiovascular:      Rate and Rhythm: Normal rate and regular rhythm.      Pulses: Normal pulses.      Heart sounds: S1 normal and S2 normal. Murmur heard.   Pulmonary:      Effort: Pulmonary effort is normal.      Breath sounds: Normal breath sounds.   Abdominal:      Palpations: Abdomen is soft.   Musculoskeletal:         General: No swelling.      Cervical back: Neck supple.      Right lower leg: No edema.      Left lower leg: No edema.   Skin:     General: Skin is warm and dry.      Capillary Refill: Capillary refill takes less than 2 seconds.      Findings: No bruising.   Neurological:      General: No focal deficit present.      Mental Status: She is alert and oriented to person, place, and time. Mental status is at baseline.      GCS: GCS eye subscore is 4. GCS verbal subscore is 5. GCS motor subscore is 6.      Motor: Motor function is intact.      Coordination: Coordination is intact.      Gait: Gait is intact.   Psychiatric:         Mood and Affect: Mood normal.         Speech: Speech normal.         Behavior: Behavior normal. Behavior is cooperative.         Cognition and Memory: Cognition normal.         Imaging/Labs:    Transesophageal ECHO (SOURAV) 6/30/22:  · Left ventricular wall thickness is consistent with mild concentric hypertrophy.  · Normal left ventricular " systolic function, estimated EF 60%.  · Bicuspid aortic valve with severe aortic valve stenosis is present. Peak velocity 4 and 21 cm/s. Aortic annulus 2.3 cm, STJ 2.48 cm.  · Moderate aortic insufficiency.  · Trace mitral regurgitation.  · Trace tricuspid regurgitation.      Cardiac catheterization 6/30/22:   · 40% RCA stenosis with normal IFR  · Otherwise normal coronary arteries  · Known severe/critical aortic stenosis      CTA TAVR Chest 3/25/22:   1. CTA of the chest, abdomen and pelvis with image data saved per TAVR  protocol.  2. Dense aortic valve calcification. No evidence of significant aortic  ectasia, sclerosis, dissection or advanced atherosclerotic disease.  3. Multinodular goiter.  4. Borderline dilatation of the main pancreatic duct and several  pancreatic cysts, which may be incidental, but any of which could  potentially represent an IPMN. Consider evaluation by GI service, when  convenient.  5. Bilateral nonobstructing renal calculi, right greater than left. Also  markedly dilated right renal collecting system and renal pelvis,  apparently chronic, and reflecting right UPJ stenosis whether acquired  or congenital stricture. No evidence of underlying obstructing calculus  or tumor. If this is not known from prior outside studies, consider  urology evaluation.  6. Focal irregular appearance of the 2nd-3rd portion the duodenum,  thought to be due to mass effect from the markedly dilated right renal  pelvis. Please correlate with any GI symptoms.    Results for orders placed during the hospital encounter of 03/25/22    Duplex Carotid Ultrasound CAR    Interpretation Summary  · Proximal right internal carotid artery is normal.  · Proximal left internal carotid artery is normal.      Assessment / Plan      Assessment / Plan:  Diagnoses and all orders for this visit:    1. Nonrheumatic aortic valve stenosis (Primary)     1. Severe aortic valve stenosis: Patient was seen by Dr. Mckay 1 year ago with  "recommendations for TAVR that was deferred due to patient being largely asymptomatic. Patient reports over the last year, patient has had interval worsening DUNCAN, fatigue and episodes of intermittent \"heart fluttering.\" Patient denies any chest pain. She follows closely with Dr. Redd, her primary cardiologist. She has already been scheduled for her TAVR on 7/28 of this month with Dr. Mckay.  She was incidentally found to have bilateral nonobstructing nephrolithiasis, right greater than left on CTA TAVR.  She has since seen urology Dr. Lopez for significant hydronephrosis of the right renal collecting system consistent with possible right ureteropelvic junction obstruction with plans for nuclear medicine renogram to further evaluate her renal function, differential function, obstruction as she has maintained renal function at this time. If patient were to develop symptoms of flank pain or decreased/declining renal function, urology may consider cystoscopy, diagnostic ureteroscopy, and stent placement in the future.  Discussed findings of patient's SOURAV revealing bicuspid aortic valve with severe aortic valve stenosis present, Peak velocity 4 and 21 cm/s, Aortic annulus 2.3 cm, STJ 2.48 cm. Cardiac catheterization with 40% RCA stenosis with normal IFR and otherwise normal coronaries.  Patient is already scheduled for PAT and TAVR procedure with Dr. Mckay.  I have answered all the patient's questions and discussed typical hospital stay. Risks of surgery were discussed with the patient including: bleeding, infection, blood clots, loss of limb, kidney damage, stroke, heart attack, or death.  Patient understands risks and agrees to proceed.  Plan see patient back in postoperative follow-up.    Follow Up:   Return for Next scheduled follow up.   Or sooner for any further concerns or worsening sign and symptoms. If unable to reach us in the office please dial 911 or go to the nearest emergency department.      Susan AVERY" Juno SOUZA  [facility] Cardiothoracic Surgery    Time Spent: I spent 25 minutes caring for Greta on this date of service. This time includes time spent by me in the following activities: preparing for the visit, reviewing tests, obtaining and/or reviewing a separately obtained history, performing a medically appropriate examination and/or evaluation, counseling and educating the patient/family/caregiver, documenting information in the medical record, independently interpreting results and communicating that information with the patient/family/caregiver and care coordination.

## 2022-07-12 ENCOUNTER — OFFICE VISIT (OUTPATIENT)
Dept: GASTROENTEROLOGY | Facility: CLINIC | Age: 67
End: 2022-07-12

## 2022-07-12 VITALS
TEMPERATURE: 97.5 F | BODY MASS INDEX: 25.84 KG/M2 | SYSTOLIC BLOOD PRESSURE: 116 MMHG | HEART RATE: 72 BPM | HEIGHT: 58 IN | OXYGEN SATURATION: 98 % | DIASTOLIC BLOOD PRESSURE: 68 MMHG | WEIGHT: 123.1 LBS

## 2022-07-12 DIAGNOSIS — R93.3 ABNORMAL CT SCAN, GASTROINTESTINAL TRACT: Primary | ICD-10-CM

## 2022-07-12 DIAGNOSIS — K86.2 PANCREATIC CYST: ICD-10-CM

## 2022-07-12 PROCEDURE — 99213 OFFICE O/P EST LOW 20 MIN: CPT | Performed by: NURSE PRACTITIONER

## 2022-07-12 NOTE — PROGRESS NOTES
Follow Up      Patient Name: Greta Marino  : 1955   MRN: [id number]     Chief Complaint:    Chief Complaint   Patient presents with   • Follow-up       History of Present Illness: Greta Marino is a 67 y.o. female who is here today for a pancreatic cyst.    Greta was undergoing work-up for TAVR.  At the time she had a CT angio which showed borderline dilation of the main pancreatic duct and several pancreatic cysts potentially IPMN.  No weight loss or jaundice.      No history of tobacco use.  No hx of pancreatitis. Her mother did have pancreatic cancer- she  at age 72.  Has TAVR scheduled for   Subjective      Review of Systems:   Review of Systems   Constitutional: Negative for appetite change and unexpected weight loss.   HENT: Negative for trouble swallowing.    Gastrointestinal: Negative for abdominal distention, abdominal pain, anal bleeding, blood in stool, constipation, diarrhea, nausea, rectal pain, vomiting, GERD and indigestion.   Skin: Negative for color change.       Medications:     Current Outpatient Medications:   •  benazepril (LOTENSIN) 40 MG tablet, Take 1 tablet by mouth Daily., Disp: 90 tablet, Rfl: 1  •  Cholecalciferol (Vitamin D3) 10 MCG (400 UNIT) capsule, Take 4,000 Units by mouth Daily., Disp: , Rfl:   •  NIFEdipine CC (ADALAT CC) 60 MG 24 hr tablet, Take 1 tablet by mouth Daily., Disp: 90 tablet, Rfl: 3  •  simvastatin (ZOCOR) 40 MG tablet, Take 1 tablet by mouth Every Night., Disp: 90 tablet, Rfl: 1  •  vitamin B-12 (CYANOCOBALAMIN) 1000 MCG tablet, Take 1,000 mcg by mouth Daily., Disp: , Rfl:     Allergies:   Allergies   Allergen Reactions   • Peanut-Containing Drug Products Hives       Social History:   Social History     Socioeconomic History   • Marital status:    • Number of children: 2   • Highest education level: High school graduate   Tobacco Use   • Smoking status: Never Smoker   • Smokeless tobacco: Never Used   Vaping Use   • Vaping Use: Never  "used   Substance and Sexual Activity   • Alcohol use: Yes     Comment: on occasion 1 glass per month   • Drug use: Never   • Sexual activity: Not Currently     Partners: Male     Birth control/protection: None     Comment:         Surgical History:   Past Surgical History:   Procedure Laterality Date   • ANKLE SURGERY Left 1980's   • BREAST BIOPSY Right 1995   • CARDIAC CATHETERIZATION Left 6/30/2022    Procedure: Left Heart Cath;  Surgeon: Jaylan Redd MD;  [facility];  Service: Cardiovascular;  Laterality: Left;  to be scheduled in next 1-2 months   • COLONOSCOPY  8/24/2020, 2005    8/24/2020- Dr. Bowling. 2005-Dr. Spence in [place], [place]   • FRACTURE SURGERY  1998   • KNEE SURGERY Right 12/20/2018    meniscus repair   • TRANSESOPHAGEAL ECHOCARDIOGRAM (SOURAV)          Medical History:   Past Medical History:   Diagnosis Date   • Arthritis    • Asthma    • Coronary artery disease    • Heart murmur    • Heart valve disease    • History of mammography, screening 2017    Done in [place]    • History of Papanicolaou smear of cervix 2017    Done in [place]   • History of shingles 2015   • Hyperlipidemia    • Hypertension    • Kidney stones    • Pancreatic cyst         Objective     Physical Exam:  Vital Signs:   Vitals:    07/12/22 1113   BP: 116/68   BP Location: Right arm   Patient Position: Sitting   Cuff Size: Adult   Pulse: 72   Temp: 97.5 °F (36.4 °C)   TempSrc: Temporal   SpO2: 98%   Weight: 55.8 kg (123 lb 1.6 oz)   Height: 147.3 cm (58\")     Body mass index is 25.73 kg/m².     Physical Exam  Vitals and nursing note reviewed.   Constitutional:       General: She is not in acute distress.     Appearance: She is well-developed. She is not diaphoretic.   Eyes:      General: No scleral icterus.     Conjunctiva/sclera: Conjunctivae normal.   Neck:      Thyroid: No thyromegaly.   Cardiovascular:      Rate and Rhythm: Normal rate and regular rhythm.      Heart sounds: Murmur " heard.   Pulmonary:      Effort: Pulmonary effort is normal.      Breath sounds: Normal breath sounds.   Abdominal:      General: Bowel sounds are normal. There is no distension.      Palpations: Abdomen is soft.      Tenderness: There is no abdominal tenderness. There is no guarding or rebound.      Hernia: No hernia is present.   Musculoskeletal:      Cervical back: Neck supple.      Right lower leg: No edema.      Left lower leg: No edema.   Skin:     General: Skin is warm and dry.      Capillary Refill: Capillary refill takes 2 to 3 seconds.      Coloration: Skin is not jaundiced or pale.      Findings: No bruising or petechiae.      Nails: There is no clubbing.   Neurological:      Mental Status: She is alert and oriented to person, place, and time.   Psychiatric:         Behavior: Behavior normal.         Thought Content: Thought content normal.         Judgment: Judgment normal.         Assessment / Plan      Assessment/Plan:   Diagnoses and all orders for this visit:    1. Abnormal CT scan, gastrointestinal tract (Primary)  -     MRI abdomen w wo contrast mrcp; Future    2. Pancreatic cyst  -     MRI abdomen w wo contrast mrcp; Future       Patient undergoing TAVR at the end of this month.  Defer EUS and proceed with MRCP.  Follow Up:   No follow-ups on file.    Plan of care reviewed with the patient at the conclusion of today's visit.  Education was provided regarding diagnosis, management, and any prescribed or recommended OTC medications.  Patient verbalized understanding of and agreement with management plan.     Time Statement:   Discussed plan of care in detail with patient today. Patient verbally understands and agrees. I have spent 22 minutes reviewing available diagnostics, obtaining history, examining the patient, developing a treatment plan, and educating the patient on disease process and plan of care.     LIBBY Felton  [facility] [place] [facility]

## 2022-07-14 ENCOUNTER — HOSPITAL ENCOUNTER (OUTPATIENT)
Dept: NUCLEAR MEDICINE | Facility: HOSPITAL | Age: 67
Discharge: HOME | End: 2022-07-14

## 2022-07-14 DIAGNOSIS — N13.5 URETEROPELVIC JUNCTION (UPJ) OBSTRUCTION: ICD-10-CM

## 2022-07-14 PROCEDURE — 0 TECHNETIUM MERTIATIDE: Performed by: UROLOGY

## 2022-07-14 PROCEDURE — 78708 K FLOW/FUNCT IMAGE W/DRUG: CPT

## 2022-07-14 PROCEDURE — A9562 TC99M MERTIATIDE: HCPCS | Performed by: UROLOGY

## 2022-07-14 PROCEDURE — 25010000002 FUROSEMIDE PER 20 MG: Performed by: UROLOGY

## 2022-07-14 RX ORDER — FUROSEMIDE 10 MG/ML
20 INJECTION INTRAMUSCULAR; INTRAVENOUS ONCE
Status: COMPLETED | OUTPATIENT
Start: 2022-07-14 | End: 2022-07-14

## 2022-07-14 RX ADMIN — TECHNESCAN TC 99M MERTIATIDE 1 DOSE: 1 INJECTION, POWDER, LYOPHILIZED, FOR SOLUTION INTRAVENOUS at 08:35

## 2022-07-14 RX ADMIN — FUROSEMIDE 20 MG: 10 INJECTION, SOLUTION INTRAMUSCULAR; INTRAVENOUS at 09:01

## 2022-07-19 ENCOUNTER — HOSPITAL ENCOUNTER (OUTPATIENT)
Dept: MRI IMAGING | Facility: HOSPITAL | Age: 67
Discharge: HOME | End: 2022-07-19
Admitting: NURSE PRACTITIONER

## 2022-07-19 ENCOUNTER — OFFICE VISIT (OUTPATIENT)
Dept: UROLOGY | Facility: CLINIC | Age: 67
End: 2022-07-19

## 2022-07-19 DIAGNOSIS — N13.5 URETEROPELVIC JUNCTION (UPJ) OBSTRUCTION: Primary | ICD-10-CM

## 2022-07-19 DIAGNOSIS — R93.3 ABNORMAL CT SCAN, GASTROINTESTINAL TRACT: ICD-10-CM

## 2022-07-19 DIAGNOSIS — N20.0 NEPHROLITHIASIS: ICD-10-CM

## 2022-07-19 DIAGNOSIS — K86.2 PANCREATIC CYST: ICD-10-CM

## 2022-07-19 PROCEDURE — 0 GADOBENATE DIMEGLUMINE 529 MG/ML SOLUTION: Performed by: NURSE PRACTITIONER

## 2022-07-19 PROCEDURE — 74183 MRI ABD W/O CNTR FLWD CNTR: CPT

## 2022-07-19 PROCEDURE — A9577 INJ MULTIHANCE: HCPCS | Performed by: NURSE PRACTITIONER

## 2022-07-19 PROCEDURE — 99214 OFFICE O/P EST MOD 30 MIN: CPT | Performed by: UROLOGY

## 2022-07-19 RX ADMIN — GADOBENATE DIMEGLUMINE 10 ML: 529 INJECTION, SOLUTION INTRAVENOUS at 07:40

## 2022-07-19 NOTE — PROGRESS NOTES
Office Visit New Urology      Patient Name: Greta Marino  : 1955   MRN: [id number]     Chief Complaint: Flank pain, ureteropelvic junction obstruction.    History of Present Illness: Greta Marino is a 67 y.o. female who presents today for 2-week follow-up with nuclear medicine renogram.  Patient was recently identified to have concern for right ureteropelvic junction obstruction, nonobstructing right renal stone.  At last visit CT imaging was reviewed, discussed concern regarding possible ureteropelvic junction obstruction.  She presents today with imaging study.  She continues to report mild flank pain.  She denies nausea, emesis, fever, chills.    Of note, she has medical history significant for valvular disease.  She is planned to undergo valve replacement with Dr. Mckay 2022.      Subjective      Review of System: Review of Systems   Constitutional: Negative for chills, fatigue, fever and unexpected weight change.   HENT: Negative for sore throat.    Eyes: Negative for visual disturbance.   Respiratory: Negative for cough, chest tightness and shortness of breath.    Cardiovascular: Negative for chest pain and leg swelling.   Gastrointestinal: Negative for blood in stool, constipation, diarrhea, nausea, rectal pain and vomiting.   Genitourinary: Negative for decreased urine volume, difficulty urinating, dysuria, enuresis, flank pain, frequency, genital sores, hematuria and urgency.   Musculoskeletal: Negative for back pain and joint swelling.   Skin: Negative for rash and wound.   Neurological: Negative for seizures, speech difficulty, weakness and headaches.   Psychiatric/Behavioral: Negative for confusion, sleep disturbance and suicidal ideas. The patient is not nervous/anxious.       I have reviewed the ROS documented by my clinical staff, updated appropriately and I agree. Shahid Lopez MD    Past Medical History:   Past Medical History:   Diagnosis Date   • Arthritis    • Asthma    •  Coronary artery disease    • Heart murmur    • Heart valve disease    • History of mammography, screening 2017    Done in [place]    • History of Papanicolaou smear of cervix 2017    Done in [place]   • History of shingles 2015   • Hyperlipidemia    • Hypertension    • Kidney stones    • Pancreatic cyst        Past Surgical History:   Past Surgical History:   Procedure Laterality Date   • ANKLE SURGERY Left 1980's   • BREAST BIOPSY Right 1995   • CARDIAC CATHETERIZATION Left 6/30/2022    Procedure: Left Heart Cath;  Surgeon: Jaylan Redd MD;  Location: [facility] INVASIVE LOC[facility];  Service: Cardiovascular;  Laterality: Left;  to be scheduled in next 1-2 months   • COLONOSCOPY  8/24/2020, 2005    8/24/2020- Dr. Bowling. 2005-Dr. Spence in [place], [place]   • FRACTURE SURGERY  1998   • KNEE SURGERY Right 12/20/2018    meniscus repair   • TRANSESOPHAGEAL ECHOCARDIOGRAM (SOURAV)         Family History:   Family History   Problem Relation Age of Onset   • Pancreatic cancer Mother         Passed away in 2002   • Arthritis Mother    • Cancer Mother         Pancreatic cancer   • Diabetes Father    • Heart attack Father         passed in 2000   • Hypertension Father    • Colon cancer Father    • Cancer Father         Colon cancer   • Diabetes Brother         Type 2   • Hypertension Brother    • Hypertension Brother    • Stroke Brother    • Sarcoidosis Daughter    • Breast cancer Neg Hx    • Ovarian cancer Neg Hx        Social History:   Social History     Socioeconomic History   • Marital status:    • Number of children: 2   • Highest education level: High school graduate   Tobacco Use   • Smoking status: Never Smoker   • Smokeless tobacco: Never Used   Vaping Use   • Vaping Use: Never used   Substance and Sexual Activity   • Alcohol use: Yes     Comment: on occasion 1 glass per month   • Drug use: Never   • Sexual activity: Not Currently     Partners: Male     Birth control/protection: None     Comment:         Medications:     Current Outpatient Medications:   •  benazepril (LOTENSIN) 40 MG tablet, Take 1 tablet by mouth Daily., Disp: 90 tablet, Rfl: 1  •  Cholecalciferol (Vitamin D3) 10 MCG (400 UNIT) capsule, Take 4,000 Units by mouth Daily., Disp: , Rfl:   •  NIFEdipine CC (ADALAT CC) 60 MG 24 hr tablet, Take 1 tablet by mouth Daily., Disp: 90 tablet, Rfl: 3  •  simvastatin (ZOCOR) 40 MG tablet, Take 1 tablet by mouth Every Night., Disp: 90 tablet, Rfl: 1  •  vitamin B-12 (CYANOCOBALAMIN) 1000 MCG tablet, Take 1,000 mcg by mouth Daily., Disp: , Rfl:     Allergies:   Allergies   Allergen Reactions   • Peanut-Containing Drug Products Hives           Objective     Physical Exam:   Vital Signs: There were no vitals filed for this visit.  There is no height or weight on file to calculate BMI.     Physical Exam  Vitals and nursing note reviewed.   Constitutional:       Appearance: Normal appearance.   HENT:      Head: Normocephalic and atraumatic.   Cardiovascular:      Comments: Well perfused  Pulmonary:      Effort: Pulmonary effort is normal.   Abdominal:      General: Abdomen is flat.      Palpations: Abdomen is soft.   Musculoskeletal:         General: Normal range of motion.   Skin:     General: Skin is warm and dry.   Neurological:      General: No focal deficit present.      Mental Status: She is alert and oriented to person, place, and time. Mental status is at baseline.   Psychiatric:         Mood and Affect: Mood normal.         Behavior: Behavior normal.         Thought Content: Thought content normal.         Judgment: Judgment normal.             Labs:   Brief Urine Lab Results  (Last result in the past 365 days)      Color   Clarity   Blood   Leuk Est   Nitrite   Protein   CREAT   Urine HCG        04/26/22 1417 Yellow   Clear   3+   Small (1+)   Negative   Negative                      Lab Results   Component Value Date    GLUCOSE 103 (H) 06/30/2022    CALCIUM 9.5 06/30/2022     06/30/2022     K 4.1 06/30/2022    CO2 25.0 06/30/2022     06/30/2022    BUN 20 06/30/2022    CREATININE 0.79 06/30/2022    EGFRIFAFRI 106 03/24/2021    EGFRIFNONA 87 03/24/2021    BCR 25.3 (H) 06/30/2022    ANIONGAP 10.0 06/30/2022       Lab Results   Component Value Date    WBC 5.24 06/30/2022    HGB 12.9 06/30/2022    HCT 37.6 06/30/2022    MCV 94.5 06/30/2022     06/30/2022       Images:   NM Renal With Flow & Function With Pharmacological Intervention    Result Date: 7/14/2022   1. Normal function of the left kidney with no evidence of hydronephrosis. 2. Impaired function of the right kidney with decreased uptake, poor function and delayed excretion likely related to poor function and hydronephrosis as seen on CT 03/25/2022.  This report was finalized on 7/14/2022 4:03 PM by Aleshia Martinez MD.      MRI abdomen w wo contrast mrcp    Result Date: 7/19/2022  1. 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 Largest cystic lesion measures 1.6 cm. Recommend MRCP follow-up in one year to document stability. No main duct dilatation or solid pancreatic mass. 2. Moderate right hydronephrosis with renal pelvic dilatation which may relate to chronic UPJ stenosis, improved from CT on 03/25/2022. Nonobstructing right lower pole renal calculi again noted. 3. Additional chronic findings above.  This report was finalized on 7/19/2022 8:52 AM by Eyad Walter MD.        CT Angio TAVR Chest Abdomen Pelvis    Result Date: 3/28/2022  1. CTA of the chest, abdomen and pelvis with image data saved per TAVR protocol. 2. Dense aortic valve calcification. No evidence of significant aortic ectasia, sclerosis, dissection or advanced atherosclerotic disease. 3. Multinodular goiter. 4. Borderline dilatation of the main pancreatic duct and several pancreatic cysts, which may be incidental, but any of which could potentially represent an IPMN. Consider  evaluation by GI service, when convenient. 5. Bilateral nonobstructing renal calculi, right greater than left. Also markedly dilated right renal collecting system and renal pelvis, apparently chronic, and reflecting right UPJ stenosis whether acquired or congenital stricture. No evidence of underlying obstructing calculus or tumor. If this is not known from prior outside studies, consider urology evaluation. 6. Focal irregular appearance of the 2nd-3rd portion the duodenum, thought to be due to mass effect from the markedly dilated right renal pelvis. Please correlate with any GI symptoms.  This report was finalized on 3/28/2022 10:42 AM by Dr. Tony Sparks MD.        Measures:   Tobacco:   Greta Marino  reports that she has never smoked. She has never used smokeless tobacco.. I have educated her on the risk of diseases from using tobacco products.           Urine Incontinence: ( NOUI)  Patient reports that she is not currently experiencing any symptoms of urinary incontinence.    Assessment / Plan      Assessment/Plan:   67 y.o. female is here today for 2-week follow-up after nuclear medicine renogram for evaluation of possible right ureteropelvic junction obstruction.  Nuclear medicine renogram demonstrates poor uptake of the right kidney, delayed excretion.  Differential function left 76%, right 23%.  Right T1 half 36 minutes.     Today we have discussed findings of nuclear medicine renogram.  We have discussed that she has loss of right renal function, delayed drainage with T1 half demonstrating obstructive pattern.  She currently denies significant symptoms, LINDSEY mild right flank pain.  We have discussed her nonobstructing right renal stones are likely due to poor renal drainage.  She is planned to undergo valve replacement with Dr. Mckay of CT surgery on 7/28/2022.    We have discussed possible intervention, need for intervention due to loss of right renal function based upon imaging findings.  This will  require delay until after her valve replacement has been performed.  We will have her follow-up in approximately 6 to 8 weeks for further discussion of management, continued evaluation.  She is understanding agreeable plan of care.    Diagnoses and all orders for this visit:    1. Ureteropelvic junction (UPJ) obstruction (Primary)    2. Nephrolithiasis           Follow Up:   Return in about 6 weeks (around 8/30/2022) for Recheck.    I spent approximately 30 minutes providing clinical care for this patient; including review of patient's chart and provider documentation, face to face time spent with patient in examination room (obtaining history, performing physical exam, discussing diagnosis and management options), placing orders, and completing patient documentation.     Shahid Lpoez MD  [facility] [place]

## 2022-07-21 ENCOUNTER — PREP FOR SURGERY (OUTPATIENT)
Dept: OTHER | Facility: HOSPITAL | Age: 67
End: 2022-07-21

## 2022-07-21 DIAGNOSIS — I35.0 NONRHEUMATIC AORTIC VALVE STENOSIS: Primary | ICD-10-CM

## 2022-07-21 RX ORDER — NITROGLYCERIN 0.4 MG/1
0.4 TABLET SUBLINGUAL
Status: CANCELLED | OUTPATIENT
Start: 2022-07-28

## 2022-07-21 RX ORDER — CHLORHEXIDINE GLUCONATE 0.12 MG/ML
15 RINSE ORAL ONCE
Status: CANCELLED | OUTPATIENT
Start: 2022-07-28 | End: 2022-07-28

## 2022-07-21 RX ORDER — CHLORHEXIDINE GLUCONATE 500 MG/1
1 CLOTH TOPICAL EVERY 12 HOURS PRN
Status: CANCELLED | OUTPATIENT
Start: 2022-07-26

## 2022-07-21 RX ORDER — CHLORHEXIDINE GLUCONATE 500 MG/1
1 CLOTH TOPICAL EVERY 12 HOURS PRN
Status: CANCELLED | OUTPATIENT
Start: 2022-07-28

## 2022-07-21 RX ORDER — ASPIRIN 325 MG
325 TABLET ORAL NIGHTLY
Status: CANCELLED | OUTPATIENT
Start: 2022-07-26 | End: 2022-07-27

## 2022-07-22 PROBLEM — N20.0 NEPHROLITHIASIS: Status: ACTIVE | Noted: 2022-07-22

## 2022-07-22 PROBLEM — N13.5 URETEROPELVIC JUNCTION (UPJ) OBSTRUCTION: Status: ACTIVE | Noted: 2022-07-22

## 2022-07-26 ENCOUNTER — PRE-ADMISSION TESTING (OUTPATIENT)
Dept: PREADMISSION TESTING | Facility: HOSPITAL | Age: 67
End: 2022-07-26

## 2022-07-26 ENCOUNTER — HOSPITAL ENCOUNTER (OUTPATIENT)
Dept: GENERAL RADIOLOGY | Facility: HOSPITAL | Age: 67
Discharge: HOME | End: 2022-07-26

## 2022-07-26 ENCOUNTER — HOSPITAL ENCOUNTER (OUTPATIENT)
Dept: PULMONOLOGY | Facility: HOSPITAL | Age: 67
Discharge: HOME | End: 2022-07-26

## 2022-07-26 VITALS — HEIGHT: 58 IN | OXYGEN SATURATION: 98 % | WEIGHT: 124.34 LBS | BODY MASS INDEX: 26.1 KG/M2

## 2022-07-26 DIAGNOSIS — I35.0 NONRHEUMATIC AORTIC VALVE STENOSIS: ICD-10-CM

## 2022-07-26 LAB
ABO GROUP BLD: NORMAL
ALBUMIN SERPL-MCNC: 4.7 G/DL (ref 3.5–5.2)
ALBUMIN/GLOB SERPL: 1.6 G/DL
ALP SERPL-CCNC: 91 U/L (ref 39–117)
ALT SERPL W P-5'-P-CCNC: 11 U/L (ref 1–33)
AMPHET+METHAMPHET UR QL: NEGATIVE
AMPHETAMINES UR QL: NEGATIVE
ANION GAP SERPL CALCULATED.3IONS-SCNC: 9 MMOL/L (ref 5–15)
APTT PPP: 27.3 SECONDS (ref 22–39)
AST SERPL-CCNC: 18 U/L (ref 1–32)
BARBITURATES UR QL SCN: NEGATIVE
BASOPHILS # BLD AUTO: 0.04 10*3/MM3 (ref 0–0.2)
BASOPHILS NFR BLD AUTO: 0.9 % (ref 0–1.5)
BENZODIAZ UR QL SCN: NEGATIVE
BILIRUB SERPL-MCNC: 0.7 MG/DL (ref 0–1.2)
BLD GP AB SCN SERPL QL: NEGATIVE
BUN SERPL-MCNC: 17 MG/DL (ref 8–23)
BUN/CREAT SERPL: 23 (ref 7–25)
BUPRENORPHINE SERPL-MCNC: NEGATIVE NG/ML
CALCIUM SPEC-SCNC: 9.8 MG/DL (ref 8.6–10.5)
CANNABINOIDS SERPL QL: NEGATIVE
CHLORIDE SERPL-SCNC: 104 MMOL/L (ref 98–107)
CO2 SERPL-SCNC: 27 MMOL/L (ref 22–29)
COCAINE UR QL: NEGATIVE
CREAT SERPL-MCNC: 0.74 MG/DL (ref 0.57–1)
DEPRECATED RDW RBC AUTO: 41.8 FL (ref 37–54)
EGFRCR SERPLBLD CKD-EPI 2021: 89.4 ML/MIN/1.73
EOSINOPHIL # BLD AUTO: 0.11 10*3/MM3 (ref 0–0.4)
EOSINOPHIL NFR BLD AUTO: 2.5 % (ref 0.3–6.2)
ERYTHROCYTE [DISTWIDTH] IN BLOOD BY AUTOMATED COUNT: 11.9 % (ref 12.3–15.4)
FLUAV SUBTYP SPEC NAA+PROBE: NOT DETECTED
FLUBV RNA ISLT QL NAA+PROBE: NOT DETECTED
GLOBULIN UR ELPH-MCNC: 2.9 GM/DL
GLUCOSE SERPL-MCNC: 101 MG/DL (ref 65–99)
HBA1C MFR BLD: 5 % (ref 4.8–5.6)
HCT VFR BLD AUTO: 39.7 % (ref 34–46.6)
HGB BLD-MCNC: 13.2 G/DL (ref 12–15.9)
IMM GRANULOCYTES # BLD AUTO: 0.01 10*3/MM3 (ref 0–0.05)
IMM GRANULOCYTES NFR BLD AUTO: 0.2 % (ref 0–0.5)
INR PPP: 0.97 (ref 0.84–1.13)
LYMPHOCYTES # BLD AUTO: 1.14 10*3/MM3 (ref 0.7–3.1)
LYMPHOCYTES NFR BLD AUTO: 25.7 % (ref 19.6–45.3)
MAGNESIUM SERPL-MCNC: 2 MG/DL (ref 1.6–2.4)
MCH RBC QN AUTO: 31.6 PG (ref 26.6–33)
MCHC RBC AUTO-ENTMCNC: 33.2 G/DL (ref 31.5–35.7)
MCV RBC AUTO: 95 FL (ref 79–97)
METHADONE UR QL SCN: NEGATIVE
MONOCYTES # BLD AUTO: 0.28 10*3/MM3 (ref 0.1–0.9)
MONOCYTES NFR BLD AUTO: 6.3 % (ref 5–12)
NEUTROPHILS NFR BLD AUTO: 2.86 10*3/MM3 (ref 1.7–7)
NEUTROPHILS NFR BLD AUTO: 64.4 % (ref 42.7–76)
NRBC BLD AUTO-RTO: 0 /100 WBC (ref 0–0.2)
OPIATES UR QL: NEGATIVE
OXYCODONE UR QL SCN: NEGATIVE
PA ADP PRP-ACNC: 242 PRU
PCP UR QL SCN: NEGATIVE
PLATELET # BLD AUTO: 192 10*3/MM3 (ref 140–450)
PMV BLD AUTO: 9.7 FL (ref 6–12)
POTASSIUM SERPL-SCNC: 3.9 MMOL/L (ref 3.5–5.2)
PROPOXYPH UR QL: NEGATIVE
PROT SERPL-MCNC: 7.6 G/DL (ref 6–8.5)
PROTHROMBIN TIME: 12.8 SECONDS (ref 11.4–14.4)
QT INTERVAL: 406 MS
QTC INTERVAL: 450 MS
RBC # BLD AUTO: 4.18 10*6/MM3 (ref 3.77–5.28)
RH BLD: NEGATIVE
SARS-COV-2 RNA PNL SPEC NAA+PROBE: NOT DETECTED
SODIUM SERPL-SCNC: 140 MMOL/L (ref 136–145)
T&S EXPIRATION DATE: NORMAL
TRICYCLICS UR QL SCN: NEGATIVE
WBC NRBC COR # BLD: 4.44 10*3/MM3 (ref 3.4–10.8)

## 2022-07-26 PROCEDURE — 86923 COMPATIBILITY TEST ELECTRIC: CPT

## 2022-07-26 PROCEDURE — 94010 BREATHING CAPACITY TEST: CPT | Performed by: INTERNAL MEDICINE

## 2022-07-26 PROCEDURE — 85730 THROMBOPLASTIN TIME PARTIAL: CPT

## 2022-07-26 PROCEDURE — 85025 COMPLETE CBC W/AUTO DIFF WBC: CPT

## 2022-07-26 PROCEDURE — 36415 COLL VENOUS BLD VENIPUNCTURE: CPT

## 2022-07-26 PROCEDURE — 83735 ASSAY OF MAGNESIUM: CPT

## 2022-07-26 PROCEDURE — 87636 SARSCOV2 & INF A&B AMP PRB: CPT

## 2022-07-26 PROCEDURE — 85576 BLOOD PLATELET AGGREGATION: CPT

## 2022-07-26 PROCEDURE — 86901 BLOOD TYPING SEROLOGIC RH(D): CPT

## 2022-07-26 PROCEDURE — 86900 BLOOD TYPING SEROLOGIC ABO: CPT

## 2022-07-26 PROCEDURE — 83036 HEMOGLOBIN GLYCOSYLATED A1C: CPT

## 2022-07-26 PROCEDURE — 80306 DRUG TEST PRSMV INSTRMNT: CPT

## 2022-07-26 PROCEDURE — 85610 PROTHROMBIN TIME: CPT

## 2022-07-26 PROCEDURE — C9803 HOPD COVID-19 SPEC COLLECT: HCPCS

## 2022-07-26 PROCEDURE — 71046 X-RAY EXAM CHEST 2 VIEWS: CPT

## 2022-07-26 PROCEDURE — 93010 ELECTROCARDIOGRAM REPORT: CPT | Performed by: INTERNAL MEDICINE

## 2022-07-26 PROCEDURE — 86850 RBC ANTIBODY SCREEN: CPT

## 2022-07-26 PROCEDURE — 93005 ELECTROCARDIOGRAM TRACING: CPT

## 2022-07-26 PROCEDURE — 80053 COMPREHEN METABOLIC PANEL: CPT

## 2022-07-26 PROCEDURE — 94010 BREATHING CAPACITY TEST: CPT

## 2022-07-26 RX ORDER — ASPIRIN 325 MG
325 TABLET ORAL NIGHTLY
Status: SHIPPED | OUTPATIENT
Start: 2022-07-26 | End: 2022-07-27

## 2022-07-26 RX ORDER — CHLORHEXIDINE GLUCONATE 500 MG/1
1 CLOTH TOPICAL EVERY 12 HOURS PRN
Status: DISCONTINUED | OUTPATIENT
Start: 2022-07-26 | End: 2022-08-08

## 2022-07-27 ENCOUNTER — ANESTHESIA EVENT (OUTPATIENT)
Dept: PERIOP | Facility: HOSPITAL | Age: 67
End: 2022-07-27

## 2022-07-27 RX ORDER — FAMOTIDINE 10 MG/ML
20 INJECTION, SOLUTION INTRAVENOUS ONCE
Status: CANCELLED | OUTPATIENT
Start: 2022-07-27 | End: 2022-07-27

## 2022-07-28 ENCOUNTER — ANESTHESIA EVENT CONVERTED (OUTPATIENT)
Dept: ANESTHESIOLOGY | Facility: HOSPITAL | Age: 67
End: 2022-07-28

## 2022-07-28 ENCOUNTER — ANESTHESIA (OUTPATIENT)
Dept: PERIOP | Facility: HOSPITAL | Age: 67
End: 2022-07-28

## 2022-07-28 ENCOUNTER — ANCILLARY PROCEDURE (OUTPATIENT)
Dept: PERIOP | Facility: HOSPITAL | Age: 67
End: 2022-07-28

## 2022-07-28 ENCOUNTER — HOSPITAL ENCOUNTER (INPATIENT)
Facility: HOSPITAL | Age: 67
LOS: 2 days | Discharge: HOME | End: 2022-07-30
Attending: THORACIC SURGERY (CARDIOTHORACIC VASCULAR SURGERY) | Admitting: THORACIC SURGERY (CARDIOTHORACIC VASCULAR SURGERY)

## 2022-07-28 DIAGNOSIS — I35.0 NONRHEUMATIC AORTIC VALVE STENOSIS: ICD-10-CM

## 2022-07-28 DIAGNOSIS — I35.9 AORTIC VALVE DISORDER: ICD-10-CM

## 2022-07-28 DIAGNOSIS — I31.39 PERICARDIAL EFFUSION: Primary | ICD-10-CM

## 2022-07-28 LAB
ABO GROUP BLD: NORMAL
ACT BLD: 126 SECONDS (ref 82–152)
ACT BLD: 126 SECONDS (ref 82–152)
ANION GAP SERPL CALCULATED.3IONS-SCNC: 9 MMOL/L (ref 5–15)
APTT PPP: 26.2 SECONDS (ref 22–39)
APTT PPP: 30.6 SECONDS (ref 22–39)
BUN SERPL-MCNC: 15 MG/DL (ref 8–23)
BUN/CREAT SERPL: 21.7 (ref 7–25)
CALCIUM SPEC-SCNC: 8.7 MG/DL (ref 8.6–10.5)
CHLORIDE SERPL-SCNC: 107 MMOL/L (ref 98–107)
CO2 SERPL-SCNC: 25 MMOL/L (ref 22–29)
CREAT SERPL-MCNC: 0.69 MG/DL (ref 0.57–1)
DEPRECATED RDW RBC AUTO: 42.4 FL (ref 37–54)
DEPRECATED RDW RBC AUTO: 43 FL (ref 37–54)
EGFRCR SERPLBLD CKD-EPI 2021: 95.9 ML/MIN/1.73
ERYTHROCYTE [DISTWIDTH] IN BLOOD BY AUTOMATED COUNT: 12.3 % (ref 12.3–15.4)
ERYTHROCYTE [DISTWIDTH] IN BLOOD BY AUTOMATED COUNT: 12.3 % (ref 12.3–15.4)
GLUCOSE BLDC GLUCOMTR-MCNC: 127 MG/DL (ref 70–130)
GLUCOSE SERPL-MCNC: 151 MG/DL (ref 65–99)
HCT VFR BLD AUTO: 30.5 % (ref 34–46.6)
HCT VFR BLD AUTO: 33.4 % (ref 34–46.6)
HGB BLD-MCNC: 10.8 G/DL (ref 12–15.9)
HGB BLD-MCNC: 11.5 G/DL (ref 12–15.9)
INR PPP: 0.91 (ref 0.84–1.13)
MCH RBC QN AUTO: 32.9 PG (ref 26.6–33)
MCH RBC QN AUTO: 33 PG (ref 26.6–33)
MCHC RBC AUTO-ENTMCNC: 34.4 G/DL (ref 31.5–35.7)
MCHC RBC AUTO-ENTMCNC: 35.4 G/DL (ref 31.5–35.7)
MCV RBC AUTO: 93.3 FL (ref 79–97)
MCV RBC AUTO: 95.4 FL (ref 79–97)
PLATELET # BLD AUTO: 242 10*3/MM3 (ref 140–450)
PLATELET # BLD AUTO: 277 10*3/MM3 (ref 140–450)
PMV BLD AUTO: 9.6 FL (ref 6–12)
PMV BLD AUTO: 9.6 FL (ref 6–12)
POTASSIUM SERPL-SCNC: 4 MMOL/L (ref 3.5–5.2)
PROTHROMBIN TIME: 12.2 SECONDS (ref 11.4–14.4)
QT INTERVAL: 434 MS
QTC INTERVAL: 533 MS
RBC # BLD AUTO: 3.27 10*6/MM3 (ref 3.77–5.28)
RBC # BLD AUTO: 3.5 10*6/MM3 (ref 3.77–5.28)
RH BLD: NEGATIVE
SODIUM SERPL-SCNC: 141 MMOL/L (ref 136–145)
WBC NRBC COR # BLD: 12.02 10*3/MM3 (ref 3.4–10.8)
WBC NRBC COR # BLD: 9.56 10*3/MM3 (ref 3.4–10.8)

## 2022-07-28 PROCEDURE — C1769 GUIDE WIRE: HCPCS | Performed by: THORACIC SURGERY (CARDIOTHORACIC VASCULAR SURGERY)

## 2022-07-28 PROCEDURE — 25010000002 FENTANYL CITRATE (PF) 50 MCG/ML SOLUTION: Performed by: NURSE ANESTHETIST, CERTIFIED REGISTERED

## 2022-07-28 PROCEDURE — 25010000002 ANTI-INHIBITOR COAGULANT COMPLEX: Performed by: THORACIC SURGERY (CARDIOTHORACIC VASCULAR SURGERY)

## 2022-07-28 PROCEDURE — 0 IOPAMIDOL PER 1 ML: Performed by: THORACIC SURGERY (CARDIOTHORACIC VASCULAR SURGERY)

## 2022-07-28 PROCEDURE — 85730 THROMBOPLASTIN TIME PARTIAL: CPT | Performed by: THORACIC SURGERY (CARDIOTHORACIC VASCULAR SURGERY)

## 2022-07-28 PROCEDURE — 86927 PLASMA FRESH FROZEN: CPT

## 2022-07-28 PROCEDURE — 25010000002 DIPHENHYDRAMINE PER 50 MG: Performed by: INTERNAL MEDICINE

## 2022-07-28 PROCEDURE — 93010 ELECTROCARDIOGRAM REPORT: CPT | Performed by: STUDENT IN AN ORGANIZED HEALTH CARE EDUCATION/TRAINING PROGRAM

## 2022-07-28 PROCEDURE — 99222 1ST HOSP IP/OBS MODERATE 55: CPT | Performed by: INTERNAL MEDICINE

## 2022-07-28 PROCEDURE — 0 CEFUROXIME SODIUM 1.5 G RECONSTITUTED SOLUTION

## 2022-07-28 PROCEDURE — 0W9D3ZZ DRAINAGE OF PERICARDIAL CAVITY, PERCUTANEOUS APPROACH: ICD-10-PCS | Performed by: THORACIC SURGERY (CARDIOTHORACIC VASCULAR SURGERY)

## 2022-07-28 PROCEDURE — 86901 BLOOD TYPING SEROLOGIC RH(D): CPT

## 2022-07-28 PROCEDURE — 85730 THROMBOPLASTIN TIME PARTIAL: CPT | Performed by: STUDENT IN AN ORGANIZED HEALTH CARE EDUCATION/TRAINING PROGRAM

## 2022-07-28 PROCEDURE — 82947 ASSAY GLUCOSE BLOOD QUANT: CPT

## 2022-07-28 PROCEDURE — C1887 CATHETER, GUIDING: HCPCS | Performed by: THORACIC SURGERY (CARDIOTHORACIC VASCULAR SURGERY)

## 2022-07-28 PROCEDURE — 86900 BLOOD TYPING SEROLOGIC ABO: CPT

## 2022-07-28 PROCEDURE — 25010000002 PHENYLEPHRINE 10 MG/ML SOLUTION 1 ML VIAL: Performed by: NURSE ANESTHETIST, CERTIFIED REGISTERED

## 2022-07-28 PROCEDURE — 93355 ECHO TRANSESOPHAGEAL (TEE): CPT

## 2022-07-28 PROCEDURE — 84132 ASSAY OF SERUM POTASSIUM: CPT

## 2022-07-28 PROCEDURE — 85610 PROTHROMBIN TIME: CPT | Performed by: THORACIC SURGERY (CARDIOTHORACIC VASCULAR SURGERY)

## 2022-07-28 PROCEDURE — B246ZZ4 ULTRASONOGRAPHY OF RIGHT AND LEFT HEART, TRANSESOPHAGEAL: ICD-10-PCS | Performed by: THORACIC SURGERY (CARDIOTHORACIC VASCULAR SURGERY)

## 2022-07-28 PROCEDURE — C1894 INTRO/SHEATH, NON-LASER: HCPCS | Performed by: THORACIC SURGERY (CARDIOTHORACIC VASCULAR SURGERY)

## 2022-07-28 PROCEDURE — 33361 REPLACE AORTIC VALVE PERQ: CPT | Performed by: INTERNAL MEDICINE

## 2022-07-28 PROCEDURE — 25010000002 MORPHINE PER 10 MG: Performed by: THORACIC SURGERY (CARDIOTHORACIC VASCULAR SURGERY)

## 2022-07-28 PROCEDURE — P9100 PATHOGEN TEST FOR PLATELETS: HCPCS

## 2022-07-28 PROCEDURE — C1760 CLOSURE DEV, VASC: HCPCS | Performed by: THORACIC SURGERY (CARDIOTHORACIC VASCULAR SURGERY)

## 2022-07-28 PROCEDURE — 36430 TRANSFUSION BLD/BLD COMPNT: CPT

## 2022-07-28 PROCEDURE — 25010000002 PROTAMINE SULFATE PER 10 MG: Performed by: NURSE ANESTHETIST, CERTIFIED REGISTERED

## 2022-07-28 PROCEDURE — 25010000002 DEXAMETHASONE PER 1 MG: Performed by: NURSE ANESTHETIST, CERTIFIED REGISTERED

## 2022-07-28 PROCEDURE — 85347 COAGULATION TIME ACTIVATED: CPT

## 2022-07-28 PROCEDURE — 93005 ELECTROCARDIOGRAM TRACING: CPT | Performed by: STUDENT IN AN ORGANIZED HEALTH CARE EDUCATION/TRAINING PROGRAM

## 2022-07-28 PROCEDURE — 25010000002 HEPARIN (PORCINE) PER 1000 UNITS: Performed by: THORACIC SURGERY (CARDIOTHORACIC VASCULAR SURGERY)

## 2022-07-28 PROCEDURE — 25010000002 ONDANSETRON PER 1 MG: Performed by: NURSE ANESTHETIST, CERTIFIED REGISTERED

## 2022-07-28 PROCEDURE — 82330 ASSAY OF CALCIUM: CPT

## 2022-07-28 PROCEDURE — 25010000002 METHYLPREDNISOLONE PER 125 MG: Performed by: NURSE PRACTITIONER

## 2022-07-28 PROCEDURE — 85027 COMPLETE CBC AUTOMATED: CPT | Performed by: STUDENT IN AN ORGANIZED HEALTH CARE EDUCATION/TRAINING PROGRAM

## 2022-07-28 PROCEDURE — 02RF38Z REPLACEMENT OF AORTIC VALVE WITH ZOOPLASTIC TISSUE, PERCUTANEOUS APPROACH: ICD-10-PCS | Performed by: THORACIC SURGERY (CARDIOTHORACIC VASCULAR SURGERY)

## 2022-07-28 PROCEDURE — 84295 ASSAY OF SERUM SODIUM: CPT

## 2022-07-28 PROCEDURE — P9059 PLASMA, FRZ BETWEEN 8-24HOUR: HCPCS

## 2022-07-28 PROCEDURE — P9035 PLATELET PHERES LEUKOREDUCED: HCPCS

## 2022-07-28 PROCEDURE — C1889 IMPLANT/INSERT DEVICE, NOC: HCPCS | Performed by: THORACIC SURGERY (CARDIOTHORACIC VASCULAR SURGERY)

## 2022-07-28 PROCEDURE — 82803 BLOOD GASES ANY COMBINATION: CPT

## 2022-07-28 PROCEDURE — 85027 COMPLETE CBC AUTOMATED: CPT | Performed by: THORACIC SURGERY (CARDIOTHORACIC VASCULAR SURGERY)

## 2022-07-28 PROCEDURE — 33361 REPLACE AORTIC VALVE PERQ: CPT | Performed by: THORACIC SURGERY (CARDIOTHORACIC VASCULAR SURGERY)

## 2022-07-28 PROCEDURE — 80048 BASIC METABOLIC PNL TOTAL CA: CPT | Performed by: STUDENT IN AN ORGANIZED HEALTH CARE EDUCATION/TRAINING PROGRAM

## 2022-07-28 PROCEDURE — 25010000002 DIPHENHYDRAMINE PER 50 MG: Performed by: NURSE PRACTITIONER

## 2022-07-28 PROCEDURE — B41G1ZZ FLUOROSCOPY OF LEFT LOWER EXTREMITY ARTERIES USING LOW OSMOLAR CONTRAST: ICD-10-PCS | Performed by: THORACIC SURGERY (CARDIOTHORACIC VASCULAR SURGERY)

## 2022-07-28 PROCEDURE — C1729 CATH, DRAINAGE: HCPCS | Performed by: THORACIC SURGERY (CARDIOTHORACIC VASCULAR SURGERY)

## 2022-07-28 PROCEDURE — C1725 CATH, TRANSLUMIN NON-LASER: HCPCS | Performed by: THORACIC SURGERY (CARDIOTHORACIC VASCULAR SURGERY)

## 2022-07-28 PROCEDURE — 85014 HEMATOCRIT: CPT

## 2022-07-28 PROCEDURE — 25010000002 HEPARIN (PORCINE) PER 1000 UNITS: Performed by: NURSE ANESTHETIST, CERTIFIED REGISTERED

## 2022-07-28 PROCEDURE — 3E033GC INTRODUCTION OF OTHER THERAPEUTIC SUBSTANCE INTO PERIPHERAL VEIN, PERCUTANEOUS APPROACH: ICD-10-PCS | Performed by: INTERNAL MEDICINE

## 2022-07-28 DEVICE — VLV HEART TRNSCATH SAPIEN/COMMANDER 23MM: Type: IMPLANTABLE DEVICE | Site: AORTA | Status: FUNCTIONAL

## 2022-07-28 RX ORDER — DEXAMETHASONE SODIUM PHOSPHATE 4 MG/ML
INJECTION, SOLUTION INTRA-ARTICULAR; INTRALESIONAL; INTRAMUSCULAR; INTRAVENOUS; SOFT TISSUE AS NEEDED
Status: DISCONTINUED | OUTPATIENT
Start: 2022-07-28 | End: 2022-07-28 | Stop reason: SURG

## 2022-07-28 RX ORDER — DIPHENHYDRAMINE HYDROCHLORIDE 50 MG/ML
25 INJECTION INTRAMUSCULAR; INTRAVENOUS ONCE
Status: COMPLETED | OUTPATIENT
Start: 2022-07-28 | End: 2022-07-28

## 2022-07-28 RX ORDER — ONDANSETRON 4 MG/1
4 TABLET, FILM COATED ORAL EVERY 6 HOURS PRN
Status: DISCONTINUED | OUTPATIENT
Start: 2022-07-28 | End: 2022-07-30 | Stop reason: HOSPADM

## 2022-07-28 RX ORDER — ASPIRIN 81 MG/1
81 TABLET ORAL DAILY
Status: DISCONTINUED | OUTPATIENT
Start: 2022-07-29 | End: 2022-07-30 | Stop reason: HOSPADM

## 2022-07-28 RX ORDER — ETOMIDATE 2 MG/ML
INJECTION INTRAVENOUS AS NEEDED
Status: DISCONTINUED | OUTPATIENT
Start: 2022-07-28 | End: 2022-07-28 | Stop reason: SURG

## 2022-07-28 RX ORDER — SODIUM CHLORIDE 9 MG/ML
100 INJECTION, SOLUTION INTRAVENOUS CONTINUOUS
Status: ACTIVE | OUTPATIENT
Start: 2022-07-28 | End: 2022-07-28

## 2022-07-28 RX ORDER — NIFEDIPINE 60 MG/1
60 TABLET, EXTENDED RELEASE ORAL DAILY
Refills: 3 | Status: DISCONTINUED | OUTPATIENT
Start: 2022-07-29 | End: 2022-07-29

## 2022-07-28 RX ORDER — METHYLPREDNISOLONE SODIUM SUCCINATE 125 MG/2ML
125 INJECTION, POWDER, LYOPHILIZED, FOR SOLUTION INTRAMUSCULAR; INTRAVENOUS ONCE
Status: COMPLETED | OUTPATIENT
Start: 2022-07-28 | End: 2022-07-28

## 2022-07-28 RX ORDER — ACETAMINOPHEN 325 MG/1
650 TABLET ORAL EVERY 4 HOURS PRN
Status: DISCONTINUED | OUTPATIENT
Start: 2022-07-28 | End: 2022-07-30 | Stop reason: HOSPADM

## 2022-07-28 RX ORDER — SODIUM CHLORIDE 0.9 % (FLUSH) 0.9 %
10 SYRINGE (ML) INJECTION AS NEEDED
Status: DISCONTINUED | OUTPATIENT
Start: 2022-07-28 | End: 2022-07-28 | Stop reason: HOSPADM

## 2022-07-28 RX ORDER — SODIUM CHLORIDE 9 MG/ML
250 INJECTION, SOLUTION INTRAVENOUS ONCE AS NEEDED
Status: DISCONTINUED | OUTPATIENT
Start: 2022-07-28 | End: 2022-07-29

## 2022-07-28 RX ORDER — CHLORHEXIDINE GLUCONATE 500 MG/1
1 CLOTH TOPICAL EVERY 12 HOURS PRN
Status: DISCONTINUED | OUTPATIENT
Start: 2022-07-28 | End: 2022-07-28 | Stop reason: HOSPADM

## 2022-07-28 RX ORDER — CHLORHEXIDINE GLUCONATE 0.12 MG/ML
15 RINSE ORAL ONCE
Status: COMPLETED | OUTPATIENT
Start: 2022-07-28 | End: 2022-07-28

## 2022-07-28 RX ORDER — ATORVASTATIN CALCIUM 20 MG/1
20 TABLET, FILM COATED ORAL NIGHTLY
Refills: 1 | Status: DISCONTINUED | OUTPATIENT
Start: 2022-07-28 | End: 2022-07-30 | Stop reason: HOSPADM

## 2022-07-28 RX ORDER — LISINOPRIL 40 MG/1
40 TABLET ORAL
Refills: 1 | Status: DISCONTINUED | OUTPATIENT
Start: 2022-07-29 | End: 2022-07-29

## 2022-07-28 RX ORDER — SODIUM CHLORIDE 9 MG/ML
INJECTION, SOLUTION INTRAVENOUS AS NEEDED
Status: DISCONTINUED | OUTPATIENT
Start: 2022-07-28 | End: 2022-07-28 | Stop reason: HOSPADM

## 2022-07-28 RX ORDER — ONDANSETRON 2 MG/ML
INJECTION INTRAMUSCULAR; INTRAVENOUS AS NEEDED
Status: DISCONTINUED | OUTPATIENT
Start: 2022-07-28 | End: 2022-07-28 | Stop reason: SURG

## 2022-07-28 RX ORDER — NITROGLYCERIN 0.4 MG/1
0.4 TABLET SUBLINGUAL
Status: DISCONTINUED | OUTPATIENT
Start: 2022-07-28 | End: 2022-07-28 | Stop reason: HOSPADM

## 2022-07-28 RX ORDER — SODIUM CHLORIDE 0.9 % (FLUSH) 0.9 %
10 SYRINGE (ML) INJECTION EVERY 12 HOURS SCHEDULED
Status: DISCONTINUED | OUTPATIENT
Start: 2022-07-28 | End: 2022-07-28 | Stop reason: HOSPADM

## 2022-07-28 RX ORDER — HEPARIN SODIUM 1000 [USP'U]/ML
INJECTION, SOLUTION INTRAVENOUS; SUBCUTANEOUS AS NEEDED
Status: DISCONTINUED | OUTPATIENT
Start: 2022-07-28 | End: 2022-07-28 | Stop reason: SURG

## 2022-07-28 RX ORDER — PROTAMINE SULFATE 10 MG/ML
INJECTION, SOLUTION INTRAVENOUS AS NEEDED
Status: DISCONTINUED | OUTPATIENT
Start: 2022-07-28 | End: 2022-07-28 | Stop reason: SURG

## 2022-07-28 RX ORDER — ONDANSETRON 2 MG/ML
4 INJECTION INTRAMUSCULAR; INTRAVENOUS EVERY 6 HOURS PRN
Status: DISCONTINUED | OUTPATIENT
Start: 2022-07-28 | End: 2022-07-30 | Stop reason: HOSPADM

## 2022-07-28 RX ORDER — HYDROCODONE BITARTRATE AND ACETAMINOPHEN 5; 325 MG/1; MG/1
1 TABLET ORAL EVERY 6 HOURS PRN
Status: DISCONTINUED | OUTPATIENT
Start: 2022-07-28 | End: 2022-07-30 | Stop reason: HOSPADM

## 2022-07-28 RX ORDER — LIDOCAINE HYDROCHLORIDE 10 MG/ML
0.5 INJECTION, SOLUTION EPIDURAL; INFILTRATION; INTRACAUDAL; PERINEURAL ONCE AS NEEDED
Status: COMPLETED | OUTPATIENT
Start: 2022-07-28 | End: 2022-07-28

## 2022-07-28 RX ORDER — ROCURONIUM BROMIDE 10 MG/ML
INJECTION, SOLUTION INTRAVENOUS AS NEEDED
Status: DISCONTINUED | OUTPATIENT
Start: 2022-07-28 | End: 2022-07-28 | Stop reason: SURG

## 2022-07-28 RX ORDER — FAMOTIDINE 10 MG/ML
20 INJECTION, SOLUTION INTRAVENOUS EVERY 12 HOURS SCHEDULED
Status: DISCONTINUED | OUTPATIENT
Start: 2022-07-28 | End: 2022-07-30

## 2022-07-28 RX ORDER — SODIUM CHLORIDE, SODIUM LACTATE, POTASSIUM CHLORIDE, CALCIUM CHLORIDE 600; 310; 30; 20 MG/100ML; MG/100ML; MG/100ML; MG/100ML
9 INJECTION, SOLUTION INTRAVENOUS CONTINUOUS
Status: DISCONTINUED | OUTPATIENT
Start: 2022-07-28 | End: 2022-07-29

## 2022-07-28 RX ORDER — LIDOCAINE HYDROCHLORIDE 10 MG/ML
INJECTION, SOLUTION EPIDURAL; INFILTRATION; INTRACAUDAL; PERINEURAL AS NEEDED
Status: DISCONTINUED | OUTPATIENT
Start: 2022-07-28 | End: 2022-07-28 | Stop reason: SURG

## 2022-07-28 RX ORDER — LABETALOL HYDROCHLORIDE 5 MG/ML
10 INJECTION, SOLUTION INTRAVENOUS
Status: DISCONTINUED | OUTPATIENT
Start: 2022-07-28 | End: 2022-07-29

## 2022-07-28 RX ORDER — HYDRALAZINE HYDROCHLORIDE 20 MG/ML
10 INJECTION INTRAMUSCULAR; INTRAVENOUS EVERY 6 HOURS PRN
Status: DISCONTINUED | OUTPATIENT
Start: 2022-07-28 | End: 2022-07-29

## 2022-07-28 RX ORDER — MIDAZOLAM HYDROCHLORIDE 1 MG/ML
0.5 INJECTION INTRAMUSCULAR; INTRAVENOUS
Status: DISCONTINUED | OUTPATIENT
Start: 2022-07-28 | End: 2022-07-28 | Stop reason: HOSPADM

## 2022-07-28 RX ORDER — MORPHINE SULFATE 2 MG/ML
2 INJECTION, SOLUTION INTRAMUSCULAR; INTRAVENOUS EVERY 4 HOURS PRN
Status: DISCONTINUED | OUTPATIENT
Start: 2022-07-28 | End: 2022-07-30 | Stop reason: HOSPADM

## 2022-07-28 RX ORDER — FENTANYL CITRATE 50 UG/ML
INJECTION, SOLUTION INTRAMUSCULAR; INTRAVENOUS AS NEEDED
Status: DISCONTINUED | OUTPATIENT
Start: 2022-07-28 | End: 2022-07-28 | Stop reason: SURG

## 2022-07-28 RX ORDER — FAMOTIDINE 20 MG/1
20 TABLET, FILM COATED ORAL ONCE
Status: COMPLETED | OUTPATIENT
Start: 2022-07-28 | End: 2022-07-28

## 2022-07-28 RX ORDER — SODIUM CHLORIDE 9 MG/ML
INJECTION, SOLUTION INTRAVENOUS CONTINUOUS PRN
Status: DISCONTINUED | OUTPATIENT
Start: 2022-07-28 | End: 2022-07-28 | Stop reason: SURG

## 2022-07-28 RX ADMIN — HEPARIN SODIUM 8000 UNITS: 1000 INJECTION, SOLUTION INTRAVENOUS; SUBCUTANEOUS at 10:49

## 2022-07-28 RX ADMIN — ROCURONIUM BROMIDE 60 MG: 10 INJECTION, SOLUTION INTRAVENOUS at 10:29

## 2022-07-28 RX ADMIN — ETOMIDATE 20 MG: 2 INJECTION, SOLUTION INTRAVENOUS at 10:29

## 2022-07-28 RX ADMIN — FAMOTIDINE 20 MG: 10 INJECTION, SOLUTION INTRAVENOUS at 20:20

## 2022-07-28 RX ADMIN — HYDROCODONE BITARTRATE AND ACETAMINOPHEN 1 TABLET: 5; 325 TABLET ORAL at 16:45

## 2022-07-28 RX ADMIN — LIDOCAINE HYDROCHLORIDE 0.5 ML: 10 INJECTION, SOLUTION EPIDURAL; INFILTRATION; INTRACAUDAL; PERINEURAL at 08:14

## 2022-07-28 RX ADMIN — NICARDIPINE HYDROCHLORIDE 15 MG/HR: 2.5 INJECTION, SOLUTION INTRAVENOUS at 13:26

## 2022-07-28 RX ADMIN — METHYLPREDNISOLONE SODIUM SUCCINATE 125 MG: 125 INJECTION, POWDER, FOR SOLUTION INTRAMUSCULAR; INTRAVENOUS at 13:01

## 2022-07-28 RX ADMIN — FAMOTIDINE 20 MG: 10 INJECTION, SOLUTION INTRAVENOUS at 13:01

## 2022-07-28 RX ADMIN — ACETAMINOPHEN 650 MG: 325 TABLET ORAL at 22:11

## 2022-07-28 RX ADMIN — ONDANSETRON 4 MG: 2 INJECTION INTRAMUSCULAR; INTRAVENOUS at 10:31

## 2022-07-28 RX ADMIN — PHENYLEPHRINE HYDROCHLORIDE 0.2 MG: 10 INJECTION INTRAVENOUS at 10:45

## 2022-07-28 RX ADMIN — FENTANYL CITRATE 100 MCG: 50 INJECTION, SOLUTION INTRAMUSCULAR; INTRAVENOUS at 10:29

## 2022-07-28 RX ADMIN — DIPHENHYDRAMINE HYDROCHLORIDE 25 MG: 50 INJECTION, SOLUTION INTRAMUSCULAR; INTRAVENOUS at 13:54

## 2022-07-28 RX ADMIN — MORPHINE SULFATE 2 MG: 2 INJECTION, SOLUTION INTRAMUSCULAR; INTRAVENOUS at 20:20

## 2022-07-28 RX ADMIN — DIPHENHYDRAMINE HYDROCHLORIDE 25 MG: 50 INJECTION, SOLUTION INTRAMUSCULAR; INTRAVENOUS at 12:49

## 2022-07-28 RX ADMIN — NICARDIPINE HYDROCHLORIDE 15 MG/HR: 2.5 INJECTION, SOLUTION INTRAVENOUS at 15:22

## 2022-07-28 RX ADMIN — ROCURONIUM BROMIDE 20 MG: 10 INJECTION, SOLUTION INTRAVENOUS at 11:13

## 2022-07-28 RX ADMIN — LIDOCAINE HYDROCHLORIDE 60 MG: 10 INJECTION, SOLUTION EPIDURAL; INFILTRATION; INTRACAUDAL; PERINEURAL at 10:29

## 2022-07-28 RX ADMIN — MORPHINE SULFATE 2 MG: 2 INJECTION, SOLUTION INTRAMUSCULAR; INTRAVENOUS at 15:23

## 2022-07-28 RX ADMIN — MUPIROCIN 1 APPLICATION: 20 OINTMENT TOPICAL at 08:24

## 2022-07-28 RX ADMIN — SODIUM CHLORIDE, POTASSIUM CHLORIDE, SODIUM LACTATE AND CALCIUM CHLORIDE 9 ML/HR: 600; 310; 30; 20 INJECTION, SOLUTION INTRAVENOUS at 08:14

## 2022-07-28 RX ADMIN — DEXAMETHASONE SODIUM PHOSPHATE 8 MG: 4 INJECTION, SOLUTION INTRA-ARTICULAR; INTRALESIONAL; INTRAMUSCULAR; INTRAVENOUS; SOFT TISSUE at 10:31

## 2022-07-28 RX ADMIN — CHLORHEXIDINE GLUCONATE 0.12% ORAL RINSE 15 ML: 1.2 LIQUID ORAL at 08:25

## 2022-07-28 RX ADMIN — ANTI-INHIBITOR COAGULANT COMPLEX 2000 UNITS: KIT at 11:26

## 2022-07-28 RX ADMIN — PROTAMINE SULFATE 80 MG: 10 INJECTION, SOLUTION INTRAVENOUS at 11:20

## 2022-07-28 RX ADMIN — ATORVASTATIN CALCIUM 20 MG: 20 TABLET, FILM COATED ORAL at 20:20

## 2022-07-28 RX ADMIN — SODIUM CHLORIDE 100 ML/HR: 9 INJECTION, SOLUTION INTRAVENOUS at 13:01

## 2022-07-28 RX ADMIN — SUGAMMADEX 200 MG: 100 INJECTION, SOLUTION INTRAVENOUS at 11:29

## 2022-07-28 RX ADMIN — NICARDIPINE HYDROCHLORIDE 12.5 MG/HR: 2.5 INJECTION, SOLUTION INTRAVENOUS at 17:23

## 2022-07-28 RX ADMIN — FAMOTIDINE 20 MG: 20 TABLET ORAL at 08:24

## 2022-07-28 RX ADMIN — SODIUM CHLORIDE: 9 INJECTION, SOLUTION INTRAVENOUS at 10:21

## 2022-07-28 NOTE — ANESTHESIA PROCEDURE NOTES
Airway  Urgency: elective    Date/Time: 7/28/2022 10:31 AM  Airway not difficult    General Information and Staff    Patient location during procedure: OR  CRNA/CAA: Kit Lucero CRNA    Indications and Patient Condition  Indications for airway management: airway protection    Preoxygenated: yes  MILS not maintained throughout  Mask difficulty assessment: 1 - vent by mask    Final Airway Details  Final airway type: endotracheal airway      Successful airway: ETT  Cuffed: yes   Successful intubation technique: direct laryngoscopy  Endotracheal tube insertion site: oral  Blade: Berenice  Blade size: 3  ETT size (mm): 7.0  Cormack-Lehane Classification: grade I - full view of glottis  Placement verified by: chest auscultation and capnometry   Measured from: lips  ETT/EBT  to lips (cm): 21  Number of attempts at approach: 1  Assessment: lips, teeth, and gum same as pre-op and atraumatic intubation    Additional Comments  Negative epigastric sounds, Breath sound equal bilaterally with symmetric chest rise and fall

## 2022-07-28 NOTE — ANESTHESIA PREPROCEDURE EVALUATION
Anesthesia Evaluation     Patient summary reviewed and Nursing notes reviewed   NPO Solid Status: > 8 hours  NPO Liquid Status: > 2 hours           Airway   Mallampati: II  TM distance: >3 FB  Neck ROM: full  No difficulty expected  Dental      Pulmonary     breath sounds clear to auscultation  Cardiovascular     ECG reviewed  Rhythm: regular  Rate: normal    (+) hypertension, valvular problems/murmurs AS, hyperlipidemia,       Neuro/Psych  GI/Hepatic/Renal/Endo    (+)   renal disease stones,     Musculoskeletal     Abdominal    Substance History      OB/GYN          Other   arthritis,      ROS/Med Hx Other: · Left ventricular wall thickness is consistent with mild concentric hypertrophy.  · Normal left ventricular systolic function, estimated EF 60%.  · Bicuspid aortic valve with severe aortic valve stenosis is present. Peak velocity 4 and 21 cm/s. Aortic annulus 2.3 cm, STJ 2.48 cm.  · Moderate aortic insufficiency.  · Trace mitral regurgitation.  · Trace tricuspid regurgitation.                       Anesthesia Plan    ASA 4     general     (Patricia, JUVENCIO)  intravenous induction     Anesthetic plan, risks, benefits, and alternatives have been provided, discussed and informed consent has been obtained with: patient.    Plan discussed with CRNA.        CODE STATUS:

## 2022-07-28 NOTE — ANESTHESIA POSTPROCEDURE EVALUATION
Patient: Greta Marino    Procedure Summary     Date: 07/28/22 Room / [facility] OR [facility]    Anesthesia Start: 1021 Anesthesia Stop: 1208    Procedures:       TRANSCATHETER AORTIC VALVE REPLACEMENT (N/A Chest)      TRANSESOPHAGEAL ECHOCARDIOGRAM (N/A Chest)      Transfemoral Transcatheter Aortic Valve Replacement (N/A ) Diagnosis:       Nonrheumatic aortic valve stenosis      (Nonrheumatic aortic valve stenosis [I35.0])    Surgeons: Jaylan Mckay MD; Dorian Camara MD Provider: Cj Munoz MD    Anesthesia Type: general ASA Status: 4          Anesthesia Type: general    Vitals  No vitals data found for the desired time range.          Post Anesthesia Care and Evaluation    Patient location during evaluation: ICU  Patient participation: complete - patient participated  Level of consciousness: responsive to verbal stimuli and sleepy but conscious  Pain score: 2  Pain management: adequate    Airway patency: patent  Anesthetic complications: No anesthetic complications  PONV Status: none  Cardiovascular status: hemodynamically stable and acceptable  Respiratory status: nonlabored ventilation, acceptable and nasal cannula  Hydration status: acceptable

## 2022-07-28 NOTE — ANESTHESIA PROCEDURE NOTES
Emergent/Open-Heart Anesthesia SOURAV    Procedure Performed: Emergent/Open-Heart Anesthesia SOURAV     Start Time:        End Time:        General Procedure Information  Physician Requesting Echo: Jaylan Mckay MD  Intubated  Bite block not placed  Heart visualized  Probe Insertion:  Easy  Probe Type:  Multiplane  Modalities:  Color flow mapping, continuous wave Doppler and pulse wave Doppler        Anesthesia Information      Echocardiogram Comments:       This is an abbreviated exam for TAVR procedure.  Post TAVR:  Initially there was a large pericardial effusion which was drained with minimal residual.  The AV opens and coapts well.  THere is no perrivalvular leak.  AVG mean of 6 mm Hg with CARLYLE of 1.5 cm2.

## 2022-07-28 NOTE — ANESTHESIA PROCEDURE NOTES
Arterial Line      Patient reassessed immediately prior to procedure    Patient location during procedure: pre-op   Line placed for hemodynamic monitoring.  Preanesthetic Checklist  Completed: patient identified, IV checked, site marked, risks and benefits discussed, surgical consent, monitors and equipment checked, pre-op evaluation and timeout performed  Arterial Line Prep   Sterile Tech: cap, gloves and sterile barriers  Prep: ChloraPrep  Patient monitoring: blood pressure monitoring, continuous pulse oximetry and EKG  Arterial Line Procedure   Laterality:right  Location:  radial artery  Catheter size: 20 G   Guidance: palpation technique  Number of attempts: 1  Successful placement: yes  Post Assessment   Dressing Type: line sutured, occlusive dressing applied, secured with tape and wrist guard applied.   Complications no  Circ/Move/Sens Assessment: normal and unchanged.   Patient Tolerance: patient tolerated the procedure well with no apparent complications

## 2022-07-29 ENCOUNTER — TRANSCRIBE ORDERS (OUTPATIENT)
Dept: CARDIAC REHAB | Facility: HOSPITAL | Age: 67
End: 2022-07-29

## 2022-07-29 DIAGNOSIS — Z95.2 S/P TAVR (TRANSCATHETER AORTIC VALVE REPLACEMENT): Primary | ICD-10-CM

## 2022-07-29 LAB
ACT BLD: 121 SECONDS (ref 82–152)
ACT BLD: 376 SECONDS (ref 82–152)
ANION GAP SERPL CALCULATED.3IONS-SCNC: 9 MMOL/L (ref 5–15)
BASE EXCESS BLDA CALC-SCNC: 4 MMOL/L (ref -5–5)
BH BB BLOOD EXPIRATION DATE: NORMAL
BH BB BLOOD TYPE BARCODE: 5100
BH BB BLOOD TYPE BARCODE: 600
BH BB BLOOD TYPE BARCODE: 600
BH BB BLOOD TYPE BARCODE: 6200
BH BB BLOOD TYPE BARCODE: 7300
BH BB DISPENSE STATUS: NORMAL
BH BB PRODUCT CODE: NORMAL
BH BB UNIT NUMBER: NORMAL
BUN SERPL-MCNC: 19 MG/DL (ref 8–23)
BUN/CREAT SERPL: 24.1 (ref 7–25)
CA-I BLDA-SCNC: 1.24 MMOL/L (ref 1.2–1.32)
CALCIUM SPEC-SCNC: 8.5 MG/DL (ref 8.6–10.5)
CHLORIDE SERPL-SCNC: 107 MMOL/L (ref 98–107)
CO2 BLDA-SCNC: 29 MMOL/L (ref 24–29)
CO2 SERPL-SCNC: 24 MMOL/L (ref 22–29)
CREAT SERPL-MCNC: 0.79 MG/DL (ref 0.57–1)
CROSSMATCH INTERPRETATION: NORMAL
CROSSMATCH INTERPRETATION: NORMAL
DEPRECATED RDW RBC AUTO: 42.7 FL (ref 37–54)
EGFRCR SERPLBLD CKD-EPI 2021: 82.6 ML/MIN/1.73
ERYTHROCYTE [DISTWIDTH] IN BLOOD BY AUTOMATED COUNT: 12.3 % (ref 12.3–15.4)
GLUCOSE BLDC GLUCOMTR-MCNC: 111 MG/DL (ref 70–130)
GLUCOSE SERPL-MCNC: 149 MG/DL (ref 65–99)
HCO3 BLDA-SCNC: 27.8 MMOL/L (ref 22–26)
HCT VFR BLD AUTO: 28.9 % (ref 34–46.6)
HCT VFR BLDA CALC: 37 % (ref 38–51)
HGB BLD-MCNC: 10 G/DL (ref 12–15.9)
HGB BLDA-MCNC: 12.6 G/DL (ref 12–17)
MAGNESIUM SERPL-MCNC: 2.1 MG/DL (ref 1.6–2.4)
MCH RBC QN AUTO: 32.7 PG (ref 26.6–33)
MCHC RBC AUTO-ENTMCNC: 34.6 G/DL (ref 31.5–35.7)
MCV RBC AUTO: 94.4 FL (ref 79–97)
PCO2 BLDA: 40.4 MM HG (ref 35–45)
PH BLDA: 7.44 PH UNITS (ref 7.35–7.6)
PLATELET # BLD AUTO: 211 10*3/MM3 (ref 140–450)
PMV BLD AUTO: 9.8 FL (ref 6–12)
PO2 BLDA: 437 MMHG (ref 80–105)
POTASSIUM BLDA-SCNC: 3.9 MMOL/L (ref 3.5–4.9)
POTASSIUM SERPL-SCNC: 4.3 MMOL/L (ref 3.5–5.2)
QT INTERVAL: 460 MS
QTC INTERVAL: 451 MS
RBC # BLD AUTO: 3.06 10*6/MM3 (ref 3.77–5.28)
SAO2 % BLDA: 100 % (ref 95–98)
SODIUM BLD-SCNC: 142 MMOL/L (ref 138–146)
SODIUM SERPL-SCNC: 140 MMOL/L (ref 136–145)
UNIT  ABO: NORMAL
UNIT  RH: NORMAL
WBC NRBC COR # BLD: 8.13 10*3/MM3 (ref 3.4–10.8)

## 2022-07-29 PROCEDURE — 80048 BASIC METABOLIC PNL TOTAL CA: CPT | Performed by: STUDENT IN AN ORGANIZED HEALTH CARE EDUCATION/TRAINING PROGRAM

## 2022-07-29 PROCEDURE — 99232 SBSQ HOSP IP/OBS MODERATE 35: CPT | Performed by: INTERNAL MEDICINE

## 2022-07-29 PROCEDURE — 85027 COMPLETE CBC AUTOMATED: CPT | Performed by: STUDENT IN AN ORGANIZED HEALTH CARE EDUCATION/TRAINING PROGRAM

## 2022-07-29 PROCEDURE — 83735 ASSAY OF MAGNESIUM: CPT | Performed by: THORACIC SURGERY (CARDIOTHORACIC VASCULAR SURGERY)

## 2022-07-29 PROCEDURE — 93005 ELECTROCARDIOGRAM TRACING: CPT | Performed by: STUDENT IN AN ORGANIZED HEALTH CARE EDUCATION/TRAINING PROGRAM

## 2022-07-29 RX ORDER — POTASSIUM CHLORIDE 1.5 G/1.77G
40 POWDER, FOR SOLUTION ORAL AS NEEDED
Status: DISCONTINUED | OUTPATIENT
Start: 2022-07-29 | End: 2022-07-30 | Stop reason: HOSPADM

## 2022-07-29 RX ORDER — POTASSIUM CHLORIDE 7.45 MG/ML
10 INJECTION INTRAVENOUS
Status: DISCONTINUED | OUTPATIENT
Start: 2022-07-29 | End: 2022-07-30 | Stop reason: HOSPADM

## 2022-07-29 RX ORDER — POTASSIUM CHLORIDE 750 MG/1
40 CAPSULE, EXTENDED RELEASE ORAL AS NEEDED
Status: DISCONTINUED | OUTPATIENT
Start: 2022-07-29 | End: 2022-07-30 | Stop reason: HOSPADM

## 2022-07-29 RX ORDER — MAGNESIUM SULFATE HEPTAHYDRATE 40 MG/ML
2 INJECTION, SOLUTION INTRAVENOUS AS NEEDED
Status: DISCONTINUED | OUTPATIENT
Start: 2022-07-29 | End: 2022-07-30 | Stop reason: HOSPADM

## 2022-07-29 RX ORDER — MAGNESIUM SULFATE HEPTAHYDRATE 40 MG/ML
4 INJECTION, SOLUTION INTRAVENOUS AS NEEDED
Status: DISCONTINUED | OUTPATIENT
Start: 2022-07-29 | End: 2022-07-30 | Stop reason: HOSPADM

## 2022-07-29 RX ADMIN — FAMOTIDINE 20 MG: 10 INJECTION, SOLUTION INTRAVENOUS at 20:00

## 2022-07-29 RX ADMIN — ASPIRIN 81 MG: 81 TABLET, COATED ORAL at 08:31

## 2022-07-29 RX ADMIN — FAMOTIDINE 20 MG: 10 INJECTION, SOLUTION INTRAVENOUS at 08:31

## 2022-07-29 RX ADMIN — ATORVASTATIN CALCIUM 20 MG: 20 TABLET, FILM COATED ORAL at 20:00

## 2022-07-30 ENCOUNTER — READMISSION MANAGEMENT (OUTPATIENT)
Dept: CALL CENTER | Facility: HOSPITAL | Age: 67
End: 2022-07-30

## 2022-07-30 VITALS
DIASTOLIC BLOOD PRESSURE: 73 MMHG | HEIGHT: 58 IN | BODY MASS INDEX: 26.03 KG/M2 | HEART RATE: 82 BPM | OXYGEN SATURATION: 100 % | RESPIRATION RATE: 16 BRPM | TEMPERATURE: 98.2 F | WEIGHT: 124 LBS | SYSTOLIC BLOOD PRESSURE: 137 MMHG

## 2022-07-30 PROCEDURE — 99232 SBSQ HOSP IP/OBS MODERATE 35: CPT | Performed by: INTERNAL MEDICINE

## 2022-07-30 PROCEDURE — 93005 ELECTROCARDIOGRAM TRACING: CPT | Performed by: STUDENT IN AN ORGANIZED HEALTH CARE EDUCATION/TRAINING PROGRAM

## 2022-07-30 PROCEDURE — 99239 HOSP IP/OBS DSCHRG MGMT >30: CPT

## 2022-07-30 PROCEDURE — 93010 ELECTROCARDIOGRAM REPORT: CPT | Performed by: INTERNAL MEDICINE

## 2022-07-30 RX ORDER — FAMOTIDINE 20 MG/1
20 TABLET, FILM COATED ORAL
Status: DISCONTINUED | OUTPATIENT
Start: 2022-07-30 | End: 2022-07-30 | Stop reason: HOSPADM

## 2022-07-30 RX ORDER — NIFEDIPINE 60 MG/1
60 TABLET, EXTENDED RELEASE ORAL DAILY
Status: DISCONTINUED | OUTPATIENT
Start: 2022-07-30 | End: 2022-07-30 | Stop reason: HOSPADM

## 2022-07-30 RX ORDER — ASPIRIN 81 MG/1
81 TABLET ORAL DAILY
Qty: 100 TABLET | Refills: 3 | Status: SHIPPED | OUTPATIENT
Start: 2022-07-30

## 2022-07-30 RX ORDER — LISINOPRIL 40 MG/1
40 TABLET ORAL
Status: DISCONTINUED | OUTPATIENT
Start: 2022-07-30 | End: 2022-07-30 | Stop reason: HOSPADM

## 2022-07-30 RX ADMIN — FAMOTIDINE 20 MG: 10 INJECTION, SOLUTION INTRAVENOUS at 08:04

## 2022-07-30 RX ADMIN — ASPIRIN 81 MG: 81 TABLET, COATED ORAL at 08:04

## 2022-07-30 NOTE — OUTREACH NOTE
Prep Survey    Flowsheet Row Responses   [facility] facility patient discharged from? [place]   Is LACE score < 7 ? Yes   Emergency Room discharge w/ pulse ox? No   Eligibility [facility]   Date of Admission 07/28/22   Date of Discharge 07/30/22   Discharge Disposition Home or Self Care   Discharge diagnosis Nonrheumatic aortic valve stenosis- Replacement this visit   Does the patient have one of the following disease processes/diagnoses(primary or secondary)? Other   Does the patient have Home health ordered? No   Is there a DME ordered? No   Prep survey completed? Yes          VINCE AVERY - Registered Nurse

## 2022-07-31 LAB
QT INTERVAL: 412 MS
QTC INTERVAL: 435 MS

## 2022-08-01 ENCOUNTER — TRANSITIONAL CARE MANAGEMENT TELEPHONE ENCOUNTER (OUTPATIENT)
Dept: CALL CENTER | Facility: HOSPITAL | Age: 67
End: 2022-08-01

## 2022-08-01 NOTE — OUTREACH NOTE
Call Center TCM Note    Flowsheet Row Responses   [facility] patient discharged from? [place]   Does the patient have one of the following disease processes/diagnoses(primary or secondary)? Other   TCM attempt successful? Yes   Call start time 1230   Call end time 1233   Discharge diagnosis Nonrheumatic aortic valve stenosis- Replacement this visit   Meds reviewed with patient/caregiver? Yes   Is the patient having any side effects they believe may be caused by any medication additions or changes? No   Does the patient have all medications ordered at discharge? Yes   Is the patient taking all medications as directed (includes completed medication regime)? Yes   Does the patient have a primary care provider?  Yes   Does the patient have an appointment with their PCP within 7 days of discharge? Yes   Comments regarding PCP Hospital d/c f/u appt is on 8/15/22 at 8:00 am    Has the patient kept scheduled appointments due by today? N/A   Psychosocial issues? No   Did the patient receive a copy of their discharge instructions? Yes   Nursing interventions Reviewed instructions with patient   What is the patient's perception of their health status since discharge? Improving   Is the patient/caregiver able to teach back signs and symptoms related to disease process for when to call PCP? Yes   Is the patient/caregiver able to teach back signs and symptoms related to disease process for when to call 911? Yes   Is the patient/caregiver able to teach back the hierarchy of who to call/visit for symptoms/problems? PCP, Specialist, Home health nurse, Urgent Care, ED, 911 Yes   If the patient is a current smoker, are they able to teach back resources for cessation? Not a smoker   TCM call completed? Yes          Dede Ca RN    8/1/2022, 12:33 EDT

## 2022-08-05 NOTE — DISCHARGE SUMMARY
"  Date of Discharge:  8/5/2022    Discharge Diagnosis: Valvular heart disease status post TAVR, cardiac tamponade and, coronary artery disease, cardiac risk factors    Presenting Problem/History of Present Illness  Nonrheumatic aortic valve stenosis [I35.0]    Patient is a 67-year-old female who presented to [facility] as an outpatient for transcatheter aortic valve replacement.    Hospital Course  Patient is a 67 y.o. female presented with severe aortic stenosis for TAVR with Dr. Mckay.  Intraoperatively the patient developed large pericardial effusion with evidence of cardiac tamponade.  Hemopericardium was aspirated and transferred to the ICU with pericardial tube in place.  Pericardial drain was removed on 7/30/2022 and the patient was discharged home in stable condition.    Procedures Performed  Procedure(s):  TRANSCATHETER AORTIC VALVE REPLACEMENT  TRANSESOPHAGEAL ECHOCARDIOGRAM  Transfemoral Transcatheter Aortic Valve Replacement       Consults:   Consults     No orders found from 6/29/2022 to 7/29/2022.          Echo EF Estimated  Lab Results   Component Value Date    ECHOEFEST 60 06/30/2022       Condition on Discharge: Stable    Physical Exam at Discharge    Vital Signs    Blood pressure 153/86, pulse 71, temperature 98.2 °F (36.8 °C), temperature source Oral, resp. rate 16, height 147.3 cm (58\"), weight 56.2 kg (124 lb), SpO2 97 %.     Physical Exam at discharge:  General: No apparent distress.  Neck: no JVD.  Chest:No respiratory distress, breath sounds are normal. No wheezes,  rhonchi or rales.  Cardiovascular: Normal S1 and S2, faint murmur.  Extremities: No edema.  Both groins intact no bleeding or hematoma.    Discharge Disposition  Home or Self Care    Discharge Medications     Discharge Medications      New Medications      Instructions Start Date   Adult Aspirin Regimen 81 MG EC tablet  Generic drug: aspirin   81 mg, Oral, Daily         Continue These Medications      Instructions " Start Date   benazepril 40 MG tablet  Commonly known as: LOTENSIN   40 mg, Oral, Daily      NIFEdipine CC 60 MG 24 hr tablet  Commonly known as: ADALAT CC   60 mg, Oral, Daily      simvastatin 40 MG tablet  Commonly known as: ZOCOR   40 mg, Oral, Nightly      vitamin B-12 1000 MCG tablet  Commonly known as: CYANOCOBALAMIN   1,000 mcg, Oral, Daily      Vitamin D3 10 MCG (400 UNIT) capsule   4,000 Units, Oral, Daily             Discharge Diet: Cardiac    Activity at Discharge: As tolerated    Follow-up Appointments  Future Appointments   Date Time Provider Department Center   8/8/2022 10:45 AM Jaquelin Dominique APRN [facility]   8/15/2022  8:00 AM Jessica Draper PA-C [facility]   8/16/2022  1:00 PM ORIENTATION -  [facility]   8/23/2022 11:00 AM [facility] [place] [facility]   8/29/2022  2:30 PM Grecia Lima APRN [facility]   8/30/2022 10:30 AM Shahid Lopez MD [facility]   9/12/2022  2:30 PM Karma Biggs APRN [facility]   10/27/2022  8:45 AM Alia Vasquez APRN [facility]         Test Results Pending at Discharge: None       Katie Chakraborty PA-C  08/05/22  14:40 EDT    Time: Discharge > 30 min

## 2022-08-08 ENCOUNTER — TELEMEDICINE (OUTPATIENT)
Dept: CARDIOLOGY | Facility: HOSPITAL | Age: 67
End: 2022-08-08

## 2022-08-08 VITALS
DIASTOLIC BLOOD PRESSURE: 66 MMHG | HEART RATE: 59 BPM | SYSTOLIC BLOOD PRESSURE: 113 MMHG | WEIGHT: 120.6 LBS | BODY MASS INDEX: 25.31 KG/M2 | HEIGHT: 58 IN

## 2022-08-08 DIAGNOSIS — I35.0 NONRHEUMATIC AORTIC VALVE STENOSIS: Primary | ICD-10-CM

## 2022-08-08 DIAGNOSIS — N13.5 URETEROPELVIC JUNCTION (UPJ) OBSTRUCTION: ICD-10-CM

## 2022-08-08 DIAGNOSIS — I31.39 PERICARDIAL EFFUSION: ICD-10-CM

## 2022-08-08 DIAGNOSIS — I10 ESSENTIAL HYPERTENSION: ICD-10-CM

## 2022-08-08 NOTE — PROGRESS NOTES
"[facility]  [facility]  Telemedicine note    08/08/2022       Greta Marino  [address] [facility] [address]    1955    Alia Vasquez APRN    Greta Marino is a 67 y.o. female.      Subjective:     Chief Complaint:  Cardiac Valve Problem (TAVR 7/28/22)       This was an telephone enabled telemedicine encounter. You have chosen to receive care through a telephone visit. Do you consent to use a telephone visit for your medical care today? Yes    Ms. Marino follows up after TAVR 7/28/22. There was post valve implant pericardial effusion that was managed with pericardial drain.  Patient was observed an extra day inpatient.       Patient states she feels good, just gets tired after being active about 20 min.  Symptoms resolve with a short rest.  She also relates seeing \"floaters\" at least daily since TAVR procedure.  She denies visual deficits, dizziness, weakness, or significant groin site pain.         Patient Active Problem List   Diagnosis   • Essential hypertension   • Mixed hyperlipidemia   • Arthritis   • Aortic valve disorder   • Ureteropelvic junction (UPJ) obstruction   • Nephrolithiasis   • Sleep apnea   • Pericardial effusion (S/P Drain)       Past Medical History:   Diagnosis Date   • Arthritis    • Asthma    • Coronary artery disease    • Heart murmur    • Heart valve disease    • History of mammography, screening 2017    Done in [place]    • History of Papanicolaou smear of cervix 2017    Done in [place]   • History of shingles 2015   • Hyperlipidemia    • Hypertension    • Kidney stones    • Non-functioning kidney     RIGHT   • Pancreatic cyst    • Sleep apnea    • Wears glasses        Past Surgical History:   Procedure Laterality Date   • ANKLE SURGERY Left 1980's    ORIF   • AORTIC VALVE REPAIR/REPLACEMENT N/A 7/28/2022    Procedure: TRANSCATHETER AORTIC VALVE REPLACEMENT;  Surgeon: Jaylan Mckay MD;  Location: [facility];  Service: " Cardiothoracic;  Laterality: N/A;  flouro 150  dose 16ML  contrast 102 MgY   • AORTIC VALVE REPAIR/REPLACEMENT N/A 7/28/2022    Procedure: Transfemoral Transcatheter Aortic Valve Replacement;  Surgeon: Dorian Camara MD;  Location: [facility];  Service: Cardiovascular;  Laterality: N/A;   • BREAST BIOPSY Right 1995   • CARDIAC CATHETERIZATION Left 06/30/2022    Procedure: Left Heart Cath;  Surgeon: Jaylan Redd MD;  Location: [facility] CATH INVASIVE LOCATION;  Service: Cardiovascular;  Laterality: Left;  to be scheduled in next 1-2 months   • COLONOSCOPY  8/24/2020, 2005    8/24/2020- Dr. Bowling. 2005-Dr. Spence in [place], [place]   • KNEE SURGERY Right 12/20/2018    meniscus repair   • TRANSESOPHAGEAL ECHOCARDIOGRAM (SOURAV)     • TRANSESOPHAGEAL ECHOCARDIOGRAM (SOURAV) N/A 7/28/2022    Procedure: TRANSESOPHAGEAL ECHOCARDIOGRAM;  Surgeon: Jaylan Mckay MD;  Location: [facility];  Service: Cardiothoracic;  Laterality: N/A;       Family History   Problem Relation Age of Onset   • Pancreatic cancer Mother         Passed away in 2002   • Arthritis Mother    • Cancer Mother         Pancreatic cancer   • Diabetes Father    • Heart attack Father         passed in 2000   • Hypertension Father    • Colon cancer Father    • Cancer Father         Colon cancer   • Diabetes Brother         Type 2   • Hypertension Brother    • Hypertension Brother    • Stroke Brother    • Sarcoidosis Daughter    • Breast cancer Neg Hx    • Ovarian cancer Neg Hx        Social History     Socioeconomic History   • Marital status:    • Number of children: 2   • Highest education level: High school graduate   Tobacco Use   • Smoking status: Never Smoker   • Smokeless tobacco: Never Used   Vaping Use   • Vaping Use: Never used   Substance and Sexual Activity   • Alcohol use: Yes     Comment: on occasion 1 glass per month   • Drug use: Never   • Sexual activity: Not Currently     Partners: Male     Birth control/protection: None  "    Comment:        Allergies   Allergen Reactions   • Peanut-Containing Drug Products Hives         Current Outpatient Medications:   •  aspirin 81 MG EC tablet, Take 1 tablet by mouth Daily., Disp: 100 tablet, Rfl: 3  •  benazepril (LOTENSIN) 40 MG tablet, Take 1 tablet by mouth Daily., Disp: 90 tablet, Rfl: 1  •  Cholecalciferol (Vitamin D3) 10 MCG (400 UNIT) capsule, Take 4,000 Units by mouth Daily., Disp: , Rfl:   •  NIFEdipine CC (ADALAT CC) 60 MG 24 hr tablet, Take 1 tablet by mouth Daily., Disp: 90 tablet, Rfl: 3  •  simvastatin (ZOCOR) 40 MG tablet, Take 1 tablet by mouth Every Night., Disp: 90 tablet, Rfl: 1  •  vitamin B-12 (CYANOCOBALAMIN) 1000 MCG tablet, Take 1,000 mcg by mouth Daily., Disp: , Rfl:   No current facility-administered medications for this visit.    The following portions of the patient's history were reviewed today and updated as appropriate: allergies, current medications, past family history, past medical history, past social history, past surgical history and problem list     Review of Systems   Constitutional: Positive for malaise/fatigue and weight loss.   HENT: Negative.    Eyes: Positive for visual disturbance.        \"floaters\" but no visual deficit   Cardiovascular: Negative for chest pain, claudication, cyanosis, dyspnea on exertion, irregular heartbeat, leg swelling, near-syncope, orthopnea, palpitations, paroxysmal nocturnal dyspnea and syncope.   Respiratory: Negative for shortness of breath and sleep disturbances due to breathing.    Endocrine: Negative.    Hematologic/Lymphatic: Does not bruise/bleed easily.   Skin: Positive for color change.        Groin sites and pericardial drain sites all healing w/o erythema or significant ecchymosis   Musculoskeletal: Positive for back pain.        Some flank pain when bladder is full related to nephrolithiasis   Gastrointestinal: Negative.    Genitourinary: Positive for flank pain.        Right side related to known " "nephrolithiasis   Neurological: Negative for dizziness, light-headedness and loss of balance.   Psychiatric/Behavioral: Negative.    Allergic/Immunologic: Negative.        Objective:     Vitals:    08/08/22 1100   BP: 113/66   Pulse: 59   Weight: 54.7 kg (120 lb 9.6 oz)   Height: 147.3 cm (58\")       Body mass index is 25.21 kg/m².    Vitals reviewed.   Constitutional:       Appearance: Not in distress.   Pulmonary:      Effort: Pulmonary effort is normal.   Neurological:      Mental Status: Alert and oriented to person, place and time.         Lab and Diagnostic Review:  Discharge Summary by Katie Chakraborty PA-C (07/30/2022 10:50)      Assessment and Plan:   1. Nonrheumatic aortic valve stenosis s/p TAVR   - complicated by pericardial effusion requiring drain, blood product administration, and extra day observation  - steady recovery @ home  - ASA, statin  - Reviewed upcoming follow up arrangements:  Cardiac rehab 8/16/22, post op echo 8/23/22, CT Surgery [facility], and finally Cardiology follow up 9/12/22  - report any symptom changes (new SOA, dizziness, pain, etc)  - speak with CT Surgery regarding return to work status    2. Essential HTN  - @ goal  - continue benazepril and nifedipine as is    3. Nephrolithiasis  - Urology follow up 8/30/22 to make plans for intervention        This visit has been scheduled as a telephone visit. Total time of discussion was 12 minutes.      It has been a pleasure to participate in the care of this patient.  Patient was instructed to call the [facility]ve Center with any questions, concerns, or worsening symptoms.      "

## 2022-08-09 LAB
BH CV ECHO MEAS - AO MAX PG: 10 MMHG
BH CV ECHO MEAS - AO MEAN PG: 4.6 MMHG
BH CV ECHO MEAS - AO V2 MAX: 158 CM/SEC
BH CV ECHO MEAS - AO V2 VTI: 30 CM
BH CV ECHO MEAS - LV MAX PG: 5.8 MMHG
BH CV ECHO MEAS - LV MEAN PG: 2.46 MMHG
BH CV ECHO MEAS - LV V1 MAX: 120.4 CM/SEC
BH CV ECHO MEAS - LV V1 VTI: 22.1 CM
BH CV ECHO MEAS - LVOT DIAM: 1.9 CM
LV EF 2D ECHO EST: 60 %

## 2022-08-15 ENCOUNTER — OFFICE VISIT (OUTPATIENT)
Dept: INTERNAL MEDICINE | Facility: CLINIC | Age: 67
End: 2022-08-15

## 2022-08-15 VITALS
BODY MASS INDEX: 25.86 KG/M2 | HEIGHT: 58 IN | TEMPERATURE: 96.8 F | OXYGEN SATURATION: 100 % | HEART RATE: 80 BPM | DIASTOLIC BLOOD PRESSURE: 56 MMHG | WEIGHT: 123.2 LBS | SYSTOLIC BLOOD PRESSURE: 102 MMHG

## 2022-08-15 DIAGNOSIS — Z95.2 HISTORY OF TRANSCATHETER AORTIC VALVE REPLACEMENT (TAVR): ICD-10-CM

## 2022-08-15 DIAGNOSIS — K86.2 PANCREATIC CYST: ICD-10-CM

## 2022-08-15 DIAGNOSIS — I10 ESSENTIAL HYPERTENSION: Primary | ICD-10-CM

## 2022-08-15 DIAGNOSIS — N20.0 NEPHROLITHIASIS: ICD-10-CM

## 2022-08-15 DIAGNOSIS — E78.2 MIXED HYPERLIPIDEMIA: ICD-10-CM

## 2022-08-15 PROCEDURE — 99214 OFFICE O/P EST MOD 30 MIN: CPT | Performed by: PHYSICIAN ASSISTANT

## 2022-08-15 NOTE — PROGRESS NOTES
Transitional Care Follow Up Visit  Subjective     Greta Marino is a 67 y.o. female who presents for a transitional care management visit.    Within 48 business hours after discharge our office contacted her via telephone to coordinate her care and needs.      I reviewed and discussed the details of that call along with the discharge summary, hospital problems, inpatient lab results, inpatient diagnostic studies, and consultation reports with Greta.     Current outpatient and discharge medications have been reconciled for the patient.  Reviewed by: Jessica Draper PA-C      Date of TCM Phone Call 7/30/2022   [facility]   Date of Admission 7/28/2022   Date of Discharge 7/30/2022   Discharge Disposition Home or Self Care     Risk for Readmission (LACE) No data recorded    Hx TAVR:  D/t nonrheumatic aortic valve stenosis dx in 2005 when she developed a new murmur.   Feels huge improvements since surgery 2 weeks ago. Prior to sx she had DUNCAN and heart fluttering with presyncope. Surgery was complicated by pericardial effusion requiring drain an extra day observation in the hospital.  Starts cardiac rehab tomorrow.     HTN:  Has been consistently taking BP meds Nifedipine 60 and Benazepril 40 as directed since last visit. Denies any medication SE's. Checks BP at home and it is around 120s/60s. She does not have any dizziness/diaphoresis, no CP. No edema.      HLD:  chronic; currently taking Simvastatin 40 mg daily.  Denies med SE's.  ASA 81 added after TAVR.   Lab Results       Component                Value               Date                       CHOL                     126                 06/30/2022                 CHLPL                    123                 03/24/2021                 TRIG                     80                  06/30/2022                 HDL                      40                  06/30/2022                 LDL                      70                  06/30/2022              Renal  Calculi:  Followed by urology    Pancreatic cysts:  Incidentally noted on recent CT, was referred to GI.        The following portions of the patient's history were reviewed and updated as appropriate: allergies, current medications, past family history, past medical history, past social history, past surgical history, and problem list.    Allergies   Allergen Reactions   • Peanut-Containing Drug Products Hives     Current Outpatient Medications on File Prior to Visit   Medication Sig Dispense Refill   • aspirin 81 MG EC tablet Take 1 tablet by mouth Daily. 100 tablet 3   • benazepril (LOTENSIN) 40 MG tablet Take 1 tablet by mouth Daily. 90 tablet 1   • Cholecalciferol (Vitamin D3) 10 MCG (400 UNIT) capsule Take 4,000 Units by mouth Daily.     • NIFEdipine CC (ADALAT CC) 60 MG 24 hr tablet Take 1 tablet by mouth Daily. 90 tablet 3   • simvastatin (ZOCOR) 40 MG tablet Take 1 tablet by mouth Every Night. 90 tablet 1   • vitamin B-12 (CYANOCOBALAMIN) 1000 MCG tablet Take 1,000 mcg by mouth Daily.       No current facility-administered medications on file prior to visit.     Current outpatient and discharge medications have been reconciled for the patient.  Reviewed by: Jessica Draper PA-C    No orders of the defined types were placed in this encounter.      Social History     Tobacco Use   Smoking Status Never Smoker   Smokeless Tobacco Never Used       Review of Systems   Constitutional: Negative for chills, fever and unexpected weight change.   Respiratory: Negative for cough, shortness of breath and wheezing.    Cardiovascular: Negative for chest pain and palpitations.   Gastrointestinal: Negative for abdominal pain, diarrhea and vomiting.   Neurological: Negative for dizziness, seizures and syncope.   Psychiatric/Behavioral: Negative for confusion.       Objective   Vitals:    08/15/22 0810   BP: 102/56   Pulse: 80   Temp: 96.8 °F (36 °C)   SpO2: 100%     Physical Exam  Vitals reviewed.   Constitutional:        General: She is not in acute distress.     Appearance: Normal appearance.   HENT:      Head: Normocephalic and atraumatic.   Eyes:      General: No scleral icterus.     Extraocular Movements: Extraocular movements intact.      Conjunctiva/sclera: Conjunctivae normal.   Cardiovascular:      Rate and Rhythm: Normal rate and regular rhythm.      Heart sounds: Murmur heard.   Pulmonary:      Effort: Pulmonary effort is normal. No respiratory distress.      Breath sounds: Normal breath sounds. No stridor. No wheezing or rhonchi.   Musculoskeletal:      Cervical back: Normal range of motion and neck supple.   Skin:     General: Skin is warm and dry.      Coloration: Skin is not jaundiced.   Neurological:      General: No focal deficit present.      Mental Status: She is alert and oriented to person, place, and time.      Gait: Gait normal.   Psychiatric:         Mood and Affect: Mood normal.         Behavior: Behavior normal.         Assessment & Plan   Diagnoses and all orders for this visit:    1. Essential hypertension (Primary)  Assessment & Plan:  Hypertension is improving with treatment.  Continue current treatment regimen.  Blood pressure will be reassessed at the next regular appointment.  Continue nifedipine and benazepril      2. History of transcatheter aortic valve replacement (TAVR)  Assessment & Plan:  Fu with cardio as dir, cont asa      3. Mixed hyperlipidemia  Assessment & Plan:  Lipid abnormalities are improving with treatment.  Pharmacotherapy as ordered.  Lipids will be reassessed at next f/u appt.  Continue Zocor      4. Nephrolithiasis  Assessment & Plan:  F/u with urology as dir      5. Pancreatic cyst  Assessment & Plan:  F/u with GI              Follow up if symptoms worsen or persist or has new or concerning symptoms, go to ER for severe symptoms.   Reviewed common medication effects and side effects and advised to report side effects immediately, the patient expressed good  understanding.  Encouraged medication compliance and the importance of keeping scheduled follow up appointments with me and any other providers    Return for Next scheduled follow up.    Jessica Draper PA-C    * Please note that portions of this note were completed with a voice recognition program.

## 2022-08-16 ENCOUNTER — TREATMENT (OUTPATIENT)
Dept: CARDIAC REHAB | Facility: HOSPITAL | Age: 67
End: 2022-08-16

## 2022-08-16 DIAGNOSIS — Z95.2 S/P TAVR (TRANSCATHETER AORTIC VALVE REPLACEMENT): ICD-10-CM

## 2022-08-16 PROCEDURE — 93798 PHYS/QHP OP CAR RHAB W/ECG: CPT

## 2022-08-16 NOTE — PROGRESS NOTES
Cardiac Rehab Initial Assessment      Name: Greta Marino  :1955 Allergies:Peanut-containing drug products   MRN: [id number] 67 y.o. Physician: Alia Vasquez APRN   Primary Diagnosis:    Diagnosis Plan   1. S/P TAVR (transcatheter aortic valve replacement)  Cardiac Rehab Phase II    Event Date: 22 Specialist: Dr. Redd   Secondary Diagnosis:  Risk Stratification:High Risk Note Author: Jame Wisdom RN     Cardiovascular History:      EXERCISE AT HOME  yes  60min  7 days per week    EF: 60%      Source: Echo 22          Ambulatory Status:Independent  Ambulatory Fall Risk Assessed on Initial Visit: yes 6 Minute Walk Pre- Cardiac Rehab:  Distance:1440ft      RPE:10  Max. HR: 109       SPO2:98    MET: 3                Resting BP: 120/82 LA, 122/80 RA    Peak BP: 136/82  Recovery BP: 120/82  Comments:       NUTRITION  Lipids:yes If yes, labs as follows;  Total: No components found for: CHOLESTEROL  HDL:   HDL Cholesterol   Date Value Ref Range Status   2022 40 40 - 60 mg/dL Final    Lipids continued:  LDL:  LDL Cholesterol    Date Value Ref Range Status   2022 70 0 - 100 mg/dL Final     LDL Chol Calc (NIH)   Date Value Ref Range Status   2021 71 0 - 100 mg/dL Final     Triglyceride: No components found for: TRIGLYCERIDE   Weight Management:                 Weight: 123.6lbs  Height: 57.99in                                   BMI: There is no height or weight on file to calculate BMI.    Alcohol Use: none Diabetes:No    Last HGBA1C with date if applicable:No components found for: A1C         SOCIAL HISTORY  Social History     Socioeconomic History   • Marital status:    • Number of children: 2   • Highest education level: High school graduate   Tobacco Use   • Smoking status: Never Smoker   • Smokeless tobacco: Never Used   Vaping Use   • Vaping Use: Never used   Substance and Sexual Activity   • Alcohol use: Yes     Comment: on occasion 1 glass per month   • Drug use:  Never   • Sexual activity: Not Currently     Partners: Male     Birth control/protection: None     Comment:        Educational Level (choose one that applies) high school diploma/GED Learning Barriers:Ready to Learn    Family Support:yes    Living Arrangement: lives with their son    Risk Factors: Stress  No, Clinical Depression  No, Heredity  Yes If Yes Father (MI), Brother (CVA), Hyperlipidemia  Yes, Diabetes  No If Yes: Do you check blood glucose daily  No Today's glucose level N/A, Exercise prior to event  Yes If Yes: Activity walking, Minutes per day60, Days per week 7 and Obesity  No     Tobacco Adjunct: No        Comorbidities: N/A     PSYCHOSOCIAL  Clinical Depression: no    Stress: no     Assess presence or absence of depression using a valid screening tool: yes      PHYSICAL ASSESSMENT  Influenza vaccine: yes  Pneumococcal vaccine: yes          Angina: no    Describe angina scale of 0 - 4: 0 = none    Today are you having incisional pain? No. If, Yes, Scale: 0        Today are you having any other pain? No. If, Yes, Scale: 0     Diagnosed with Hypertension:yes    Heart Sounds: Murmur     Lung Sounds: normal air entry, lungs clear to auscultation         Assessment:  Orthopedic Problems: None    Are you being hurt, hit, or frightened by anyone at home or in your life? no    Are you being neglected by a caregiver? No Shoulder flexibility/Range of motion: Average     Recommended arm activity: Any       Leg flexibility: Average    Chose one: Average    Recommended stretching: Standing    Assessment:     Family attends [place]: no Time of arrival: 1250  Time of departure: 1400     Patient Goals: To participate in 150 minutes weekly of aerobic exercise.         8/16/2022  14:17 EDT  Jame Wisdom RN

## 2022-08-17 ENCOUNTER — TREATMENT (OUTPATIENT)
Dept: CARDIAC REHAB | Facility: HOSPITAL | Age: 67
End: 2022-08-17

## 2022-08-17 DIAGNOSIS — Z95.2 S/P TAVR (TRANSCATHETER AORTIC VALVE REPLACEMENT): Primary | ICD-10-CM

## 2022-08-17 PROBLEM — K86.2 PANCREATIC CYST: Status: ACTIVE | Noted: 2022-08-17

## 2022-08-17 PROCEDURE — 93798 PHYS/QHP OP CAR RHAB W/ECG: CPT

## 2022-08-17 NOTE — ASSESSMENT & PLAN NOTE
Hypertension is improving with treatment.  Continue current treatment regimen.  Blood pressure will be reassessed at the next regular appointment.  Continue nifedipine and benazepril

## 2022-08-17 NOTE — ASSESSMENT & PLAN NOTE
Lipid abnormalities are improving with treatment.  Pharmacotherapy as ordered.  Lipids will be reassessed at next f/u appt.  Continue Zocor

## 2022-08-19 ENCOUNTER — TREATMENT (OUTPATIENT)
Dept: CARDIAC REHAB | Facility: HOSPITAL | Age: 67
End: 2022-08-19

## 2022-08-19 DIAGNOSIS — Z95.2 S/P TAVR (TRANSCATHETER AORTIC VALVE REPLACEMENT): Primary | ICD-10-CM

## 2022-08-19 PROCEDURE — 93798 PHYS/QHP OP CAR RHAB W/ECG: CPT

## 2022-08-22 ENCOUNTER — TREATMENT (OUTPATIENT)
Dept: CARDIAC REHAB | Facility: HOSPITAL | Age: 67
End: 2022-08-22

## 2022-08-22 DIAGNOSIS — Z95.2 S/P TAVR (TRANSCATHETER AORTIC VALVE REPLACEMENT): Primary | ICD-10-CM

## 2022-08-22 PROCEDURE — 93798 PHYS/QHP OP CAR RHAB W/ECG: CPT

## 2022-08-23 ENCOUNTER — HOSPITAL ENCOUNTER (OUTPATIENT)
Dept: CARDIOLOGY | Facility: HOSPITAL | Age: 67
Discharge: HOME | End: 2022-08-23
Admitting: NURSE PRACTITIONER

## 2022-08-23 VITALS
HEIGHT: 58 IN | WEIGHT: 121.25 LBS | DIASTOLIC BLOOD PRESSURE: 72 MMHG | SYSTOLIC BLOOD PRESSURE: 135 MMHG | BODY MASS INDEX: 25.45 KG/M2 | HEART RATE: 61 BPM

## 2022-08-23 DIAGNOSIS — I31.39 PERICARDIAL EFFUSION: ICD-10-CM

## 2022-08-23 DIAGNOSIS — I35.0 NONRHEUMATIC AORTIC VALVE STENOSIS: ICD-10-CM

## 2022-08-23 DIAGNOSIS — I35.9 AORTIC VALVE DISORDER: ICD-10-CM

## 2022-08-23 LAB
ASCENDING AORTA: 3.3 CM
BH CV ECHO MEAS - AO MAX PG: 40 MMHG
BH CV ECHO MEAS - AO MEAN PG: 20.7 MMHG
BH CV ECHO MEAS - AO ROOT DIAM: 2.4 CM
BH CV ECHO MEAS - AO V2 MAX: 315.7 CM/SEC
BH CV ECHO MEAS - AO V2 VTI: 68 CM
BH CV ECHO MEAS - AVA(I,D): 1.11 CM2
BH CV ECHO MEAS - EDV(CUBED): 66.9 ML
BH CV ECHO MEAS - EDV(MOD-SP2): 59 ML
BH CV ECHO MEAS - EDV(MOD-SP4): 59 ML
BH CV ECHO MEAS - EF(MOD-BP): 61 %
BH CV ECHO MEAS - EF(MOD-SP2): 59.3 %
BH CV ECHO MEAS - EF(MOD-SP4): 64.4 %
BH CV ECHO MEAS - ESV(CUBED): 17.2 ML
BH CV ECHO MEAS - ESV(MOD-SP2): 24 ML
BH CV ECHO MEAS - ESV(MOD-SP4): 21 ML
BH CV ECHO MEAS - FS: 36.5 %
BH CV ECHO MEAS - IVS/LVPW: 1.02 CM
BH CV ECHO MEAS - IVSD: 1.04 CM
BH CV ECHO MEAS - LA DIMENSION: 3.9 CM
BH CV ECHO MEAS - LAT PEAK E' VEL: 11.2 CM/SEC
BH CV ECHO MEAS - LV MASS(C)D: 135.6 GRAMS
BH CV ECHO MEAS - LV MAX PG: 6.2 MMHG
BH CV ECHO MEAS - LV MEAN PG: 3 MMHG
BH CV ECHO MEAS - LV V1 MAX: 124 CM/SEC
BH CV ECHO MEAS - LV V1 VTI: 26.7 CM
BH CV ECHO MEAS - LVIDD: 4.1 CM
BH CV ECHO MEAS - LVIDS: 2.6 CM
BH CV ECHO MEAS - LVOT AREA: 2.8 CM2
BH CV ECHO MEAS - LVOT DIAM: 1.9 CM
BH CV ECHO MEAS - LVPWD: 1.02 CM
BH CV ECHO MEAS - MED PEAK E' VEL: 7.5 CM/SEC
BH CV ECHO MEAS - MV A MAX VEL: 83.4 CM/SEC
BH CV ECHO MEAS - MV DEC SLOPE: 384 CM/SEC2
BH CV ECHO MEAS - MV DEC TIME: 0.24 MSEC
BH CV ECHO MEAS - MV E MAX VEL: 83.9 CM/SEC
BH CV ECHO MEAS - MV E/A: 1.01
BH CV ECHO MEAS - MV P1/2T: 80.1 MSEC
BH CV ECHO MEAS - MVA(P1/2T): 2.7 CM2
BH CV ECHO MEAS - PA ACC SLOPE: 514 CM/SEC2
BH CV ECHO MEAS - PA ACC TIME: 0.13 SEC
BH CV ECHO MEAS - PA PR(ACCEL): 21.9 MMHG
BH CV ECHO MEAS - SV(LVOT): 75.7 ML
BH CV ECHO MEAS - SV(MOD-SP2): 35 ML
BH CV ECHO MEAS - SV(MOD-SP4): 38 ML
BH CV ECHO MEAS - TAPSE (>1.6): 1.8 CM
BH CV ECHO MEAS - TR MAX PG: 20.7 MMHG
BH CV ECHO MEAS - TR MAX VEL: 227.3 CM/SEC
BH CV ECHO MEASUREMENTS AVERAGE E/E' RATIO: 8.97
BH CV VAS BP RIGHT ARM: NORMAL MMHG
BH CV XLRA - RV BASE: 3.4 CM
BH CV XLRA - RV LENGTH: 6.2 CM
BH CV XLRA - RV MID: 2.3 CM
BH CV XLRA - TDI S': 9.6 CM/SEC
IVRT: 79 MSEC
LEFT ATRIUM VOLUME INDEX: 36.3 ML/M2
LV EF 2D ECHO EST: 60 %
MAXIMAL PREDICTED HEART RATE: 154 BPM
STRESS TARGET HR: 131 BPM

## 2022-08-23 PROCEDURE — 93306 TTE W/DOPPLER COMPLETE: CPT

## 2022-08-23 PROCEDURE — 93306 TTE W/DOPPLER COMPLETE: CPT | Performed by: INTERNAL MEDICINE

## 2022-08-24 ENCOUNTER — TREATMENT (OUTPATIENT)
Dept: CARDIAC REHAB | Facility: HOSPITAL | Age: 67
End: 2022-08-24

## 2022-08-24 DIAGNOSIS — Z95.2 S/P TAVR (TRANSCATHETER AORTIC VALVE REPLACEMENT): Primary | ICD-10-CM

## 2022-08-24 PROCEDURE — 93798 PHYS/QHP OP CAR RHAB W/ECG: CPT

## 2022-08-26 ENCOUNTER — TELEPHONE (OUTPATIENT)
Dept: CARDIAC REHAB | Facility: HOSPITAL | Age: 67
End: 2022-08-26

## 2022-08-26 NOTE — TELEPHONE ENCOUNTER
Pt. Called to cancel cardiac rehab. Pt. Is not feeling well. Pt. States she is going to try and come back to her next scheduled session on 8/29/22.

## 2022-08-29 ENCOUNTER — TREATMENT (OUTPATIENT)
Dept: CARDIAC REHAB | Facility: HOSPITAL | Age: 67
End: 2022-08-29

## 2022-08-29 ENCOUNTER — CLINICAL SUPPORT (OUTPATIENT)
Dept: CARDIOLOGY | Facility: HOSPITAL | Age: 67
End: 2022-08-29

## 2022-08-29 ENCOUNTER — OFFICE VISIT (OUTPATIENT)
Dept: CARDIAC SURGERY | Facility: CLINIC | Age: 67
End: 2022-08-29

## 2022-08-29 VITALS
BODY MASS INDEX: 25.65 KG/M2 | TEMPERATURE: 98.3 F | SYSTOLIC BLOOD PRESSURE: 122 MMHG | WEIGHT: 122.2 LBS | HEART RATE: 86 BPM | DIASTOLIC BLOOD PRESSURE: 70 MMHG | HEIGHT: 58 IN | OXYGEN SATURATION: 99 %

## 2022-08-29 DIAGNOSIS — I50.32 CHRONIC DIASTOLIC (CONGESTIVE) HEART FAILURE: ICD-10-CM

## 2022-08-29 DIAGNOSIS — Z95.2 S/P TAVR (TRANSCATHETER AORTIC VALVE REPLACEMENT): Primary | ICD-10-CM

## 2022-08-29 DIAGNOSIS — I35.9 AORTIC VALVE DISORDER: ICD-10-CM

## 2022-08-29 DIAGNOSIS — I35.9 AORTIC VALVE DISORDER: Primary | ICD-10-CM

## 2022-08-29 PROCEDURE — 71046 X-RAY EXAM CHEST 2 VIEWS: CPT | Performed by: NURSE PRACTITIONER

## 2022-08-29 PROCEDURE — 93798 PHYS/QHP OP CAR RHAB W/ECG: CPT

## 2022-08-29 PROCEDURE — 99214 OFFICE O/P EST MOD 30 MIN: CPT | Performed by: NURSE PRACTITIONER

## 2022-08-29 RX ORDER — ACETAMINOPHEN 325 MG/1
325 TABLET ORAL EVERY 6 HOURS PRN
COMMUNITY
End: 2022-08-31

## 2022-08-29 NOTE — PROGRESS NOTES
Five Meter Walk Test    Greta Marino  1955  [id number]  08/29/22    Utilized Walking Aid? No     Walk 1: 5.30 s/5m     Walk 2: 4.48 s/5m     Walk 3: 4.18 s/5m    Five Meter Walk Average: 4.65 s/5m    Gait Speed: Normal (Average < or = 6 s/5m)    Annette Pride CMA, 08/29/22      KCCQ12 score today is 59/70 = NYHA class I HF symptoms.  Patient reports significant improvement in respiratory effort after TAVR.  Next TAVR clinic visit 8/2023 with echo.    Jaquelin SOUZA          [facility] CenterERE[facility] CenterK[facility] 8.[facility]    Answers for HPI/ROS submitted by the patient on 8/22/2022  Please describe your symptoms.: Post operation check  Have you had these symptoms before?: No  How long have you been having these symptoms?: 1-4 days  What is the primary reason for your visit?: Other

## 2022-08-29 NOTE — PROGRESS NOTES
"     [facility] Cardiothoracic Surgery Office Follow Up Note     Date of Encounter: 08/29/2022     YOB: 1955  Name: Greta Marino    PCP: Alia Vasquez APRN    Chief Complaint:    Chief Complaint   Patient presents with   • Hospital Follow Up Visit     Hosp follow up s/p TAVR 7/20/22- AGG- Severe aortic stenosis. Pt states that her incision look good, fatigue and SOB is about the same. Some lower back pain.        History of Present Illness:      Greta Marino is a 67 y.o. female with a history of Hypertension, hyperlipidemia, nephrolithiasis and severe aortic stenosis s/p TAVR with intraprocedural pericardial tamponade requiring pericardiocentesis with drain placement on 7/28/2022 with Dr. Mckay.  Patient presents today for post procedure standpoint.  Patient did require 4 units FFP post procedurally, however progressed well and discharged home following removal of drain/MT.  Since discharge, patient has been working closely with cardiac rehab and interested in returning to work.  She has undergone follow-up with TTE which was stable.  She notes a significant improvement in her preoperative shortness of breath/palpitations.  She does have some vision disturbance with a \"yellow dot\" in her vision field that was present preoperatively but has worsened since surgery.  Preop carotid duplex was negative.  Plans for urology appt tomorrow with renal stone.  Dr. Redd appt on 9/12/22.      Review of Systems:  Review of Systems   Constitutional: Positive for malaise/fatigue. Negative for chills, decreased appetite, diaphoresis, fever, night sweats and weight loss.   HENT: Negative for congestion, hoarse voice, sore throat and stridor.    Eyes: Positive for visual disturbance (floaters seen since surgery have increased ).   Cardiovascular: Positive for dyspnea on exertion. Negative for chest pain, claudication, irregular heartbeat, leg swelling, near-syncope, orthopnea, palpitations, " paroxysmal nocturnal dyspnea and syncope.   Respiratory: Negative for cough, hemoptysis, shortness of breath, sleep disturbances due to breathing, snoring, sputum production and wheezing.    Hematologic/Lymphatic: Negative for adenopathy and bleeding problem. Bruises/bleeds easily.   Skin: Negative for color change, dry skin, itching, poor wound healing and rash.   Musculoskeletal: Positive for arthritis and joint pain (bilteral knees and hands). Negative for back pain, falls and muscle weakness.   Gastrointestinal: Negative for abdominal pain, anorexia, constipation, diarrhea, hematochezia, melena, nausea and vomiting.   Genitourinary: Positive for hesitancy and nocturia.   Neurological: Negative for difficulty with concentration, disturbances in coordination, dizziness, loss of balance, numbness, seizures, vertigo and weakness.   Psychiatric/Behavioral: Positive for depression. Negative for altered mental status, memory loss and substance abuse. The patient has insomnia and is nervous/anxious.    Allergic/Immunologic: Negative for persistent infections.       Allergies:  Allergies   Allergen Reactions   • Peanut-Containing Drug Products Hives       Medications:      Current Outpatient Medications:   •  acetaminophen (Tylenol) 325 MG tablet, Take 325 mg by mouth Every 6 (Six) Hours As Needed for Mild Pain . PRN, Disp: , Rfl:   •  aspirin 81 MG EC tablet, Take 1 tablet by mouth Daily., Disp: 100 tablet, Rfl: 3  •  benazepril (LOTENSIN) 40 MG tablet, Take 1 tablet by mouth Daily., Disp: 90 tablet, Rfl: 1  •  Cholecalciferol (Vitamin D3) 10 MCG (400 UNIT) capsule, Take 4,000 Units by mouth Daily., Disp: , Rfl:   •  NIFEdipine CC (ADALAT CC) 60 MG 24 hr tablet, Take 1 tablet by mouth Daily., Disp: 90 tablet, Rfl: 3  •  simvastatin (ZOCOR) 40 MG tablet, Take 1 tablet by mouth Every Night., Disp: 90 tablet, Rfl: 1  •  vitamin B-12 (CYANOCOBALAMIN) 1000 MCG tablet, Take 1,000 mcg by mouth Daily., Disp: , Rfl:     Social  History     Socioeconomic History   • Marital status:    • Number of children: 2   • Highest education level: High school graduate   Tobacco Use   • Smoking status: Never Smoker   • Smokeless tobacco: Never Used   Vaping Use   • Vaping Use: Never used   Substance and Sexual Activity   • Alcohol use: Yes     Comment: on occasion 1 glass per month   • Drug use: Never   • Sexual activity: Not Currently     Partners: Male     Birth control/protection: None     Comment:        Family History   Problem Relation Age of Onset   • Pancreatic cancer Mother         Passed away in 2002   • Arthritis Mother    • Cancer Mother         Pancreatic cancer   • Diabetes Father    • Heart attack Father         passed in 2000   • Hypertension Father    • Colon cancer Father    • Cancer Father         Colon cancer   • Diabetes Brother         Type 2   • Hypertension Brother    • Hypertension Brother    • Stroke Brother    • Sarcoidosis Daughter    • Breast cancer Neg Hx    • Ovarian cancer Neg Hx        Past Medical History:   Diagnosis Date   • Arthritis    • Asthma    • Coronary artery disease    • Heart murmur    • Heart valve disease    • History of mammography, screening 2017    Done in [place]    • History of Papanicolaou smear of cervix 2017    Done in [place]   • History of shingles 2015   • Hyperlipidemia    • Hypertension    • Kidney stones    • Non-functioning kidney     RIGHT   • Pancreatic cyst    • Sleep apnea    • Wears glasses        Past Surgical History:   Procedure Laterality Date   • ANKLE SURGERY Left 1980's    ORIF   • AORTIC VALVE REPAIR/REPLACEMENT N/A 7/28/2022    Procedure: TRANSCATHETER AORTIC VALVE REPLACEMENT;  Surgeon: Jaylan Mckay MD;  Location: [facility];  Service: Cardiothoracic;  Laterality: N/A;  flouro 150  dose 16ML  contrast 102 MgY   • AORTIC VALVE REPAIR/REPLACEMENT N/A 7/28/2022    Procedure: Transfemoral Transcatheter Aortic Valve Replacement;  Surgeon: Jax  "MD Dorian;  Location: [facility];  Service: Cardiovascular;  Laterality: N/A;   • BREAST BIOPSY Right 1995   • CARDIAC CATHETERIZATION Left 06/30/2022    Procedure: Left Heart Cath;  Surgeon: Jaylan Redd MD;  Location: Replaced by [facility] CATH INVASIVE LOCATION;  Service: Cardiovascular;  Laterality: Left;  to be scheduled in next 1-2 months   • COLONOSCOPY  8/24/2020, 2005    8/24/2020- Dr. Bowling. 2005-Dr. Spence in [place], [place]   • KNEE SURGERY Right 12/20/2018    meniscus repair   • TRANSESOPHAGEAL ECHOCARDIOGRAM (SOURAV)     • TRANSESOPHAGEAL ECHOCARDIOGRAM (SOURAV) N/A 7/28/2022    Procedure: TRANSESOPHAGEAL ECHOCARDIOGRAM;  Surgeon: Jaylan Mckay MD;  Location: [facility];  Service: Cardiothoracic;  Laterality: N/A;       I have reviewed the following portions of the patient's history: allergies, current medications, past family history, past medical history, past social history, past surgical history and problem list and confirm it's accurate.    Physical Exam:  Vital Signs:    Vitals:    08/29/22 1501   BP: 122/70   Pulse: 86   Temp: 98.3 °F (36.8 °C)   SpO2: 99%   Weight: 55.4 kg (122 lb 3.2 oz)   Height: 147.3 cm (58\")     Body mass index is 25.54 kg/m².     Physical Exam  Vitals and nursing note reviewed.   Constitutional:       Appearance: Normal appearance. She is well-developed and well-groomed.   HENT:      Head: Normocephalic and atraumatic.   Cardiovascular:      Rate and Rhythm: Normal rate and regular rhythm.      Heart sounds: S1 normal and S2 normal. Murmur heard.    Systolic murmur is present.    No friction rub.   Pulmonary:      Comments: Unlabored, Clear to auscultation bilaterally  Musculoskeletal:      Cervical back: Neck supple.      Right lower leg: No edema.      Left lower leg: No edema.   Skin:     General: Skin is warm and dry.      Comments: Bilateral groin puncture sites intact/CT sites intact  No surrounding erythema, hematoma or induration   Neurological:      Mental " Status: She is alert and oriented to person, place, and time.   Psychiatric:         Attention and Perception: Attention normal.         Mood and Affect: Mood normal.         Speech: Speech normal.         Behavior: Behavior is cooperative.         Labs/Imaging:  Results for orders placed during the hospital encounter of 08/23/22    Adult Transthoracic Echo Complete W/ Cont if Necessary Per Protocol    Interpretation Summary  · Estimated left ventricular EF = 60%  · There is a TAVR valve present. Mild paravalvular regurgitation is present in the prosthetic aortic valve.  · Mild to moderate mitral valve regurgitation is present.        Results for orders placed during the hospital encounter of 03/25/22    Duplex Carotid Ultrasound CAR    Interpretation Summary  · Proximal right internal carotid artery is normal.  · Proximal left internal carotid artery is normal.    Chest x-ray in office today: Lungs fully expanded.  +TAVR.  No pleural effusion/PTX.  Personally reviewed.  Official radiology read pending    Time Spent: I spent 32 minutes caring for Greta on this date of service. This time includes time spent by me in the following activities: preparing for the visit, reviewing tests, obtaining and/or reviewing a separately obtained history, performing a medically appropriate examination and/or evaluation, counseling and educating the patient/family/caregiver, ordering medications, tests, or procedures, documenting information in the medical record and independently interpreting results and communicating that information with the patient/family/caregiver.     Assessment / Plan:  Diagnoses and all orders for this visit:    1. Aortic valve disorder     Greta Marino is a 67 y.o. female with a history of Hypertension, hyperlipidemia, nephrolithiasis and severe aortic stenosis s/p TAVR with intraprocedural pericardial tamponade requiring pericardiocentesis with drain placement on 7/28/2022 with Dr. Mckay.  Patient is  stable from a postoperative standpoint with stable bilateral groins and chest x-ray in office today.  Post procedure TTE with mild paravalvular regurg and mild to moderate MR.  Plans to follow-up with Dr. Redd's office on 9/12/22.  She is progressing well with cardiac rehab.  Appt with urology tomorrow for right renal stone and ongoing right flank pain.  She is stable to undergo urologic procedure from a CT surgical standpoint, but will need formal cardiac clearance from Dr. Redd's office.  We will release patient back to work with 25 pound weight restriction until 6 weeks and then 50 pound weight restriction.  At this point, we will plan to follow-up with patient on as-needed basis.    Noelle Lindsay, [facility] Cardiothoracic Surgery

## 2022-08-30 ENCOUNTER — OFFICE VISIT (OUTPATIENT)
Dept: UROLOGY | Facility: CLINIC | Age: 67
End: 2022-08-30

## 2022-08-30 VITALS — HEIGHT: 58 IN | BODY MASS INDEX: 25.61 KG/M2 | WEIGHT: 122 LBS

## 2022-08-30 DIAGNOSIS — N13.5 URETERAL OBSTRUCTION, RIGHT: Primary | ICD-10-CM

## 2022-08-30 PROCEDURE — 99214 OFFICE O/P EST MOD 30 MIN: CPT | Performed by: UROLOGY

## 2022-08-30 PROCEDURE — 87086 URINE CULTURE/COLONY COUNT: CPT | Performed by: UROLOGY

## 2022-08-31 ENCOUNTER — PREP FOR SURGERY (OUTPATIENT)
Dept: OTHER | Facility: HOSPITAL | Age: 67
End: 2022-08-31

## 2022-08-31 ENCOUNTER — TREATMENT (OUTPATIENT)
Dept: CARDIAC REHAB | Facility: HOSPITAL | Age: 67
End: 2022-08-31

## 2022-08-31 DIAGNOSIS — Z95.2 S/P TAVR (TRANSCATHETER AORTIC VALVE REPLACEMENT): Primary | ICD-10-CM

## 2022-08-31 DIAGNOSIS — N13.5 URETEROPELVIC JUNCTION (UPJ) OBSTRUCTION: Primary | ICD-10-CM

## 2022-08-31 LAB — BACTERIA SPEC AEROBE CULT: NO GROWTH

## 2022-08-31 PROCEDURE — 93798 PHYS/QHP OP CAR RHAB W/ECG: CPT

## 2022-08-31 RX ORDER — ACETAMINOPHEN 500 MG
1000 TABLET ORAL ONCE
Status: CANCELLED | OUTPATIENT
Start: 2022-08-31 | End: 2022-08-31

## 2022-08-31 RX ORDER — SCOLOPAMINE TRANSDERMAL SYSTEM 1 MG/1
1 PATCH, EXTENDED RELEASE TRANSDERMAL CONTINUOUS
Status: CANCELLED | OUTPATIENT
Start: 2022-08-31 | End: 2022-09-03

## 2022-08-31 RX ORDER — GABAPENTIN 300 MG/1
600 CAPSULE ORAL ONCE
Status: CANCELLED | OUTPATIENT
Start: 2022-08-31 | End: 2022-08-31

## 2022-08-31 RX ORDER — CEFAZOLIN SODIUM 2 G/100ML
2 INJECTION, SOLUTION INTRAVENOUS ONCE
Status: CANCELLED | OUTPATIENT
Start: 2022-08-31 | End: 2022-08-31

## 2022-08-31 RX ORDER — MELOXICAM 15 MG/1
15 TABLET ORAL ONCE
Status: CANCELLED | OUTPATIENT
Start: 2022-08-31 | End: 2022-08-31

## 2022-08-31 NOTE — PROGRESS NOTES
"CARDIAC/PULMONARY REHAB NUTRITION EDUCATION/ASSESSMENT      67 y.o.         Height: 58 in    Weight: 122 lb     BMI: 25.5 IBW:   lb     %IBW: ~100%      Adj IBW:  n/a               Time seen: 9:00 am - 30\"   Diet Survey Score: 58-  Making many healthy choices  Cardiac Risk Factors: mixed HLD, aortic valve disorder, HTN, s/p TAVR Weight Assessment: Normal wt for age  Weight Change:  unchanged  Usual Weight: 120-125 lb  Desired Weight: BMI <25 is appropriate for this individual     Current Diet: Generally healthful, better than average  Appetite: good   Factors limiting PO intake:  None  Taste/smell changes:  No Food records reviewed? Yes, extensive review of home diet today                                           Review of 'rate your plate' score     Occupation:  Sales @ [employer]    Job Activity Level:moderate, walking    Routine Exercise: moderate     Who does the patient live with: son and daughter-in-law; granddaughter 4yo  Who does the cooking at home: Greta and daughter-in-law  Spouse/significant other's name:  -  Spouse/significant other present for diet instruction today? Yes  Patient actively receiving lifestyle support from others at home? Yes       Pertinent Lab Values:   Total: No components found for: CHOLESTEROL   HDL:   HDL Cholesterol   Date Value Ref Range Status   06/30/2022 40 40 - 60 mg/dL Final     LDL:  LDL Cholesterol    Date Value Ref Range Status   06/30/2022 70 0 - 100 mg/dL Final     LDL Chol Calc (NIH)   Date Value Ref Range Status   03/24/2021 71 0 - 100 mg/dL Final     Triglyceride: No components found for: TRIGLYCERIDE  Last HGBA1C with date if applicable:No components found for: A1C  Glucose:   Glucose   Date Value Ref Range Status   07/29/2022 149 (H) 65 - 99 mg/dL Final    Nutritional Supplements: B-12, vit D    Pertinent Nutrition-Related Medications:  reviewed    Food allergy: peanuts  Never smoker      Home diet: generally 3 meals/day; occasional eating out; no fast " "foods. Hx vegan lifestyle when she lived in [place]. Continues with healthy habits. Very little beef/pork; mostly chicken/turkey/fish eaten by the family and herself. Avoids full fat dairy milk/cheeses, instead choosing almond milk. Good monounsaturated fat intake with olive oil, avocados, nuts. Low saturated fat. Monitors sodium labels and sodium intake. Beverage intake: water, coffee/hot tea.     Areas of improvement: likes sweets- candy corn recently; would like to cut back on this. Loves carbs- white bread, pasta, rice, etc. Could increase fiber content - discussed.      Stated Problem Areas / Concerns: sweets, watchful of sodium content     Assessment / Recommendations: Better than average home diet. Work on increasing fiber, decreasing sweets, continuing walking daily  Motivation level toward diet compliance: strong       Education: HS/GED  Previous cardiac diet education prior to coming to Cardiac Rehab?  No  Instructed on:  Cardiac diet, Lipid management,Label reading for heart health, mindful eating, sodium, soluble fiber  Written materials given:  yes         Goals:  Greta set the following goals for herself today:  1. Cut back on sugars/sweets, limit to occasionally  2. Ensure to get 20-30\" walking in daily; wear fitbit to assess steps taken                 09:32 EDT  8/31/2022  Mehreen Gallegos RD                        "

## 2022-09-02 ENCOUNTER — TREATMENT (OUTPATIENT)
Dept: CARDIAC REHAB | Facility: HOSPITAL | Age: 67
End: 2022-09-02

## 2022-09-02 DIAGNOSIS — Z95.2 S/P TAVR (TRANSCATHETER AORTIC VALVE REPLACEMENT): Primary | ICD-10-CM

## 2022-09-02 PROCEDURE — 93798 PHYS/QHP OP CAR RHAB W/ECG: CPT

## 2022-09-07 ENCOUNTER — TREATMENT (OUTPATIENT)
Dept: CARDIAC REHAB | Facility: HOSPITAL | Age: 67
End: 2022-09-07

## 2022-09-07 DIAGNOSIS — Z95.2 S/P TAVR (TRANSCATHETER AORTIC VALVE REPLACEMENT): Primary | ICD-10-CM

## 2022-09-07 PROCEDURE — 93798 PHYS/QHP OP CAR RHAB W/ECG: CPT

## 2022-09-07 NOTE — PROGRESS NOTES
Attended Phase II Cardiac Rehab. No medication or health history changes reported. See [facility] for details.

## 2022-09-09 ENCOUNTER — TREATMENT (OUTPATIENT)
Dept: CARDIAC REHAB | Facility: HOSPITAL | Age: 67
End: 2022-09-09

## 2022-09-09 DIAGNOSIS — Z95.2 S/P TAVR (TRANSCATHETER AORTIC VALVE REPLACEMENT): Primary | ICD-10-CM

## 2022-09-09 PROCEDURE — 93798 PHYS/QHP OP CAR RHAB W/ECG: CPT

## 2022-09-12 ENCOUNTER — OFFICE VISIT (OUTPATIENT)
Dept: CARDIOLOGY | Facility: CLINIC | Age: 67
End: 2022-09-12

## 2022-09-12 ENCOUNTER — TREATMENT (OUTPATIENT)
Dept: CARDIAC REHAB | Facility: HOSPITAL | Age: 67
End: 2022-09-12

## 2022-09-12 VITALS
BODY MASS INDEX: 26.03 KG/M2 | OXYGEN SATURATION: 97 % | DIASTOLIC BLOOD PRESSURE: 68 MMHG | WEIGHT: 124 LBS | HEART RATE: 84 BPM | SYSTOLIC BLOOD PRESSURE: 116 MMHG | HEIGHT: 58 IN

## 2022-09-12 DIAGNOSIS — Z01.810 PREOP CARDIOVASCULAR EXAM: ICD-10-CM

## 2022-09-12 DIAGNOSIS — I35.9 AORTIC VALVE DISORDER: Primary | ICD-10-CM

## 2022-09-12 DIAGNOSIS — E78.2 MIXED HYPERLIPIDEMIA: ICD-10-CM

## 2022-09-12 DIAGNOSIS — I10 ESSENTIAL HYPERTENSION: ICD-10-CM

## 2022-09-12 DIAGNOSIS — Z95.2 S/P TAVR (TRANSCATHETER AORTIC VALVE REPLACEMENT): Primary | ICD-10-CM

## 2022-09-12 PROCEDURE — 93798 PHYS/QHP OP CAR RHAB W/ECG: CPT

## 2022-09-12 PROCEDURE — 99214 OFFICE O/P EST MOD 30 MIN: CPT | Performed by: NURSE PRACTITIONER

## 2022-09-12 NOTE — PROGRESS NOTES
Subjective:     Encounter Date:09/12/2022    Primary Care Physician: Alia Vasquez APRN      Patient ID: Greta Marino is a 67 y.o. female.    Chief Complaint:Nonrheumatic aortic valve stenosis    PROBLEM LIST:  1. Aortic stenosis   a. Echocardiogram, 5/11/2016: EF 70%. LV has mild concentric hypertrophy. AV was trileaflet. Moderate stenosis mean 20-25 mmHg, max 45-50 mmHg. Moderate AR, MR, and TR. LA mildly dilated.   b. Echocardiogram, 8/17/2020: LVEF 66%. Moderate AI and severe AS with mean gradient 42 mmHg.  Mild MR and TR.  RVSP 23  mmHg. Grade 1 diastolic dysfunction\  c. Echocardiogram, 4/26/2021: EF 60%. Severe aortic valve stenosis is present. Aortic valve area is 0.8 cm2. AV max pressure gradient 76 mmHg, mean 39 mmHg. Mild to moderate AR. Mild MR.  d. 6/30/2022 LHC 40% RCA with normal IFR.  Otherwise normal coronaries.  e. 6/30/2022 SOURAV Dr. Shah EF 60%.  Bicuspid aortic valve with severe aortic valve stenosis.  f. 7/28/2022 TAVR 23 mm Nicolas PALMER III tissue valve  g. 8/23/2022 echo EF 60%.  TAVR valve present.  Mild perivalvular regurgitation.  Mild to moderate MR.  2. Hypertension  3. Hyperlipidemia.   4. Arthritis  5. Asthma  6. Nephrolithiasis   7. Surgeries:  a. Ankle surgery, left  b. Breast biopsy  c. Knee surgery, right      Allergies   Allergen Reactions   • Peanut-Containing Drug Products Hives         Current Outpatient Medications:   •  aspirin 81 MG EC tablet, Take 1 tablet by mouth Daily., Disp: 100 tablet, Rfl: 3  •  benazepril (LOTENSIN) 40 MG tablet, Take 1 tablet by mouth Daily., Disp: 90 tablet, Rfl: 1  •  Cholecalciferol (Vitamin D3) 10 MCG (400 UNIT) capsule, Take 4,000 Units by mouth Daily., Disp: , Rfl:   •  NIFEdipine CC (ADALAT CC) 60 MG 24 hr tablet, Take 1 tablet by mouth Daily., Disp: 90 tablet, Rfl: 3  •  simvastatin (ZOCOR) 40 MG tablet, Take 1 tablet by mouth Every Night., Disp: 90 tablet, Rfl: 1  •  vitamin B-12 (CYANOCOBALAMIN) 1000 MCG tablet, Take 1,000 mcg  "by mouth Daily., Disp: , Rfl:         History of Present Illness    Patient is a 67-year-old  female who is being seen today for follow-up status post TAVR implant.  Patient overall notes to be doing well.  Has some occasional chest discomfort which is very brief in nature..  Denies any increasing shortness of breath.  Overall feels that her functional capacity is improving.  She has been attending cardiac rehab.  Notes that she will graduate next week.  However, she is planning on enrolling in the maintenance program.  No reported syncope, near-syncope, or edema.  Has upcoming urologic procedure scheduled.    The following portions of the patient's history were reviewed and updated as appropriate: allergies, current medications, past family history, past medical history, past social history, past surgical history and problem list.      Social History     Tobacco Use   • Smoking status: Never Smoker   • Smokeless tobacco: Never Used   Vaping Use   • Vaping Use: Never used   Substance Use Topics   • Alcohol use: Yes     Comment: 1-2 per month   • Drug use: Never         ROS       Objective:   /68 (BP Location: Right arm, Patient Position: Sitting)   Pulse 84   Ht 147.3 cm (58\")   Wt 56.2 kg (124 lb)   SpO2 97%   BMI 25.92 kg/m²         Vitals reviewed.   Constitutional:       Appearance: Healthy appearance. Well-developed and not in distress.   Neck:      Vascular: No JVD.      Trachea: No tracheal deviation.   Pulmonary:      Effort: Pulmonary effort is normal.      Breath sounds: Normal breath sounds.   Cardiovascular:      Normal rate. Regular rhythm.      Murmurs: There is a grade 3/6 systolic murmur, radiating to the neck.   Pulses:     Intact distal pulses.   Edema:     Peripheral edema absent.   Abdominal:      General: Bowel sounds are normal.      Palpations: Abdomen is soft.      Tenderness: There is no abdominal tenderness.   Musculoskeletal:         General: No deformity. Skin:     " General: Skin is warm and dry.   Neurological:      Mental Status: Alert and oriented to person, place, and time.         Procedures          Assessment:   Assessment & Plan      Diagnoses and all orders for this visit:    1. Aortic valve disorder (Primary), stable.  Status post TAVR.  Recent echocardiogram showing stable function.  Mild perivalvular leak.  Reviewed with patient in the office today.  On aspirin.    2. Essential hypertension, stable.  On benazepril.    3. Mixed hyperlipidemia, stable.  Labs with primary care.  On statin.  Most recent LDL 70 in June.    4. Preop cardiovascular exam, upcoming urologic procedure      Plan:  1. Continue current cardiac medications.  2. Patient may proceed at low cardiac risk for her upcoming urologic procedure.  3. Patient may return to work with 50 pound lifting restriction.  4. Follow-up in 6 months time or sooner if needed.       Karma SOUZA     Dictated utilizing Dragon dictation

## 2022-09-14 ENCOUNTER — PRE-ADMISSION TESTING (OUTPATIENT)
Dept: PREADMISSION TESTING | Facility: HOSPITAL | Age: 67
End: 2022-09-14

## 2022-09-14 ENCOUNTER — TREATMENT (OUTPATIENT)
Dept: CARDIAC REHAB | Facility: HOSPITAL | Age: 67
End: 2022-09-14

## 2022-09-14 VITALS — HEIGHT: 58 IN | WEIGHT: 123.35 LBS | BODY MASS INDEX: 25.89 KG/M2

## 2022-09-14 DIAGNOSIS — N13.5 URETEROPELVIC JUNCTION (UPJ) OBSTRUCTION: ICD-10-CM

## 2022-09-14 DIAGNOSIS — Z95.2 S/P TAVR (TRANSCATHETER AORTIC VALVE REPLACEMENT): Primary | ICD-10-CM

## 2022-09-14 LAB
ANION GAP SERPL CALCULATED.3IONS-SCNC: 12 MMOL/L (ref 5–15)
BUN SERPL-MCNC: 18 MG/DL (ref 8–23)
BUN/CREAT SERPL: 17.3 (ref 7–25)
CALCIUM SPEC-SCNC: 10.1 MG/DL (ref 8.6–10.5)
CHLORIDE SERPL-SCNC: 100 MMOL/L (ref 98–107)
CO2 SERPL-SCNC: 25 MMOL/L (ref 22–29)
CREAT SERPL-MCNC: 1.04 MG/DL (ref 0.57–1)
DEPRECATED RDW RBC AUTO: 41.5 FL (ref 37–54)
EGFRCR SERPLBLD CKD-EPI 2021: 59.4 ML/MIN/1.73
ERYTHROCYTE [DISTWIDTH] IN BLOOD BY AUTOMATED COUNT: 12 % (ref 12.3–15.4)
GLUCOSE SERPL-MCNC: 112 MG/DL (ref 65–99)
HCT VFR BLD AUTO: 39 % (ref 34–46.6)
HGB BLD-MCNC: 13.5 G/DL (ref 12–15.9)
MCH RBC QN AUTO: 32.4 PG (ref 26.6–33)
MCHC RBC AUTO-ENTMCNC: 34.6 G/DL (ref 31.5–35.7)
MCV RBC AUTO: 93.5 FL (ref 79–97)
PLATELET # BLD AUTO: 202 10*3/MM3 (ref 140–450)
PMV BLD AUTO: 10.2 FL (ref 6–12)
POTASSIUM SERPL-SCNC: 4.1 MMOL/L (ref 3.5–5.2)
RBC # BLD AUTO: 4.17 10*6/MM3 (ref 3.77–5.28)
SODIUM SERPL-SCNC: 137 MMOL/L (ref 136–145)
WBC NRBC COR # BLD: 6.9 10*3/MM3 (ref 3.4–10.8)

## 2022-09-14 PROCEDURE — 85027 COMPLETE CBC AUTOMATED: CPT

## 2022-09-14 PROCEDURE — 80048 BASIC METABOLIC PNL TOTAL CA: CPT

## 2022-09-14 PROCEDURE — 36415 COLL VENOUS BLD VENIPUNCTURE: CPT

## 2022-09-14 PROCEDURE — 93798 PHYS/QHP OP CAR RHAB W/ECG: CPT

## 2022-09-16 ENCOUNTER — APPOINTMENT (OUTPATIENT)
Dept: CARDIAC REHAB | Facility: HOSPITAL | Age: 67
End: 2022-09-16

## 2022-09-16 ENCOUNTER — ANESTHESIA EVENT (OUTPATIENT)
Dept: PERIOP | Facility: HOSPITAL | Age: 67
End: 2022-09-16

## 2022-09-16 ENCOUNTER — ANESTHESIA (OUTPATIENT)
Dept: PERIOP | Facility: HOSPITAL | Age: 67
End: 2022-09-16

## 2022-09-16 ENCOUNTER — APPOINTMENT (OUTPATIENT)
Dept: GENERAL RADIOLOGY | Facility: HOSPITAL | Age: 67
End: 2022-09-16

## 2022-09-16 ENCOUNTER — HOSPITAL ENCOUNTER (OUTPATIENT)
Facility: HOSPITAL | Age: 67
Setting detail: HOSPITAL OUTPATIENT SURGERY
Discharge: HOME | End: 2022-09-16
Attending: UROLOGY | Admitting: UROLOGY

## 2022-09-16 VITALS
OXYGEN SATURATION: 98 % | HEART RATE: 65 BPM | TEMPERATURE: 97 F | RESPIRATION RATE: 16 BRPM | SYSTOLIC BLOOD PRESSURE: 168 MMHG | DIASTOLIC BLOOD PRESSURE: 82 MMHG

## 2022-09-16 DIAGNOSIS — N13.5 URETEROPELVIC JUNCTION (UPJ) OBSTRUCTION: ICD-10-CM

## 2022-09-16 PROCEDURE — C1726 CATH, BAL DIL, NON-VASCULAR: HCPCS | Performed by: UROLOGY

## 2022-09-16 PROCEDURE — 25010000002 CEFAZOLIN IN DEXTROSE 2-4 GM/100ML-% SOLUTION: Performed by: UROLOGY

## 2022-09-16 PROCEDURE — 25010000002 ONDANSETRON PER 1 MG: Performed by: NURSE ANESTHETIST, CERTIFIED REGISTERED

## 2022-09-16 PROCEDURE — C2617 STENT, NON-COR, TEM W/O DEL: HCPCS | Performed by: UROLOGY

## 2022-09-16 PROCEDURE — C1758 CATHETER, URETERAL: HCPCS | Performed by: UROLOGY

## 2022-09-16 PROCEDURE — C1769 GUIDE WIRE: HCPCS | Performed by: UROLOGY

## 2022-09-16 PROCEDURE — 25010000002 FENTANYL CITRATE (PF) 50 MCG/ML SOLUTION: Performed by: NURSE ANESTHETIST, CERTIFIED REGISTERED

## 2022-09-16 PROCEDURE — 25010000002 DEXAMETHASONE PER 1 MG: Performed by: NURSE ANESTHETIST, CERTIFIED REGISTERED

## 2022-09-16 PROCEDURE — 25010000002 PROPOFOL 10 MG/ML EMULSION: Performed by: NURSE ANESTHETIST, CERTIFIED REGISTERED

## 2022-09-16 PROCEDURE — 52332 CYSTOSCOPY AND TREATMENT: CPT | Performed by: UROLOGY

## 2022-09-16 PROCEDURE — 52351 CYSTOURETERO & OR PYELOSCOPE: CPT | Performed by: UROLOGY

## 2022-09-16 PROCEDURE — 25010000002 IOPAMIDOL 61 % SOLUTION: Performed by: UROLOGY

## 2022-09-16 PROCEDURE — 74420 UROGRAPHY RTRGR +-KUB: CPT

## 2022-09-16 DEVICE — URETERAL STENT
Type: IMPLANTABLE DEVICE | Site: BLADDER | Status: FUNCTIONAL
Brand: PERCUFLEX™ PLUS

## 2022-09-16 RX ORDER — OXYBUTYNIN CHLORIDE 5 MG/1
5 TABLET, EXTENDED RELEASE ORAL DAILY
Qty: 14 TABLET | Refills: 0 | Status: ON HOLD | OUTPATIENT
Start: 2022-09-16 | End: 2022-11-07

## 2022-09-16 RX ORDER — SODIUM CHLORIDE 9 MG/ML
9 INJECTION, SOLUTION INTRAVENOUS ONCE
Status: COMPLETED | OUTPATIENT
Start: 2022-09-16 | End: 2022-09-16

## 2022-09-16 RX ORDER — HYDROMORPHONE HYDROCHLORIDE 1 MG/ML
0.5 INJECTION, SOLUTION INTRAMUSCULAR; INTRAVENOUS; SUBCUTANEOUS
Status: DISCONTINUED | OUTPATIENT
Start: 2022-09-16 | End: 2022-09-16 | Stop reason: HOSPADM

## 2022-09-16 RX ORDER — FENTANYL CITRATE 50 UG/ML
INJECTION, SOLUTION INTRAMUSCULAR; INTRAVENOUS AS NEEDED
Status: DISCONTINUED | OUTPATIENT
Start: 2022-09-16 | End: 2022-09-16 | Stop reason: SURG

## 2022-09-16 RX ORDER — NITROFURANTOIN 25; 75 MG/1; MG/1
100 CAPSULE ORAL 2 TIMES DAILY
Qty: 14 CAPSULE | Refills: 0 | Status: ON HOLD | OUTPATIENT
Start: 2022-09-16 | End: 2022-11-07

## 2022-09-16 RX ORDER — NALOXONE HCL 0.4 MG/ML
0.4 VIAL (ML) INJECTION AS NEEDED
Status: DISCONTINUED | OUTPATIENT
Start: 2022-09-16 | End: 2022-09-16 | Stop reason: HOSPADM

## 2022-09-16 RX ORDER — MAGNESIUM HYDROXIDE 1200 MG/15ML
LIQUID ORAL AS NEEDED
Status: DISCONTINUED | OUTPATIENT
Start: 2022-09-16 | End: 2022-09-16 | Stop reason: HOSPADM

## 2022-09-16 RX ORDER — MIDAZOLAM HYDROCHLORIDE 1 MG/ML
0.5 INJECTION INTRAMUSCULAR; INTRAVENOUS
Status: DISCONTINUED | OUTPATIENT
Start: 2022-09-16 | End: 2022-09-16 | Stop reason: HOSPADM

## 2022-09-16 RX ORDER — GABAPENTIN 300 MG/1
600 CAPSULE ORAL ONCE
Status: COMPLETED | OUTPATIENT
Start: 2022-09-16 | End: 2022-09-16

## 2022-09-16 RX ORDER — SODIUM CHLORIDE, SODIUM LACTATE, POTASSIUM CHLORIDE, CALCIUM CHLORIDE 600; 310; 30; 20 MG/100ML; MG/100ML; MG/100ML; MG/100ML
9 INJECTION, SOLUTION INTRAVENOUS CONTINUOUS
Status: DISCONTINUED | OUTPATIENT
Start: 2022-09-16 | End: 2022-09-16 | Stop reason: HOSPADM

## 2022-09-16 RX ORDER — IPRATROPIUM BROMIDE AND ALBUTEROL SULFATE 2.5; .5 MG/3ML; MG/3ML
3 SOLUTION RESPIRATORY (INHALATION) ONCE AS NEEDED
Status: DISCONTINUED | OUTPATIENT
Start: 2022-09-16 | End: 2022-09-16 | Stop reason: HOSPADM

## 2022-09-16 RX ORDER — HYDRALAZINE HYDROCHLORIDE 20 MG/ML
5 INJECTION INTRAMUSCULAR; INTRAVENOUS
Status: DISCONTINUED | OUTPATIENT
Start: 2022-09-16 | End: 2022-09-16 | Stop reason: HOSPADM

## 2022-09-16 RX ORDER — HYDROCODONE BITARTRATE AND ACETAMINOPHEN 5; 325 MG/1; MG/1
1 TABLET ORAL ONCE AS NEEDED
Status: DISCONTINUED | OUTPATIENT
Start: 2022-09-16 | End: 2022-09-16 | Stop reason: HOSPADM

## 2022-09-16 RX ORDER — PROPOFOL 10 MG/ML
VIAL (ML) INTRAVENOUS AS NEEDED
Status: DISCONTINUED | OUTPATIENT
Start: 2022-09-16 | End: 2022-09-16 | Stop reason: SURG

## 2022-09-16 RX ORDER — PHENAZOPYRIDINE HYDROCHLORIDE 100 MG/1
100 TABLET, FILM COATED ORAL 3 TIMES DAILY PRN
Qty: 15 TABLET | Refills: 0 | Status: ON HOLD | OUTPATIENT
Start: 2022-09-16 | End: 2022-11-07

## 2022-09-16 RX ORDER — SODIUM CHLORIDE 0.9 % (FLUSH) 0.9 %
3 SYRINGE (ML) INJECTION EVERY 12 HOURS SCHEDULED
Status: DISCONTINUED | OUTPATIENT
Start: 2022-09-16 | End: 2022-09-16 | Stop reason: HOSPADM

## 2022-09-16 RX ORDER — ONDANSETRON 2 MG/ML
INJECTION INTRAMUSCULAR; INTRAVENOUS AS NEEDED
Status: DISCONTINUED | OUTPATIENT
Start: 2022-09-16 | End: 2022-09-16 | Stop reason: SURG

## 2022-09-16 RX ORDER — SODIUM CHLORIDE 0.9 % (FLUSH) 0.9 %
10 SYRINGE (ML) INJECTION EVERY 12 HOURS SCHEDULED
Status: CANCELLED | OUTPATIENT
Start: 2022-09-16

## 2022-09-16 RX ORDER — DROPERIDOL 2.5 MG/ML
0.62 INJECTION, SOLUTION INTRAMUSCULAR; INTRAVENOUS
Status: DISCONTINUED | OUTPATIENT
Start: 2022-09-16 | End: 2022-09-16 | Stop reason: HOSPADM

## 2022-09-16 RX ORDER — ONDANSETRON 2 MG/ML
4 INJECTION INTRAMUSCULAR; INTRAVENOUS ONCE AS NEEDED
Status: DISCONTINUED | OUTPATIENT
Start: 2022-09-16 | End: 2022-09-16 | Stop reason: HOSPADM

## 2022-09-16 RX ORDER — PROMETHAZINE HYDROCHLORIDE 25 MG/1
25 SUPPOSITORY RECTAL ONCE AS NEEDED
Status: DISCONTINUED | OUTPATIENT
Start: 2022-09-16 | End: 2022-09-16 | Stop reason: HOSPADM

## 2022-09-16 RX ORDER — SODIUM CHLORIDE 9 MG/ML
INJECTION, SOLUTION INTRAVENOUS CONTINUOUS PRN
Status: DISCONTINUED | OUTPATIENT
Start: 2022-09-16 | End: 2022-09-16 | Stop reason: SURG

## 2022-09-16 RX ORDER — LIDOCAINE HYDROCHLORIDE 10 MG/ML
0.5 INJECTION, SOLUTION EPIDURAL; INFILTRATION; INTRACAUDAL; PERINEURAL ONCE AS NEEDED
Status: COMPLETED | OUTPATIENT
Start: 2022-09-16 | End: 2022-09-16

## 2022-09-16 RX ORDER — FENTANYL CITRATE 50 UG/ML
50 INJECTION, SOLUTION INTRAMUSCULAR; INTRAVENOUS
Status: DISCONTINUED | OUTPATIENT
Start: 2022-09-16 | End: 2022-09-16 | Stop reason: HOSPADM

## 2022-09-16 RX ORDER — CEFAZOLIN SODIUM 2 G/100ML
2 INJECTION, SOLUTION INTRAVENOUS ONCE
Status: COMPLETED | OUTPATIENT
Start: 2022-09-16 | End: 2022-09-16

## 2022-09-16 RX ORDER — DROPERIDOL 2.5 MG/ML
0.62 INJECTION, SOLUTION INTRAMUSCULAR; INTRAVENOUS ONCE AS NEEDED
Status: DISCONTINUED | OUTPATIENT
Start: 2022-09-16 | End: 2022-09-16 | Stop reason: HOSPADM

## 2022-09-16 RX ORDER — SCOLOPAMINE TRANSDERMAL SYSTEM 1 MG/1
1 PATCH, EXTENDED RELEASE TRANSDERMAL CONTINUOUS
Status: DISCONTINUED | OUTPATIENT
Start: 2022-09-16 | End: 2022-09-16 | Stop reason: HOSPADM

## 2022-09-16 RX ORDER — PROMETHAZINE HYDROCHLORIDE 25 MG/1
25 TABLET ORAL ONCE AS NEEDED
Status: DISCONTINUED | OUTPATIENT
Start: 2022-09-16 | End: 2022-09-16 | Stop reason: HOSPADM

## 2022-09-16 RX ORDER — SODIUM CHLORIDE 0.9 % (FLUSH) 0.9 %
3-10 SYRINGE (ML) INJECTION AS NEEDED
Status: DISCONTINUED | OUTPATIENT
Start: 2022-09-16 | End: 2022-09-16 | Stop reason: HOSPADM

## 2022-09-16 RX ORDER — LABETALOL HYDROCHLORIDE 5 MG/ML
5 INJECTION, SOLUTION INTRAVENOUS
Status: DISCONTINUED | OUTPATIENT
Start: 2022-09-16 | End: 2022-09-16 | Stop reason: HOSPADM

## 2022-09-16 RX ORDER — LIDOCAINE HYDROCHLORIDE 10 MG/ML
INJECTION, SOLUTION EPIDURAL; INFILTRATION; INTRACAUDAL; PERINEURAL AS NEEDED
Status: DISCONTINUED | OUTPATIENT
Start: 2022-09-16 | End: 2022-09-16 | Stop reason: SURG

## 2022-09-16 RX ORDER — SODIUM CHLORIDE 0.9 % (FLUSH) 0.9 %
10 SYRINGE (ML) INJECTION AS NEEDED
Status: CANCELLED | OUTPATIENT
Start: 2022-09-16

## 2022-09-16 RX ORDER — TAMSULOSIN HYDROCHLORIDE 0.4 MG/1
1 CAPSULE ORAL DAILY
Qty: 14 CAPSULE | Refills: 0 | Status: SHIPPED | OUTPATIENT
Start: 2022-09-16 | End: 2022-09-30

## 2022-09-16 RX ORDER — FAMOTIDINE 10 MG/ML
20 INJECTION, SOLUTION INTRAVENOUS ONCE
Status: CANCELLED | OUTPATIENT
Start: 2022-09-16 | End: 2022-09-16

## 2022-09-16 RX ORDER — ACETAMINOPHEN 500 MG
1000 TABLET ORAL ONCE
Status: COMPLETED | OUTPATIENT
Start: 2022-09-16 | End: 2022-09-16

## 2022-09-16 RX ORDER — MELOXICAM 15 MG/1
15 TABLET ORAL ONCE
Status: COMPLETED | OUTPATIENT
Start: 2022-09-16 | End: 2022-09-16

## 2022-09-16 RX ORDER — DEXAMETHASONE SODIUM PHOSPHATE 4 MG/ML
INJECTION, SOLUTION INTRA-ARTICULAR; INTRALESIONAL; INTRAMUSCULAR; INTRAVENOUS; SOFT TISSUE AS NEEDED
Status: DISCONTINUED | OUTPATIENT
Start: 2022-09-16 | End: 2022-09-16 | Stop reason: SURG

## 2022-09-16 RX ORDER — FAMOTIDINE 20 MG/1
20 TABLET, FILM COATED ORAL ONCE
Status: COMPLETED | OUTPATIENT
Start: 2022-09-16 | End: 2022-09-16

## 2022-09-16 RX ADMIN — SODIUM CHLORIDE 9 ML/HR: 9 INJECTION, SOLUTION INTRAVENOUS at 10:41

## 2022-09-16 RX ADMIN — FENTANYL CITRATE 100 MCG: 50 INJECTION, SOLUTION INTRAMUSCULAR; INTRAVENOUS at 12:25

## 2022-09-16 RX ADMIN — MELOXICAM 15 MG: 15 TABLET ORAL at 10:35

## 2022-09-16 RX ADMIN — ACETAMINOPHEN 1000 MG: 500 TABLET, FILM COATED ORAL at 10:35

## 2022-09-16 RX ADMIN — GABAPENTIN 600 MG: 300 CAPSULE ORAL at 10:35

## 2022-09-16 RX ADMIN — PROPOFOL 50 MG: 10 INJECTION, EMULSION INTRAVENOUS at 12:28

## 2022-09-16 RX ADMIN — SCOPALAMINE 1 PATCH: 1 PATCH, EXTENDED RELEASE TRANSDERMAL at 10:36

## 2022-09-16 RX ADMIN — DEXAMETHASONE SODIUM PHOSPHATE 4 MG: 4 INJECTION, SOLUTION INTRA-ARTICULAR; INTRALESIONAL; INTRAMUSCULAR; INTRAVENOUS; SOFT TISSUE at 12:34

## 2022-09-16 RX ADMIN — SODIUM CHLORIDE: 9 INJECTION, SOLUTION INTRAVENOUS at 12:07

## 2022-09-16 RX ADMIN — CEFAZOLIN SODIUM 2 G: 2 INJECTION, SOLUTION INTRAVENOUS at 12:30

## 2022-09-16 RX ADMIN — PROPOFOL 100 MG: 10 INJECTION, EMULSION INTRAVENOUS at 12:25

## 2022-09-16 RX ADMIN — LIDOCAINE HYDROCHLORIDE 50 MG: 10 INJECTION, SOLUTION EPIDURAL; INFILTRATION; INTRACAUDAL; PERINEURAL at 12:25

## 2022-09-16 RX ADMIN — LIDOCAINE HYDROCHLORIDE 0.5 ML: 10 INJECTION, SOLUTION EPIDURAL; INFILTRATION; INTRACAUDAL; PERINEURAL at 10:41

## 2022-09-16 RX ADMIN — ONDANSETRON 4 MG: 2 INJECTION INTRAMUSCULAR; INTRAVENOUS at 12:34

## 2022-09-16 RX ADMIN — FAMOTIDINE 20 MG: 20 TABLET ORAL at 10:35

## 2022-09-16 RX ADMIN — PROPOFOL 50 MG: 10 INJECTION, EMULSION INTRAVENOUS at 12:27

## 2022-09-16 NOTE — ANESTHESIA PREPROCEDURE EVALUATION
Anesthesia Evaluation     Patient summary reviewed and Nursing notes reviewed   NPO Solid Status: > 8 hours  NPO Liquid Status: > 8 hours           Airway   Mallampati: I  TM distance: >3 FB  Neck ROM: full  Dental          Pulmonary     breath sounds clear to auscultation  Cardiovascular   Exercise tolerance: good (4-7 METS)    Rhythm: regular  Rate: normal        Neuro/Psych  GI/Hepatic/Renal/Endo      Musculoskeletal     Abdominal    Substance History      OB/GYN          Other                      Anesthesia Plan    ASA 3     general     intravenous induction     Anesthetic plan, risks, benefits, and alternatives have been provided, discussed and informed consent has been obtained with: patient.        CODE STATUS:

## 2022-09-16 NOTE — DISCHARGE INSTRUCTIONS
Ureteroscopy Post-Operative Care/Expectations    Follow these guidelines after your procedure in order to assist with your recovery.    Anesthesia Precautions and Expectations  - Rest for 24 hours after receiving general anesthesia, make sure you have someone at home with you that can monitor you  - Do not operate a vehicle, drink alcohol, or make 'important decisions'/sign legal documentation during the immediate recovery period if you received sedation for your procedure  - You may experience a sore throat, jaw discomfort, or muscle aches related to anesthesia, these symptoms may last a few days    Activity  - You may resume your normal home activities immediately post-operatively; however, light activity is encouraged for 24 hours to prevent urinary bleeding  - Do not operate a vehicle or drink alcohol if you were prescribed narcotic pain medications     Bathing/Showering  - You may resume normal bathing and showering post-procedure    Pain/Urinary Symptoms  - You may experience burning urinary pain for a few days, and/or increased urinary urgency/frequency post-procedure for a few weeks which is expected (sometimes longer if a ureteral stent was left in place)   - A medication to prevent burning urinary pain (Phenazopyridine) may be prescribed by your doctor, take as directed  - A medication to prevent urinary urgency/frequency (sometimes referred to as “bladder spasms”) (Hyoscyamine, Oxybutynin, Mirabegron, Solifenacin, etc.) may be prescribed by your doctor for up to 1 month, take as directed     Urinary Bleeding (Hematuria)   - Some degree of light urinary bleeding (hematuria) is expected for up to 1-2 weeks (this may be as light as pink lemonade or somewhat darker like clear/pale red Gatorade); a good rule of thumb is that your urine should remain see-through    - If you experience heavy urinary bleeding (like the color and consistency of tomato juice, or red wine), large blood clots, or you are unable to  urinate for more than 8 hours you should contact your doctor and present to the nearest Emergency Department  - Drink plenty of water at home and stay hydrated, as this will help naturally flush out your bladder and urethra    Antibiotics  - Complete the antibiotic course (if) prescribed as directed to prevent urinary infection     When to call your doctor:   - Pain that is not controlled with oral medications  - Signs of significant infection: Fever 101F, shaking chills, profuse sweating, persistent nausea or vomiting, unable to tolerate food or drink   - Severe urinary bleeding or large blood clots in urine  - Inability to urinate for more than 8 hours post-surgery         STENTNSTRUCTIONS    A ureteral stent was left in place after your procedure, the ureteral stent is a flexible plastic tube roughly 9 to 10 inches in length which allows urine to drain from the kidney to the bladder while the inflammation in the ureter is settling down after surgery.  Without the stent in place, the ureter could be blocked due to this ureteral inflammation which could result in severe flank pain.

## 2022-09-16 NOTE — OP NOTE
CYSTOSCOPY URETEROSCOPY RETROGRADE PYELOGRAM STONE EXTRACTION STENT INSERTION  Procedure Report    Patient Name:  Greta Marino  YOB: 1955    Date of Surgery:  9/16/2022     Indications: 67-year-old female with intermittent right flank pain.  CT imaging demonstrates significant right hydronephrosis, likely ureteropelvic junction obstruction.  Multiple small nonobstructing stones within the right kidney.  Nuclear medicine renogram was obtained demonstrating decreased function of the kidney, 23% differential function, delayed T1 half greater than 39 minutes.  The patient continues to be symptomatic with right flank pain resulting in nausea and emesis.  Based upon this we have discussed the indication for diagnostic ureteroscopy, stent placement.  The risk benefits alternatives were discussed with patient she elects to proceed.    Pre-op Diagnosis:   Ureteropelvic junction (UPJ) obstruction [N13.5]       Post-Op Diagnosis Codes:     * Ureteropelvic junction (UPJ) obstruction [N13.5]      Procedure(s):  CYSTOURETHROSCOPY   RIGHT DIAGNOSTIC URETEROSCOPY  RIGHT RETROGRADE PYLEOGRAPHY  RIGHT URETERAL STRICTURE BALLOON DILATION  RIGHT URETERAL STENT PLACEMENT    Staff:  Surgeon(s):  Shahid Lopez MD         Anesthesia: General    Estimated Blood Loss: none    Implants:    Implant Name Type Inv. Item Serial No.  Lot No. LRB No. Used Action   STNT PERCUFLX NO GW 4.8X24 - [id number] Stent STNT PERCUFLX NO GW 4.8X24  [employer] [id number] Right 1 Implanted       Specimen:          None        Findings:   1.  Normal urinary bladder without evidence of tumor stone or foreign body  2.  Normal caliber distal mid proximal right ureter.  At the level of the right UPJ there was obvious narrowing with retrograde imaging.  Significant dilation of the right renal collecting system.  3.  Diagnostic right ureteroscopy with identification of narrowing at the ureteropelvic junction.  4. Balloon  dilation of area of stricture performed   5.  Successful placement of 4.8 [place] by 24 cm double-J ureteral stent without string    Complications: None immediate    Description of Procedure:     After informed consent, the patient was brought back to the operating suite and moved over to the operating table. General anesthesia was smoothly induced, IV antibiotics were administered, and the patient was placed in the dorsal lithotomy position with careful attention focused on padding all pressure points. The patient was prepped and draped in standard fashion. A timeout was performed to ensure the correct patient and procedure    A 22Fr cystoscope was used to cannulate the urethra. The urethra was of normal course and caliber. Upon entering the bladder, pan-cystoscopy revealed no bladder abnormalities. There were no stones, no diverticuli, and no trabeculations. The bilateral ureteral orifices were visualized in their orthotopic positions. Attention was then turned to the right ureteral orifice which was cannulated with 5 [place]arian open-ended catheter.  Contrast was instilled performing retrograde.  There was normal caliber distal mid proximal ureter.  At the level of the UPJ there was obvious narrowing.  Significant dilation of the right renal collecting system.  A sensor wire  was advanced into the kidney under fluoroscopic guidance. The bladder was drained and the cystoscope was removed. The wire was secured as a safety wire.    SEMIRIGID URETEROSCOPY  The semi-rigid ureteroscope was then inserted back into the bladder. The right ureter was cannulated. The ureteroscope was advanced into the right ureter.  Normal caliber ureter noted.  At the level ureteropelvic junction there was obvious area of narrowing.  Contrast was instilled due to scope to repeat retrograde imaging at the level of the area of narrowing. 15mm HG x 4cm dilation balloon was inserted over wire under live fluoroscopy. Dilated to 12 mm Hg under live  fluoroscopy.       CYSTO PLACEMENT  We switched back to the rigid cystoscope which was reinserted over the existing guidewire. A 4.8 F x 24 cm double J ureteral stent was advanced up the right ureter under direct visualization which confirmed good curl in the bladder. Fluoroscopy confirmed good curl in the kidney. The bladder was drained and this concluded our procedure.      The patient was brought back to the PACU in stable condition. All scopes and instruments were in good working order at the end of the case. There were no complications.      Disposition:  - The patient is considered stable for discharge.  She was discharged with ureteral stent in place.   - Scripts at discharge included: Flomax, Pyridium, oxybutynin, Macrobid  Follow up: The patient will follow-up in 2 weeks for postoperative discussion, continue discussion of management of known/confirmed ureteropelvic junction obstruction.          Shahid Lopez MD     Date: 9/16/2022  Time: 14:54 EDT

## 2022-09-16 NOTE — ANESTHESIA PROCEDURE NOTES
Airway  Urgency: elective    Date/Time: 9/16/2022 12:29 PM  Airway not difficult    General Information and Staff    Patient location during procedure: OR  CRNA/CAA: Madhuri Akins CRNA    Indications and Patient Condition  Indications for airway management: airway protection    Preoxygenated: yes  Mask difficulty assessment: 0 - not attempted    Final Airway Details  Final airway type: supraglottic airway      Successful airway: I-gel  Size 2    Number of attempts at approach: 1  Assessment: lips, teeth, and gum same as pre-op    Additional Comments  LMA placed without difficulty, ventilation with assist, equal breath sounds and symmetric chest rise and fall.  #3 LMA too large and would nopt seat well, so changed to #2 LMA, which placed easily with good seat.

## 2022-09-16 NOTE — ANESTHESIA POSTPROCEDURE EVALUATION
Patient: Greta Marino    Procedure Summary     Date: 09/16/22 Room / Location:  [facility]    Anesthesia Start: 1220 Anesthesia Stop: 1324    Procedure: URETEROSCOPY, RETROGRADE PYELOGRAM, STENT INSERTION - RIGHT (Right Bladder) Diagnosis:       Ureteropelvic junction (UPJ) obstruction      (Ureteropelvic junction (UPJ) obstruction [N13.5])    Surgeons: Shahid Lopez MD Provider: Jerrell Vegas MD    Anesthesia Type: general ASA Status: 3          Anesthesia Type: general    Vitals  Vitals Value Taken Time   /75 09/16/22 1321   Temp     Pulse 74 09/16/22 1324   Resp     SpO2 100 % 09/16/22 1324   Vitals shown include unvalidated device data.        Post Anesthesia Care and Evaluation    Patient location during evaluation: PACU  Patient participation: complete - patient participated  Level of consciousness: awake and alert  Pain score: 0  Pain management: adequate    Airway patency: patent  Anesthetic complications: No anesthetic complications  PONV Status: none  Cardiovascular status: hemodynamically stable and acceptable  Respiratory status: nonlabored ventilation, acceptable, nasal cannula and spontaneous ventilation  Hydration status: acceptable

## 2022-09-16 NOTE — INTERVAL H&P NOTE
[facility] Pre-op    Full history and physical note from office is attached.    /92 (BP Location: Right arm, Patient Position: Lying)   Pulse 68   Temp 98.3 °F (36.8 °C) (Temporal)   Resp 16   SpO2 100%     Immunizations:  Influenza:  2021  Pneumococcal:  UTD  Tetanus:  UTD  Covid x3: 2021    LAB Results:  Lab Results   Component Value Date    WBC 6.90 09/14/2022    HGB 13.5 09/14/2022    HCT 39.0 09/14/2022    MCV 93.5 09/14/2022     09/14/2022    NEUTROABS 2.86 07/26/2022    GLUCOSE 112 (H) 09/14/2022    BUN 18 09/14/2022    CREATININE 1.04 (H) 09/14/2022    EGFRIFNONA 87 03/24/2021    EGFRIFAFRI 106 03/24/2021     09/14/2022    K 4.1 09/14/2022     09/14/2022    CO2 25.0 09/14/2022    MG 2.1 07/29/2022    CALCIUM 10.1 09/14/2022    ALBUMIN 4.70 07/26/2022    AST 18 07/26/2022    ALT 11 07/26/2022    BILITOT 0.7 07/26/2022    PTT 26.2 07/28/2022    INR 0.91 07/28/2022       Cancer Staging (if applicable)  Cancer Patient: __ yes __no __unknown__N/A; If yes, clinical stage T:__ N:__M:__, stage group or __N/A      Impression: Ureteral obstruction, right       Plan: URETEROSCOPY, RETROGRADE PYELOGRAM, STENT INSERTION      Radha Campuzano, LIBBY   9/16/2022   10:31 EDT

## 2022-09-19 ENCOUNTER — APPOINTMENT (OUTPATIENT)
Dept: CARDIAC REHAB | Facility: HOSPITAL | Age: 67
End: 2022-09-19

## 2022-09-21 ENCOUNTER — APPOINTMENT (OUTPATIENT)
Dept: CARDIAC REHAB | Facility: HOSPITAL | Age: 67
End: 2022-09-21

## 2022-09-23 ENCOUNTER — APPOINTMENT (OUTPATIENT)
Dept: CARDIAC REHAB | Facility: HOSPITAL | Age: 67
End: 2022-09-23

## 2022-09-26 ENCOUNTER — APPOINTMENT (OUTPATIENT)
Dept: CARDIAC REHAB | Facility: HOSPITAL | Age: 67
End: 2022-09-26

## 2022-09-28 ENCOUNTER — APPOINTMENT (OUTPATIENT)
Dept: CARDIAC REHAB | Facility: HOSPITAL | Age: 67
End: 2022-09-28

## 2022-09-29 ENCOUNTER — OFFICE VISIT (OUTPATIENT)
Dept: UROLOGY | Facility: CLINIC | Age: 67
End: 2022-09-29

## 2022-09-29 VITALS — HEIGHT: 58 IN | WEIGHT: 122 LBS | BODY MASS INDEX: 25.61 KG/M2

## 2022-09-29 DIAGNOSIS — N13.5 URETEROPELVIC JUNCTION (UPJ) OBSTRUCTION: Primary | ICD-10-CM

## 2022-09-29 PROCEDURE — 99215 OFFICE O/P EST HI 40 MIN: CPT | Performed by: UROLOGY

## 2022-09-30 ENCOUNTER — APPOINTMENT (OUTPATIENT)
Dept: CARDIAC REHAB | Facility: HOSPITAL | Age: 67
End: 2022-09-30

## 2022-10-02 NOTE — PROGRESS NOTES
Follow Up Office Visit      Patient Name: Greta Marino  : 1955   MRN: [id number]     Chief Complaint:    Chief Complaint   Patient presents with   • post op f/up       Referring Provider: No ref. provider found    History of Present Illness: Greta Marino is a 67 y.o. female who presents today for 2-week follow-up after cystoscopy, retrograde pyelography, diagnostic ureteroscopy for presumed right ureteropelvic junction obstruction.  She is previously undergone cross-sectional CT imaging, nuclear medicine renogram, above operative evaluation.  The patient reports significant improvement in flank pain and symptoms after stent placement.  Retrograde pyelography as well as diagnostic ureteroscopy consistent with right ureteropelvic junction obstruction, narrowing identified at the right ureteropelvic junction.  Nuclear medicine study with differential function demonstrating 24% function of the right kidney.  She has T1 half of 36 minutes, delayed in nature.    Subjective      Review of System: Review of Systems   I have reviewed the ROS documented by my clinical staff, updated as appropriate and I agree. Shahid Lopez MD    I have reviewed and the following portions of the patient's history were updated as appropriate: past family history, past medical history, past social history, past surgical history and problem list.    Medications:     Current Outpatient Medications:   •  aspirin 81 MG EC tablet, Take 1 tablet by mouth Daily., Disp: 100 tablet, Rfl: 3  •  benazepril (LOTENSIN) 40 MG tablet, Take 1 tablet by mouth Daily., Disp: 90 tablet, Rfl: 1  •  Cholecalciferol (Vitamin D3) 10 MCG (400 UNIT) capsule, Take 4,000 Units by mouth Daily., Disp: , Rfl:   •  NIFEdipine CC (ADALAT CC) 60 MG 24 hr tablet, Take 1 tablet by mouth Daily., Disp: 90 tablet, Rfl: 3  •  nitrofurantoin, macrocrystal-monohydrate, (Macrobid) 100 MG capsule, Take 1 capsule by mouth 2 (Two) Times a Day., Disp: 14 capsule, Rfl:  "0  •  oxybutynin XL (DITROPAN-XL) 5 MG 24 hr tablet, Take 1 tablet by mouth Daily. PRN BLADDER SPASM, Disp: 14 tablet, Rfl: 0  •  phenazopyridine (Pyridium) 100 MG tablet, Take 1 tablet by mouth 3 (Three) Times a Day As Needed (or Dysuria/Bladder pain)., Disp: 15 tablet, Rfl: 0  •  simvastatin (ZOCOR) 40 MG tablet, Take 1 tablet by mouth Every Night., Disp: 90 tablet, Rfl: 1  •  vitamin B-12 (CYANOCOBALAMIN) 1000 MCG tablet, Take 1,000 mcg by mouth Daily., Disp: , Rfl:     Allergies:   Allergies   Allergen Reactions   • Peanut-Containing Drug Products Hives       Objective     Physical Exam:   Vital Signs:   Vitals:    09/29/22 1040   Weight: 55.3 kg (122 lb)   Height: 147.3 cm (57.99\")   PainSc: 2  Comment: uncomfortable     Body mass index is 25.51 kg/m².     Physical Exam  Vitals and nursing note reviewed.   Constitutional:       Appearance: Normal appearance.   HENT:      Head: Normocephalic and atraumatic.   Cardiovascular:      Comments: Well perfused  Pulmonary:      Effort: Pulmonary effort is normal.   Abdominal:      General: Abdomen is flat.      Palpations: Abdomen is soft.   Musculoskeletal:         General: Normal range of motion.   Skin:     General: Skin is warm and dry.   Neurological:      General: No focal deficit present.      Mental Status: She is alert and oriented to person, place, and time. Mental status is at baseline.   Psychiatric:         Mood and Affect: Mood normal.         Behavior: Behavior normal.         Thought Content: Thought content normal.         Judgment: Judgment normal.         Labs:   Brief Urine Lab Results  (Last result in the past 365 days)      Color   Clarity   Blood   Leuk Est   Nitrite   Protein   CREAT   Urine HCG        04/26/22 1417 Yellow   Clear   3+   Small (1+)   Negative   Negative                 Urine Culture    Urine Culture 8/30/22   Urine Culture No growth              Lab Results   Component Value Date    GLUCOSE 112 (H) 09/14/2022    CALCIUM 10.1 " 09/14/2022     09/14/2022    K 4.1 09/14/2022    CO2 25.0 09/14/2022     09/14/2022    BUN 18 09/14/2022    CREATININE 1.04 (H) 09/14/2022    EGFRIFAFRI 106 03/24/2021    EGFRIFNONA 87 03/24/2021    BCR 17.3 09/14/2022    ANIONGAP 12.0 09/14/2022       Lab Results   Component Value Date    WBC 6.90 09/14/2022    HGB 13.5 09/14/2022    HCT 39.0 09/14/2022    MCV 93.5 09/14/2022     09/14/2022       Images:   XR Chest 2 View    Result Date: 8/29/2022  Interval TAVR. No acute cardiopulmonary findings.  This report was finalized on 8/29/2022 5:53 PM by Colin Bell MD.      NM Renal With Flow & Function With Pharmacological Intervention    Result Date: 7/14/2022   1. Normal function of the left kidney with no evidence of hydronephrosis. 2. Impaired function of the right kidney with decreased uptake, poor function and delayed excretion likely related to poor function and hydronephrosis as seen on CT 03/25/2022.  This report was finalized on 7/14/2022 4:03 PM by Aleshia Martinez MD.      MRI abdomen w wo contrast mrcp    Result Date: 7/19/2022  1. 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 Largest cystic lesion measures 1.6 cm. Recommend MRCP follow-up in one year to document stability. No main duct dilatation or solid pancreatic mass. 2. Moderate right hydronephrosis with renal pelvic dilatation which may relate to chronic UPJ stenosis, improved from CT on 03/25/2022. Nonobstructing right lower pole renal calculi again noted. 3. Additional chronic findings above.  This report was finalized on 7/19/2022 8:52 AM by Eyad Walter MD.      Chest X-Ray PA & Lateral    Result Date: 7/26/2022  No active disease  This report was finalized on 7/26/2022 10:28 AM by Marty Bergman MD.      XR Pyelogram Retrograde    Result Date: 9/16/2022  1. Intraoperative fluoroscopy during right-sided retrograde evaluation with stent  placement. Please see operative report for full findings and details.  This report was finalized on 9/16/2022 1:54 PM by Marcus Yusuf MD.        Measures:   Tobacco:   Greta Marino  reports that she has never smoked. She has never used smokeless tobacco.. I have educated her on the risk of diseases from using tobacco products.           Urine Incontinence: ( NOUI)  Patient reports that she is not currently experiencing any symptoms of urinary incontinence.    Assessment / Plan      Assessment/Plan:   67 y.o. female is seen today for follow up after cystoscopy, right retrograde pyelogram, diagnostic right ureteroscopy and stent placement.  She reports significant improvement in flank pain and symptoms.  Cross-sectional CT imaging as well as nuclear medicine renogram demonstrating a right ureteropelvic junction obstruction.  Differential function is approximately 24 to 25% on the right.  T1 half 36 minutes.  Today we have reviewed her prior imaging studies, nuclear medicine study, operative findings.  We have discussed that at this time she has significant symptomatic improvement with ureteral stent, obstruction relieved.  We have discussed the indication to consider operative intervention, reconstructive repair based upon her symptomatic improvement.  We have discussed that although she has decreased renal function still has function greater than 20% and consideration of repair is warranted.  We have discussed the risks and benefits of pyeloplasty.  Discussed that this is a minimally invasive procedure performed laparoscopically.  We have discussed expected postoperative outcome.  Discussed expected postoperative course.  She would like to consider desire for procedure, intervention.  She is understanding of the indication.  She is understanding that ureteral stent is in place, that long-term exchange is not appropriate based upon her overall health status, functional status.  She will consider and we will  meet back to further discuss operative intervention.  She is understanding agreeable plan of care.    Diagnoses and all orders for this visit:    1. Ureteropelvic junction (UPJ) obstruction (Primary)         Follow Up:   No follow-ups on file.     I spent approximately 40 minutes providing clinical care for this patient; including review of patient's chart and provider documentation, face to face time spent with patient in examination room (obtaining history, performing physical exam, discussing diagnosis and management options), placing orders, and completing patient documentation.     Shahid Lopez MD  [facility]

## 2022-10-03 ENCOUNTER — APPOINTMENT (OUTPATIENT)
Dept: CARDIAC REHAB | Facility: HOSPITAL | Age: 67
End: 2022-10-03

## 2022-10-05 ENCOUNTER — TELEPHONE (OUTPATIENT)
Dept: UROLOGY | Facility: CLINIC | Age: 67
End: 2022-10-05

## 2022-10-05 ENCOUNTER — APPOINTMENT (OUTPATIENT)
Dept: CARDIAC REHAB | Facility: HOSPITAL | Age: 67
End: 2022-10-05

## 2022-10-05 NOTE — TELEPHONE ENCOUNTER
Patient called stating she is wanting to move forward with procedure.She also has some questions regarding the procedure and is requesting you give her a call.

## 2022-10-07 ENCOUNTER — TELEPHONE (OUTPATIENT)
Dept: CARDIOLOGY | Facility: CLINIC | Age: 67
End: 2022-10-07

## 2022-10-07 ENCOUNTER — APPOINTMENT (OUTPATIENT)
Dept: CARDIAC REHAB | Facility: HOSPITAL | Age: 67
End: 2022-10-07

## 2022-10-07 NOTE — TELEPHONE ENCOUNTER
Requesting pre urologic procedure risk assessment, had a procedure in Sept, this is a separate procedure and she was advised by Dr. Lopez she will need a risk assessment for this as well.   Planned procedure   Ureteropelvic junction (UPJ) obstruction

## 2022-10-10 ENCOUNTER — APPOINTMENT (OUTPATIENT)
Dept: CARDIAC REHAB | Facility: HOSPITAL | Age: 67
End: 2022-10-10

## 2022-10-12 ENCOUNTER — APPOINTMENT (OUTPATIENT)
Dept: CARDIAC REHAB | Facility: HOSPITAL | Age: 67
End: 2022-10-12

## 2022-10-14 ENCOUNTER — APPOINTMENT (OUTPATIENT)
Dept: CARDIAC REHAB | Facility: HOSPITAL | Age: 67
End: 2022-10-14

## 2022-10-15 DIAGNOSIS — E78.2 MIXED HYPERLIPIDEMIA: ICD-10-CM

## 2022-10-15 DIAGNOSIS — I10 ESSENTIAL HYPERTENSION: ICD-10-CM

## 2022-10-17 ENCOUNTER — APPOINTMENT (OUTPATIENT)
Dept: CARDIAC REHAB | Facility: HOSPITAL | Age: 67
End: 2022-10-17

## 2022-10-17 ENCOUNTER — OFFICE VISIT (OUTPATIENT)
Dept: UROLOGY | Facility: CLINIC | Age: 67
End: 2022-10-17

## 2022-10-17 VITALS — BODY MASS INDEX: 25.61 KG/M2 | HEIGHT: 58 IN | WEIGHT: 122 LBS

## 2022-10-17 DIAGNOSIS — N13.5 URETEROPELVIC JUNCTION (UPJ) OBSTRUCTION: Primary | ICD-10-CM

## 2022-10-17 PROCEDURE — 99215 OFFICE O/P EST HI 40 MIN: CPT | Performed by: UROLOGY

## 2022-10-17 NOTE — PROGRESS NOTES
Follow Up Office Visit      Patient Name: Greta Marino  : 1955   MRN: [id number]     Chief Complaint:    Chief Complaint   Patient presents with   • Ureteropelvic junction (UPJ) obstruction       History of Present Illness: Greta Marino is a 67 y.o. female who presents today for follow up for surgical discussion regarding management of known right ureteropelvic junction obstruction.  She was identified to have incidental hydronephrosis consistent with possible UPJ obstruction.  She has undergone diagnostic ureteroscopy, right retrograde pyelogram and right ureteral stent placement.  She has reported significant symptomatic improvement with stents in place including resolution of significant right flank pain.  She is undergone previous nuclear medicine imaging demonstrating poor function, delayed function and obstruction of the right kidney consistent with UPJ.  She presents today for further discussion of management.    Subjective      Review of System: Review of Systems   Constitutional: Negative.  Negative for chills, fatigue, fever and unexpected weight change.   HENT: Negative.  Negative for sore throat.    Eyes: Negative.  Negative for visual disturbance.   Respiratory: Negative.  Negative for cough, chest tightness and shortness of breath.    Cardiovascular: Negative.  Negative for chest pain and leg swelling.   Gastrointestinal: Positive for constipation. Negative for blood in stool, diarrhea, nausea, rectal pain and vomiting.   Genitourinary: Positive for hematuria. Negative for decreased urine volume, difficulty urinating, dysuria, enuresis, flank pain, frequency, genital sores and urgency.   Musculoskeletal: Negative.  Negative for back pain and joint swelling.   Skin: Negative.  Negative for rash and wound.   Neurological: Negative.  Negative for seizures, speech difficulty, weakness and headaches.   Psychiatric/Behavioral: Negative.  Negative for confusion, sleep disturbance and  "suicidal ideas. The patient is not nervous/anxious.       I have reviewed the ROS documented by my clinical staff, updated as appropriate and I agree. Shahid Lopez MD    I have reviewed and the following portions of the patient's history were updated as appropriate: past family history, past medical history, past social history, past surgical history and problem list.    Medications:     Current Outpatient Medications:   •  aspirin 81 MG EC tablet, Take 1 tablet by mouth Daily., Disp: 100 tablet, Rfl: 3  •  benazepril (LOTENSIN) 40 MG tablet, TAKE ONE TABLET BY MOUTH DAILY, Disp: 90 tablet, Rfl: 1  •  Cholecalciferol (Vitamin D3) 10 MCG (400 UNIT) capsule, Take 4,000 Units by mouth Daily., Disp: , Rfl:   •  NIFEdipine CC (ADALAT CC) 60 MG 24 hr tablet, Take 1 tablet by mouth Daily., Disp: 90 tablet, Rfl: 3  •  nitrofurantoin, macrocrystal-monohydrate, (Macrobid) 100 MG capsule, Take 1 capsule by mouth 2 (Two) Times a Day., Disp: 14 capsule, Rfl: 0  •  oxybutynin XL (DITROPAN-XL) 5 MG 24 hr tablet, Take 1 tablet by mouth Daily. PRN BLADDER SPASM, Disp: 14 tablet, Rfl: 0  •  phenazopyridine (Pyridium) 100 MG tablet, Take 1 tablet by mouth 3 (Three) Times a Day As Needed (or Dysuria/Bladder pain)., Disp: 15 tablet, Rfl: 0  •  simvastatin (ZOCOR) 40 MG tablet, TAKE ONE TABLET BY MOUTH ONCE NIGHTLY, Disp: 90 tablet, Rfl: 1  •  vitamin B-12 (CYANOCOBALAMIN) 1000 MCG tablet, Take 1,000 mcg by mouth Daily., Disp: , Rfl:     Allergies:   Allergies   Allergen Reactions   • Peanut-Containing Drug Products Hives     Objective     Physical Exam:   Vital Signs:   Vitals:    10/17/22 0941   Weight: 55.3 kg (122 lb)   Height: 147.3 cm (57.99\")   PainSc:   2     Body mass index is 25.51 kg/m².     Physical Exam  Vitals and nursing note reviewed.   Constitutional:       Appearance: Normal appearance.   HENT:      Head: Normocephalic and atraumatic.   Cardiovascular:      Comments: Well perfused  Pulmonary:      Effort: Pulmonary " effort is normal.   Abdominal:      General: Abdomen is flat.      Palpations: Abdomen is soft.   Musculoskeletal:         General: Normal range of motion.   Skin:     General: Skin is warm and dry.   Neurological:      General: No focal deficit present.      Mental Status: She is alert and oriented to person, place, and time. Mental status is at baseline.   Psychiatric:         Mood and Affect: Mood normal.         Behavior: Behavior normal.         Thought Content: Thought content normal.         Judgment: Judgment normal.         Labs:   Brief Urine Lab Results  (Last result in the past 365 days)      Color   Clarity   Blood   Leuk Est   Nitrite   Protein   CREAT   Urine HCG        04/26/22 1417 Yellow   Clear   3+   Small (1+)   Negative   Negative                 Urine Culture    Urine Culture 8/30/22   Urine Culture No growth              Lab Results   Component Value Date    GLUCOSE 112 (H) 09/14/2022    CALCIUM 10.1 09/14/2022     09/14/2022    K 4.1 09/14/2022    CO2 25.0 09/14/2022     09/14/2022    BUN 18 09/14/2022    CREATININE 1.04 (H) 09/14/2022    EGFRIFAFRI 106 03/24/2021    EGFRIFNONA 87 03/24/2021    BCR 17.3 09/14/2022    ANIONGAP 12.0 09/14/2022       Lab Results   Component Value Date    WBC 6.90 09/14/2022    HGB 13.5 09/14/2022    HCT 39.0 09/14/2022    MCV 93.5 09/14/2022     09/14/2022       Images:     NM Renal With Flow & Function With Pharmacological Intervention    Result Date: 7/14/2022   1. Normal function of the left kidney with no evidence of hydronephrosis. 2. Impaired function of the right kidney with decreased uptake, poor function and delayed excretion likely related to poor function and hydronephrosis as seen on CT 03/25/2022.  This report was finalized on 7/14/2022 4:03 PM by Aleshia Martinez MD.        XR Pyelogram Retrograde    Result Date: 9/16/2022  1. Intraoperative fluoroscopy during right-sided retrograde evaluation with stent placement. Please see  operative report for full findings and details.  This report was finalized on 9/16/2022 1:54 PM by Marcus Yusuf MD.        Measures:   Tobacco:   Greta Marino  reports that she has never smoked. She has never used smokeless tobacco.. I have educated her on the risk of diseases from using tobacco products.    Assessment / Plan      Assessment/Plan:   67 y.o. female is seen today for follow up regarding surgical management of right ureteropelvic junction obstruction.  She has had multiple imaging studies revealing incidentally found right hydronephrosis consistent with UPJ obstruction.  She has undergone nuclear medicine renogram demonstrating 24% function of the right kidney, 76% function of the left kidney.  She has a prolonged T1 half at 36 minutes on the right.  She has undergone cystoscopy, right retrograde pyelogram, right diagnostic ureteroscopy and stent placement.  She has had significant resolution in symptoms with stent placement.  After our discussion we have discussed options including surveillance, chronic stent exchange, pyeloplasty with definitive repair, nephrectomy.  She has had loss of renal function at 24% but at this time consider that this is still functional and she would like to attempt reconstructive repair.  We are in agreement that reconstructive repair is warranted even with decreased function.  We have discussed the risks and benefits of pyeloplasty.  We have discussed the specifics of right robotic pyeloplasty including postoperative expectations.  We have discussed the indication for ureteral stent placement for 4 to 6 weeks following procedure.  We have discussed specific risks including infection, bleeding, damage to surrounding structures, urine leak, prolonged stent requirement, recurrence of obstruction.  We have discussed her procedural risk factors including her recent TEVR procedure she will require cardiac clearance.  After our long discussion today she would like to  proceed with intervention.  We will schedule her procedure 11/7/2022, she require cardiac clearance and preoperative testing.    Diagnoses and all orders for this visit:    1. Ureteropelvic junction (UPJ) obstruction (Primary)        I spent approximately 45 minutes providing clinical care for this patient; including review of patient's chart and provider documentation, face to face time spent with patient in examination room (obtaining history, performing physical exam, discussing diagnosis and management options), placing orders, and completing patient documentation.     Shahid Lopez MD  [facility]

## 2022-10-17 NOTE — H&P (VIEW-ONLY)
Follow Up Office Visit      Patient Name: Greta Marino  : 1955   MRN: [id number]     Chief Complaint:    Chief Complaint   Patient presents with   • Ureteropelvic junction (UPJ) obstruction       History of Present Illness: Greta Marino is a 67 y.o. female who presents today for follow up for surgical discussion regarding management of known right ureteropelvic junction obstruction.  She was identified to have incidental hydronephrosis consistent with possible UPJ obstruction.  She has undergone diagnostic ureteroscopy, right retrograde pyelogram and right ureteral stent placement.  She has reported significant symptomatic improvement with stents in place including resolution of significant right flank pain.  She is undergone previous nuclear medicine imaging demonstrating poor function, delayed function and obstruction of the right kidney consistent with UPJ.  She presents today for further discussion of management.    Subjective      Review of System: Review of Systems   Constitutional: Negative.  Negative for chills, fatigue, fever and unexpected weight change.   HENT: Negative.  Negative for sore throat.    Eyes: Negative.  Negative for visual disturbance.   Respiratory: Negative.  Negative for cough, chest tightness and shortness of breath.    Cardiovascular: Negative.  Negative for chest pain and leg swelling.   Gastrointestinal: Positive for constipation. Negative for blood in stool, diarrhea, nausea, rectal pain and vomiting.   Genitourinary: Positive for hematuria. Negative for decreased urine volume, difficulty urinating, dysuria, enuresis, flank pain, frequency, genital sores and urgency.   Musculoskeletal: Negative.  Negative for back pain and joint swelling.   Skin: Negative.  Negative for rash and wound.   Neurological: Negative.  Negative for seizures, speech difficulty, weakness and headaches.   Psychiatric/Behavioral: Negative.  Negative for confusion, sleep disturbance and  "suicidal ideas. The patient is not nervous/anxious.       I have reviewed the ROS documented by my clinical staff, updated as appropriate and I agree. Shahid Lopez MD    I have reviewed and the following portions of the patient's history were updated as appropriate: past family history, past medical history, past social history, past surgical history and problem list.    Medications:     Current Outpatient Medications:   •  aspirin 81 MG EC tablet, Take 1 tablet by mouth Daily., Disp: 100 tablet, Rfl: 3  •  benazepril (LOTENSIN) 40 MG tablet, TAKE ONE TABLET BY MOUTH DAILY, Disp: 90 tablet, Rfl: 1  •  Cholecalciferol (Vitamin D3) 10 MCG (400 UNIT) capsule, Take 4,000 Units by mouth Daily., Disp: , Rfl:   •  NIFEdipine CC (ADALAT CC) 60 MG 24 hr tablet, Take 1 tablet by mouth Daily., Disp: 90 tablet, Rfl: 3  •  nitrofurantoin, macrocrystal-monohydrate, (Macrobid) 100 MG capsule, Take 1 capsule by mouth 2 (Two) Times a Day., Disp: 14 capsule, Rfl: 0  •  oxybutynin XL (DITROPAN-XL) 5 MG 24 hr tablet, Take 1 tablet by mouth Daily. PRN BLADDER SPASM, Disp: 14 tablet, Rfl: 0  •  phenazopyridine (Pyridium) 100 MG tablet, Take 1 tablet by mouth 3 (Three) Times a Day As Needed (or Dysuria/Bladder pain)., Disp: 15 tablet, Rfl: 0  •  simvastatin (ZOCOR) 40 MG tablet, TAKE ONE TABLET BY MOUTH ONCE NIGHTLY, Disp: 90 tablet, Rfl: 1  •  vitamin B-12 (CYANOCOBALAMIN) 1000 MCG tablet, Take 1,000 mcg by mouth Daily., Disp: , Rfl:     Allergies:   Allergies   Allergen Reactions   • Peanut-Containing Drug Products Hives     Objective     Physical Exam:   Vital Signs:   Vitals:    10/17/22 0941   Weight: 55.3 kg (122 lb)   Height: 147.3 cm (57.99\")   PainSc:   2     Body mass index is 25.51 kg/m².     Physical Exam  Vitals and nursing note reviewed.   Constitutional:       Appearance: Normal appearance.   HENT:      Head: Normocephalic and atraumatic.   Cardiovascular:      Comments: Well perfused  Pulmonary:      Effort: Pulmonary " effort is normal.   Abdominal:      General: Abdomen is flat.      Palpations: Abdomen is soft.   Musculoskeletal:         General: Normal range of motion.   Skin:     General: Skin is warm and dry.   Neurological:      General: No focal deficit present.      Mental Status: She is alert and oriented to person, place, and time. Mental status is at baseline.   Psychiatric:         Mood and Affect: Mood normal.         Behavior: Behavior normal.         Thought Content: Thought content normal.         Judgment: Judgment normal.         Labs:   Brief Urine Lab Results  (Last result in the past 365 days)      Color   Clarity   Blood   Leuk Est   Nitrite   Protein   CREAT   Urine HCG        04/26/22 1417 Yellow   Clear   3+   Small (1+)   Negative   Negative                 Urine Culture    Urine Culture 8/30/22   Urine Culture No growth              Lab Results   Component Value Date    GLUCOSE 112 (H) 09/14/2022    CALCIUM 10.1 09/14/2022     09/14/2022    K 4.1 09/14/2022    CO2 25.0 09/14/2022     09/14/2022    BUN 18 09/14/2022    CREATININE 1.04 (H) 09/14/2022    EGFRIFAFRI 106 03/24/2021    EGFRIFNONA 87 03/24/2021    BCR 17.3 09/14/2022    ANIONGAP 12.0 09/14/2022       Lab Results   Component Value Date    WBC 6.90 09/14/2022    HGB 13.5 09/14/2022    HCT 39.0 09/14/2022    MCV 93.5 09/14/2022     09/14/2022       Images:     NM Renal With Flow & Function With Pharmacological Intervention    Result Date: 7/14/2022   1. Normal function of the left kidney with no evidence of hydronephrosis. 2. Impaired function of the right kidney with decreased uptake, poor function and delayed excretion likely related to poor function and hydronephrosis as seen on CT 03/25/2022.  This report was finalized on 7/14/2022 4:03 PM by Aleshia Martinez MD.        XR Pyelogram Retrograde    Result Date: 9/16/2022  1. Intraoperative fluoroscopy during right-sided retrograde evaluation with stent placement. Please see  operative report for full findings and details.  This report was finalized on 9/16/2022 1:54 PM by Marcus Yusuf MD.        Measures:   Tobacco:   Greta Marino  reports that she has never smoked. She has never used smokeless tobacco.. I have educated her on the risk of diseases from using tobacco products.    Assessment / Plan      Assessment/Plan:   67 y.o. female is seen today for follow up regarding surgical management of right ureteropelvic junction obstruction.  She has had multiple imaging studies revealing incidentally found right hydronephrosis consistent with UPJ obstruction.  She has undergone nuclear medicine renogram demonstrating 24% function of the right kidney, 76% function of the left kidney.  She has a prolonged T1 half at 36 minutes on the right.  She has undergone cystoscopy, right retrograde pyelogram, right diagnostic ureteroscopy and stent placement.  She has had significant resolution in symptoms with stent placement.  After our discussion we have discussed options including surveillance, chronic stent exchange, pyeloplasty with definitive repair, nephrectomy.  She has had loss of renal function at 24% but at this time consider that this is still functional and she would like to attempt reconstructive repair.  We are in agreement that reconstructive repair is warranted even with decreased function.  We have discussed the risks and benefits of pyeloplasty.  We have discussed the specifics of right robotic pyeloplasty including postoperative expectations.  We have discussed the indication for ureteral stent placement for 4 to 6 weeks following procedure.  We have discussed specific risks including infection, bleeding, damage to surrounding structures, urine leak, prolonged stent requirement, recurrence of obstruction.  We have discussed her procedural risk factors including her recent TEVR procedure she will require cardiac clearance.  After our long discussion today she would like to  proceed with intervention.  We will schedule her procedure 11/7/2022, she require cardiac clearance and preoperative testing.    Diagnoses and all orders for this visit:    1. Ureteropelvic junction (UPJ) obstruction (Primary)        I spent approximately 45 minutes providing clinical care for this patient; including review of patient's chart and provider documentation, face to face time spent with patient in examination room (obtaining history, performing physical exam, discussing diagnosis and management options), placing orders, and completing patient documentation.     Shahid Lopez MD  [facility] [place]

## 2022-10-18 RX ORDER — BENAZEPRIL HYDROCHLORIDE 40 MG/1
TABLET, FILM COATED ORAL
Qty: 90 TABLET | Refills: 1 | Status: SHIPPED | OUTPATIENT
Start: 2022-10-18

## 2022-10-18 RX ORDER — SIMVASTATIN 40 MG
TABLET ORAL
Qty: 90 TABLET | Refills: 1 | Status: SHIPPED | OUTPATIENT
Start: 2022-10-18 | End: 2023-01-10

## 2022-10-19 ENCOUNTER — APPOINTMENT (OUTPATIENT)
Dept: CARDIAC REHAB | Facility: HOSPITAL | Age: 67
End: 2022-10-19

## 2022-10-21 ENCOUNTER — TELEPHONE (OUTPATIENT)
Dept: UROLOGY | Facility: CLINIC | Age: 67
End: 2022-10-21

## 2022-10-21 ENCOUNTER — PREP FOR SURGERY (OUTPATIENT)
Dept: OTHER | Facility: HOSPITAL | Age: 67
End: 2022-10-21

## 2022-10-21 ENCOUNTER — APPOINTMENT (OUTPATIENT)
Dept: CARDIAC REHAB | Facility: HOSPITAL | Age: 67
End: 2022-10-21

## 2022-10-21 DIAGNOSIS — N13.5 URETEROPELVIC JUNCTION (UPJ) OBSTRUCTION, RIGHT: Primary | ICD-10-CM

## 2022-10-21 RX ORDER — CEFAZOLIN SODIUM 2 G/100ML
2 INJECTION, SOLUTION INTRAVENOUS ONCE
Status: CANCELLED | OUTPATIENT
Start: 2022-10-21 | End: 2022-10-21

## 2022-10-21 RX ORDER — ACETAMINOPHEN 500 MG
1000 TABLET ORAL ONCE
Status: CANCELLED | OUTPATIENT
Start: 2022-10-21 | End: 2022-10-21

## 2022-10-21 RX ORDER — SCOLOPAMINE TRANSDERMAL SYSTEM 1 MG/1
1 PATCH, EXTENDED RELEASE TRANSDERMAL CONTINUOUS
Status: CANCELLED | OUTPATIENT
Start: 2022-10-21 | End: 2022-10-24

## 2022-10-21 RX ORDER — GABAPENTIN 300 MG/1
600 CAPSULE ORAL ONCE
Status: CANCELLED | OUTPATIENT
Start: 2022-10-21 | End: 2022-10-21

## 2022-10-21 NOTE — TELEPHONE ENCOUNTER
PT called as she had not heard from our office regarding her upcomming procedure. She wanted to get that scheduled so she could take time off of work.

## 2022-10-24 ENCOUNTER — APPOINTMENT (OUTPATIENT)
Dept: CARDIAC REHAB | Facility: HOSPITAL | Age: 67
End: 2022-10-24

## 2022-10-26 ENCOUNTER — APPOINTMENT (OUTPATIENT)
Dept: CARDIAC REHAB | Facility: HOSPITAL | Age: 67
End: 2022-10-26

## 2022-10-26 ENCOUNTER — TELEPHONE (OUTPATIENT)
Dept: UROLOGY | Facility: CLINIC | Age: 67
End: 2022-10-26

## 2022-10-26 NOTE — TELEPHONE ENCOUNTER
Pt called and stated that she takes an 81 mg ASA daily and wants to know when she needs to know how many days prior to surgery she needs to hold this?  She also asked if it is ok for her to get her 4 covid booster and flu shot prior to or should she wait until after the procedure?

## 2022-10-28 ENCOUNTER — APPOINTMENT (OUTPATIENT)
Dept: CARDIAC REHAB | Facility: HOSPITAL | Age: 67
End: 2022-10-28

## 2022-10-31 ENCOUNTER — APPOINTMENT (OUTPATIENT)
Dept: CARDIAC REHAB | Facility: HOSPITAL | Age: 67
End: 2022-10-31

## 2022-11-02 ENCOUNTER — APPOINTMENT (OUTPATIENT)
Dept: CARDIAC REHAB | Facility: HOSPITAL | Age: 67
End: 2022-11-02

## 2022-11-04 ENCOUNTER — APPOINTMENT (OUTPATIENT)
Dept: CARDIAC REHAB | Facility: HOSPITAL | Age: 67
End: 2022-11-04

## 2022-11-04 ENCOUNTER — PRE-ADMISSION TESTING (OUTPATIENT)
Dept: PREADMISSION TESTING | Facility: HOSPITAL | Age: 67
End: 2022-11-04

## 2022-11-04 VITALS — BODY MASS INDEX: 25.5 KG/M2 | HEIGHT: 58 IN

## 2022-11-04 DIAGNOSIS — N13.5 URETEROPELVIC JUNCTION (UPJ) OBSTRUCTION, RIGHT: ICD-10-CM

## 2022-11-04 LAB
ABO GROUP BLD: NORMAL
ANION GAP SERPL CALCULATED.3IONS-SCNC: 11 MMOL/L (ref 5–15)
BLD GP AB SCN SERPL QL: NEGATIVE
BUN SERPL-MCNC: 16 MG/DL (ref 8–23)
BUN/CREAT SERPL: 23.2 (ref 7–25)
CALCIUM SPEC-SCNC: 9.7 MG/DL (ref 8.6–10.5)
CHLORIDE SERPL-SCNC: 104 MMOL/L (ref 98–107)
CO2 SERPL-SCNC: 24 MMOL/L (ref 22–29)
CREAT SERPL-MCNC: 0.69 MG/DL (ref 0.57–1)
DEPRECATED RDW RBC AUTO: 40 FL (ref 37–54)
EGFRCR SERPLBLD CKD-EPI 2021: 95.9 ML/MIN/1.73
ERYTHROCYTE [DISTWIDTH] IN BLOOD BY AUTOMATED COUNT: 11.7 % (ref 12.3–15.4)
GLUCOSE SERPL-MCNC: 99 MG/DL (ref 65–99)
HBA1C MFR BLD: 4.8 % (ref 4.8–5.6)
HCT VFR BLD AUTO: 40.9 % (ref 34–46.6)
HGB BLD-MCNC: 13.7 G/DL (ref 12–15.9)
MCH RBC QN AUTO: 31.4 PG (ref 26.6–33)
MCHC RBC AUTO-ENTMCNC: 33.5 G/DL (ref 31.5–35.7)
MCV RBC AUTO: 93.6 FL (ref 79–97)
PLATELET # BLD AUTO: 172 10*3/MM3 (ref 140–450)
PMV BLD AUTO: 9.2 FL (ref 6–12)
POTASSIUM SERPL-SCNC: 4.1 MMOL/L (ref 3.5–5.2)
RBC # BLD AUTO: 4.37 10*6/MM3 (ref 3.77–5.28)
RH BLD: NEGATIVE
SODIUM SERPL-SCNC: 139 MMOL/L (ref 136–145)
T&S EXPIRATION DATE: NORMAL
WBC NRBC COR # BLD: 4.43 10*3/MM3 (ref 3.4–10.8)

## 2022-11-04 PROCEDURE — 80048 BASIC METABOLIC PNL TOTAL CA: CPT

## 2022-11-04 PROCEDURE — 86900 BLOOD TYPING SEROLOGIC ABO: CPT

## 2022-11-04 PROCEDURE — 85027 COMPLETE CBC AUTOMATED: CPT

## 2022-11-04 PROCEDURE — 86850 RBC ANTIBODY SCREEN: CPT

## 2022-11-04 PROCEDURE — 86901 BLOOD TYPING SEROLOGIC RH(D): CPT

## 2022-11-04 PROCEDURE — 83036 HEMOGLOBIN GLYCOSYLATED A1C: CPT

## 2022-11-04 PROCEDURE — 36415 COLL VENOUS BLD VENIPUNCTURE: CPT

## 2022-11-04 NOTE — PAT
Patient viewed general PAT education video as instructed in their preoperative information received from their surgeon.  Patient stated the general PAT education video was viewed in its entirety and survey completed.  Copies of [facility] general education handouts (Incentive Spirometry, Meds to Beds Program, Patient Belongings, Pre-op skin preparation instructions, Blood Glucose testing, Visitor policy, Surgery FAQ, Code H) distributed to patient if not printed. Education related to the PAT pass and skin preparation for surgery (if applicable) completed in PAT as a reinforcement to PAT education video. Patient instructed to return PAT pass provided today as well as completed skin preparation sheet (if applicable) on the day of procedure.     Additionally if patient had not viewed video yet but intended to view it at home or in our waiting area, then referred them to the handout with QR code/link provided during PAT visit.  Instructed patient to complete survey after viewing the video in its entirety.  Encouraged patient/family to read [facility] general education handouts thoroughly and notify PAT staff with any questions or concerns. Patient verbalized understanding of all information and priority content.    Patient to apply Chlorhexadine wipes  to surgical area (as instructed) the night before procedure and the AM of procedure. Wipes provided.    Patient instructed to drink 20 ounces of Gatorade and it needs to be completed 1 hour (for Main OR patients) or 2 hours (scheduled  section & [facility] patients) before given arrival time for procedure (NO RED Gatorade)    Patient verbalized understanding.    Per Anesthesia Request, patient instructed not to take their ACE/ARB medications on the AM of surgery.    Pt denies any new chest pain or shortness of breath.  Cardiac clearance note on chart from Dr. Redd's office 10-7-2022.   Left message with Dr. Redd's nurse to get okay to hold ASA 5 days as instructed by   Shelbie

## 2022-11-04 NOTE — PAT
Despite requests, the ASA was not addressed by cardiology. Dr. Lopez told the patient not to take for 5 days prior to surgery.

## 2022-11-06 ENCOUNTER — ANESTHESIA EVENT (OUTPATIENT)
Dept: PERIOP | Facility: HOSPITAL | Age: 67
End: 2022-11-06

## 2022-11-06 RX ORDER — SODIUM CHLORIDE 0.9 % (FLUSH) 0.9 %
10 SYRINGE (ML) INJECTION EVERY 12 HOURS SCHEDULED
Status: CANCELLED | OUTPATIENT
Start: 2022-11-06

## 2022-11-06 RX ORDER — FAMOTIDINE 10 MG/ML
20 INJECTION, SOLUTION INTRAVENOUS ONCE
Status: CANCELLED | OUTPATIENT
Start: 2022-11-06 | End: 2022-11-06

## 2022-11-06 RX ORDER — SODIUM CHLORIDE 0.9 % (FLUSH) 0.9 %
10 SYRINGE (ML) INJECTION AS NEEDED
Status: CANCELLED | OUTPATIENT
Start: 2022-11-06

## 2022-11-07 ENCOUNTER — HOSPITAL ENCOUNTER (OUTPATIENT)
Facility: HOSPITAL | Age: 67
LOS: 1 days | Discharge: HOME | End: 2022-11-08
Attending: UROLOGY | Admitting: UROLOGY

## 2022-11-07 ENCOUNTER — APPOINTMENT (OUTPATIENT)
Dept: CARDIAC REHAB | Facility: HOSPITAL | Age: 67
End: 2022-11-07

## 2022-11-07 ENCOUNTER — ANESTHESIA EVENT CONVERTED (OUTPATIENT)
Dept: ANESTHESIOLOGY | Facility: HOSPITAL | Age: 67
End: 2022-11-07

## 2022-11-07 ENCOUNTER — ANESTHESIA (OUTPATIENT)
Dept: PERIOP | Facility: HOSPITAL | Age: 67
End: 2022-11-07

## 2022-11-07 DIAGNOSIS — N13.5 URETEROPELVIC JUNCTION (UPJ) OBSTRUCTION: Primary | ICD-10-CM

## 2022-11-07 DIAGNOSIS — N13.5 URETEROPELVIC JUNCTION (UPJ) OBSTRUCTION, RIGHT: ICD-10-CM

## 2022-11-07 LAB
ANION GAP SERPL CALCULATED.3IONS-SCNC: 15 MMOL/L (ref 5–15)
BASOPHILS # BLD AUTO: 0.03 10*3/MM3 (ref 0–0.2)
BASOPHILS NFR BLD AUTO: 0.3 % (ref 0–1.5)
BUN SERPL-MCNC: 14 MG/DL (ref 8–23)
BUN/CREAT SERPL: 17.5 (ref 7–25)
CALCIUM SPEC-SCNC: 9.2 MG/DL (ref 8.6–10.5)
CHLORIDE SERPL-SCNC: 105 MMOL/L (ref 98–107)
CLUMPED PLATELETS: PRESENT
CO2 SERPL-SCNC: 19 MMOL/L (ref 22–29)
CREAT SERPL-MCNC: 0.8 MG/DL (ref 0.57–1)
DEPRECATED RDW RBC AUTO: 42.1 FL (ref 37–54)
EGFRCR SERPLBLD CKD-EPI 2021: 81.4 ML/MIN/1.73
EOSINOPHIL # BLD AUTO: 0.02 10*3/MM3 (ref 0–0.4)
EOSINOPHIL NFR BLD AUTO: 0.2 % (ref 0.3–6.2)
ERYTHROCYTE [DISTWIDTH] IN BLOOD BY AUTOMATED COUNT: 11.7 % (ref 12.3–15.4)
GLUCOSE SERPL-MCNC: 148 MG/DL (ref 65–99)
HCT VFR BLD AUTO: 39.1 % (ref 34–46.6)
HGB BLD-MCNC: 12.8 G/DL (ref 12–15.9)
IMM GRANULOCYTES # BLD AUTO: 0.02 10*3/MM3 (ref 0–0.05)
IMM GRANULOCYTES NFR BLD AUTO: 0.2 % (ref 0–0.5)
LYMPHOCYTES # BLD AUTO: 0.65 10*3/MM3 (ref 0.7–3.1)
LYMPHOCYTES NFR BLD AUTO: 6.5 % (ref 19.6–45.3)
MCH RBC QN AUTO: 31.9 PG (ref 26.6–33)
MCHC RBC AUTO-ENTMCNC: 32.7 G/DL (ref 31.5–35.7)
MCV RBC AUTO: 97.5 FL (ref 79–97)
MONOCYTES # BLD AUTO: 0.13 10*3/MM3 (ref 0.1–0.9)
MONOCYTES NFR BLD AUTO: 1.3 % (ref 5–12)
NEUTROPHILS NFR BLD AUTO: 9.11 10*3/MM3 (ref 1.7–7)
NEUTROPHILS NFR BLD AUTO: 91.5 % (ref 42.7–76)
NRBC BLD AUTO-RTO: 0 /100 WBC (ref 0–0.2)
PLATELET # BLD AUTO: 96 10*3/MM3 (ref 140–450)
PMV BLD AUTO: 10.4 FL (ref 6–12)
POTASSIUM SERPL-SCNC: 4.4 MMOL/L (ref 3.5–5.2)
RBC # BLD AUTO: 4.01 10*6/MM3 (ref 3.77–5.28)
RBC MORPH BLD: NORMAL
SMALL PLATELETS BLD QL SMEAR: NORMAL
SODIUM SERPL-SCNC: 139 MMOL/L (ref 136–145)
WBC MORPH BLD: NORMAL
WBC NRBC COR # BLD: 9.96 10*3/MM3 (ref 3.4–10.8)

## 2022-11-07 PROCEDURE — 88305 TISSUE EXAM BY PATHOLOGIST: CPT | Performed by: UROLOGY

## 2022-11-07 PROCEDURE — C2617 STENT, NON-COR, TEM W/O DEL: HCPCS | Performed by: UROLOGY

## 2022-11-07 PROCEDURE — 25010000002 FENTANYL CITRATE (PF) 100 MCG/2ML SOLUTION: Performed by: NURSE ANESTHETIST, CERTIFIED REGISTERED

## 2022-11-07 PROCEDURE — 52332 CYSTOSCOPY AND TREATMENT: CPT | Performed by: UROLOGY

## 2022-11-07 PROCEDURE — 25010000002 DEXAMETHASONE PER 1 MG: Performed by: NURSE ANESTHETIST, CERTIFIED REGISTERED

## 2022-11-07 PROCEDURE — 25010000002 ONDANSETRON PER 1 MG: Performed by: NURSE ANESTHETIST, CERTIFIED REGISTERED

## 2022-11-07 PROCEDURE — 99024 POSTOP FOLLOW-UP VISIT: CPT | Performed by: UROLOGY

## 2022-11-07 PROCEDURE — 25010000002 HEPARIN (PORCINE) PER 1000 UNITS: Performed by: UROLOGY

## 2022-11-07 PROCEDURE — 25010000002 PROPOFOL 10 MG/ML EMULSION: Performed by: NURSE ANESTHETIST, CERTIFIED REGISTERED

## 2022-11-07 PROCEDURE — 85007 BL SMEAR W/DIFF WBC COUNT: CPT | Performed by: UROLOGY

## 2022-11-07 PROCEDURE — 80048 BASIC METABOLIC PNL TOTAL CA: CPT | Performed by: UROLOGY

## 2022-11-07 PROCEDURE — 50544 LAPAROSCOPY PYELOPLASTY: CPT | Performed by: UROLOGY

## 2022-11-07 PROCEDURE — 82365 CALCULUS SPECTROSCOPY: CPT | Performed by: UROLOGY

## 2022-11-07 PROCEDURE — 99222 1ST HOSP IP/OBS MODERATE 55: CPT | Performed by: NURSE PRACTITIONER

## 2022-11-07 PROCEDURE — 25010000002 FENTANYL CITRATE (PF) 50 MCG/ML SOLUTION

## 2022-11-07 PROCEDURE — 50544 LAPAROSCOPY PYELOPLASTY: CPT | Performed by: STUDENT IN AN ORGANIZED HEALTH CARE EDUCATION/TRAINING PROGRAM

## 2022-11-07 PROCEDURE — 85025 COMPLETE CBC W/AUTO DIFF WBC: CPT | Performed by: UROLOGY

## 2022-11-07 DEVICE — SEALANT WND FIBRIN TISSEEL PREFIL/SYR/PRIMAFZ 4ML: Type: IMPLANTABLE DEVICE | Site: PELVIS | Status: FUNCTIONAL

## 2022-11-07 DEVICE — URETERAL STENT
Type: IMPLANTABLE DEVICE | Site: URETER | Status: FUNCTIONAL
Brand: PERCUFLEX™ PLUS

## 2022-11-07 DEVICE — CLIP LIG HEMOLOK PA LG 6CT PRP: Type: IMPLANTABLE DEVICE | Site: PELVIS | Status: FUNCTIONAL

## 2022-11-07 RX ORDER — ACETAMINOPHEN 325 MG/1
650 TABLET ORAL EVERY 6 HOURS
Status: DISCONTINUED | OUTPATIENT
Start: 2022-11-07 | End: 2022-11-08 | Stop reason: HOSPADM

## 2022-11-07 RX ORDER — ACETAMINOPHEN 160 MG/5ML
650 SOLUTION ORAL EVERY 4 HOURS PRN
Status: DISCONTINUED | OUTPATIENT
Start: 2022-11-07 | End: 2022-11-08 | Stop reason: HOSPADM

## 2022-11-07 RX ORDER — ROCURONIUM BROMIDE 10 MG/ML
INJECTION, SOLUTION INTRAVENOUS AS NEEDED
Status: DISCONTINUED | OUTPATIENT
Start: 2022-11-07 | End: 2022-11-07 | Stop reason: SURG

## 2022-11-07 RX ORDER — SODIUM CHLORIDE, SODIUM LACTATE, POTASSIUM CHLORIDE, CALCIUM CHLORIDE 600; 310; 30; 20 MG/100ML; MG/100ML; MG/100ML; MG/100ML
100 INJECTION, SOLUTION INTRAVENOUS CONTINUOUS
Status: DISCONTINUED | OUTPATIENT
Start: 2022-11-07 | End: 2022-11-08 | Stop reason: HOSPADM

## 2022-11-07 RX ORDER — CEFAZOLIN SODIUM IN 0.9 % NACL 2 G/100 ML
2 PLASTIC BAG, INJECTION (ML) INTRAVENOUS ONCE
Status: COMPLETED | OUTPATIENT
Start: 2022-11-07 | End: 2022-11-07

## 2022-11-07 RX ORDER — MAGNESIUM HYDROXIDE 1200 MG/15ML
LIQUID ORAL AS NEEDED
Status: DISCONTINUED | OUTPATIENT
Start: 2022-11-07 | End: 2022-11-07 | Stop reason: HOSPADM

## 2022-11-07 RX ORDER — LIDOCAINE HYDROCHLORIDE 10 MG/ML
INJECTION, SOLUTION EPIDURAL; INFILTRATION; INTRACAUDAL; PERINEURAL AS NEEDED
Status: DISCONTINUED | OUTPATIENT
Start: 2022-11-07 | End: 2022-11-07 | Stop reason: SURG

## 2022-11-07 RX ORDER — DROPERIDOL 2.5 MG/ML
0.62 INJECTION, SOLUTION INTRAMUSCULAR; INTRAVENOUS
Status: DISCONTINUED | OUTPATIENT
Start: 2022-11-07 | End: 2022-11-07 | Stop reason: HOSPADM

## 2022-11-07 RX ORDER — LISINOPRIL 40 MG/1
40 TABLET ORAL
Status: DISCONTINUED | OUTPATIENT
Start: 2022-11-07 | End: 2022-11-08 | Stop reason: HOSPADM

## 2022-11-07 RX ORDER — ACETAMINOPHEN 160 MG/5ML
650 SOLUTION ORAL EVERY 6 HOURS
Status: DISCONTINUED | OUTPATIENT
Start: 2022-11-07 | End: 2022-11-08 | Stop reason: HOSPADM

## 2022-11-07 RX ORDER — ONDANSETRON 2 MG/ML
4 INJECTION INTRAMUSCULAR; INTRAVENOUS ONCE AS NEEDED
Status: DISCONTINUED | OUTPATIENT
Start: 2022-11-07 | End: 2022-11-07 | Stop reason: HOSPADM

## 2022-11-07 RX ORDER — SODIUM CHLORIDE 9 MG/ML
INJECTION, SOLUTION INTRAVENOUS AS NEEDED
Status: DISCONTINUED | OUTPATIENT
Start: 2022-11-07 | End: 2022-11-07 | Stop reason: HOSPADM

## 2022-11-07 RX ORDER — FENTANYL CITRATE 50 UG/ML
50 INJECTION, SOLUTION INTRAMUSCULAR; INTRAVENOUS
Status: DISCONTINUED | OUTPATIENT
Start: 2022-11-07 | End: 2022-11-07 | Stop reason: HOSPADM

## 2022-11-07 RX ORDER — ACETAMINOPHEN 650 MG/1
650 SUPPOSITORY RECTAL EVERY 6 HOURS
Status: DISCONTINUED | OUTPATIENT
Start: 2022-11-07 | End: 2022-11-08 | Stop reason: HOSPADM

## 2022-11-07 RX ORDER — ACETAMINOPHEN 325 MG/1
650 TABLET ORAL EVERY 4 HOURS PRN
Status: DISCONTINUED | OUTPATIENT
Start: 2022-11-07 | End: 2022-11-08 | Stop reason: HOSPADM

## 2022-11-07 RX ORDER — PROMETHAZINE HYDROCHLORIDE 25 MG/1
25 TABLET ORAL ONCE AS NEEDED
Status: DISCONTINUED | OUTPATIENT
Start: 2022-11-07 | End: 2022-11-07 | Stop reason: HOSPADM

## 2022-11-07 RX ORDER — MIDAZOLAM HYDROCHLORIDE 1 MG/ML
0.5 INJECTION INTRAMUSCULAR; INTRAVENOUS
Status: DISCONTINUED | OUTPATIENT
Start: 2022-11-07 | End: 2022-11-07 | Stop reason: HOSPADM

## 2022-11-07 RX ORDER — LABETALOL HYDROCHLORIDE 5 MG/ML
5 INJECTION, SOLUTION INTRAVENOUS
Status: DISCONTINUED | OUTPATIENT
Start: 2022-11-07 | End: 2022-11-07 | Stop reason: HOSPADM

## 2022-11-07 RX ORDER — IPRATROPIUM BROMIDE AND ALBUTEROL SULFATE 2.5; .5 MG/3ML; MG/3ML
3 SOLUTION RESPIRATORY (INHALATION) ONCE AS NEEDED
Status: DISCONTINUED | OUTPATIENT
Start: 2022-11-07 | End: 2022-11-07 | Stop reason: HOSPADM

## 2022-11-07 RX ORDER — ATORVASTATIN CALCIUM 20 MG/1
20 TABLET, FILM COATED ORAL NIGHTLY
Status: DISCONTINUED | OUTPATIENT
Start: 2022-11-07 | End: 2022-11-08 | Stop reason: HOSPADM

## 2022-11-07 RX ORDER — SODIUM CHLORIDE 0.9 % (FLUSH) 0.9 %
3-10 SYRINGE (ML) INJECTION AS NEEDED
Status: DISCONTINUED | OUTPATIENT
Start: 2022-11-07 | End: 2022-11-07 | Stop reason: HOSPADM

## 2022-11-07 RX ORDER — MORPHINE SULFATE 2 MG/ML
1 INJECTION, SOLUTION INTRAMUSCULAR; INTRAVENOUS EVERY 4 HOURS PRN
Status: DISCONTINUED | OUTPATIENT
Start: 2022-11-07 | End: 2022-11-08 | Stop reason: HOSPADM

## 2022-11-07 RX ORDER — SODIUM CHLORIDE 0.9 % (FLUSH) 0.9 %
10 SYRINGE (ML) INJECTION EVERY 12 HOURS SCHEDULED
Status: DISCONTINUED | OUTPATIENT
Start: 2022-11-07 | End: 2022-11-08 | Stop reason: HOSPADM

## 2022-11-07 RX ORDER — PROPOFOL 10 MG/ML
VIAL (ML) INTRAVENOUS AS NEEDED
Status: DISCONTINUED | OUTPATIENT
Start: 2022-11-07 | End: 2022-11-07 | Stop reason: SURG

## 2022-11-07 RX ORDER — HEPARIN SODIUM 5000 [USP'U]/ML
5000 INJECTION, SOLUTION INTRAVENOUS; SUBCUTANEOUS EVERY 8 HOURS SCHEDULED
Status: DISCONTINUED | OUTPATIENT
Start: 2022-11-07 | End: 2022-11-08 | Stop reason: HOSPADM

## 2022-11-07 RX ORDER — PROMETHAZINE HYDROCHLORIDE 25 MG/1
25 SUPPOSITORY RECTAL ONCE AS NEEDED
Status: DISCONTINUED | OUTPATIENT
Start: 2022-11-07 | End: 2022-11-07 | Stop reason: HOSPADM

## 2022-11-07 RX ORDER — HYDROMORPHONE HYDROCHLORIDE 1 MG/ML
0.5 INJECTION, SOLUTION INTRAMUSCULAR; INTRAVENOUS; SUBCUTANEOUS
Status: DISCONTINUED | OUTPATIENT
Start: 2022-11-07 | End: 2022-11-07 | Stop reason: HOSPADM

## 2022-11-07 RX ORDER — NIFEDIPINE 60 MG/1
60 TABLET, EXTENDED RELEASE ORAL DAILY
Status: DISCONTINUED | OUTPATIENT
Start: 2022-11-07 | End: 2022-11-08 | Stop reason: HOSPADM

## 2022-11-07 RX ORDER — GABAPENTIN 300 MG/1
600 CAPSULE ORAL ONCE
Status: DISCONTINUED | OUTPATIENT
Start: 2022-11-07 | End: 2022-11-07 | Stop reason: HOSPADM

## 2022-11-07 RX ORDER — FAMOTIDINE 20 MG/1
20 TABLET, FILM COATED ORAL ONCE
Status: COMPLETED | OUTPATIENT
Start: 2022-11-07 | End: 2022-11-07

## 2022-11-07 RX ORDER — ONDANSETRON 2 MG/ML
INJECTION INTRAMUSCULAR; INTRAVENOUS AS NEEDED
Status: DISCONTINUED | OUTPATIENT
Start: 2022-11-07 | End: 2022-11-07 | Stop reason: SURG

## 2022-11-07 RX ORDER — SCOLOPAMINE TRANSDERMAL SYSTEM 1 MG/1
1 PATCH, EXTENDED RELEASE TRANSDERMAL CONTINUOUS
Status: DISCONTINUED | OUTPATIENT
Start: 2022-11-07 | End: 2022-11-08 | Stop reason: HOSPADM

## 2022-11-07 RX ORDER — SODIUM CHLORIDE 0.9 % (FLUSH) 0.9 %
10 SYRINGE (ML) INJECTION AS NEEDED
Status: DISCONTINUED | OUTPATIENT
Start: 2022-11-07 | End: 2022-11-08 | Stop reason: HOSPADM

## 2022-11-07 RX ORDER — ONDANSETRON 4 MG/1
4 TABLET, FILM COATED ORAL EVERY 6 HOURS PRN
Status: DISCONTINUED | OUTPATIENT
Start: 2022-11-07 | End: 2022-11-08 | Stop reason: HOSPADM

## 2022-11-07 RX ORDER — DEXAMETHASONE SODIUM PHOSPHATE 4 MG/ML
INJECTION, SOLUTION INTRA-ARTICULAR; INTRALESIONAL; INTRAMUSCULAR; INTRAVENOUS; SOFT TISSUE AS NEEDED
Status: DISCONTINUED | OUTPATIENT
Start: 2022-11-07 | End: 2022-11-07 | Stop reason: SURG

## 2022-11-07 RX ORDER — CALCIUM CARBONATE 200(500)MG
2 TABLET,CHEWABLE ORAL 2 TIMES DAILY PRN
Status: DISCONTINUED | OUTPATIENT
Start: 2022-11-07 | End: 2022-11-08 | Stop reason: HOSPADM

## 2022-11-07 RX ORDER — SODIUM CHLORIDE 0.9 % (FLUSH) 0.9 %
3 SYRINGE (ML) INJECTION EVERY 12 HOURS SCHEDULED
Status: DISCONTINUED | OUTPATIENT
Start: 2022-11-07 | End: 2022-11-07 | Stop reason: HOSPADM

## 2022-11-07 RX ORDER — HYDROCODONE BITARTRATE AND ACETAMINOPHEN 5; 325 MG/1; MG/1
TABLET ORAL
Status: COMPLETED
Start: 2022-11-07 | End: 2022-11-07

## 2022-11-07 RX ORDER — FENTANYL CITRATE 50 UG/ML
INJECTION, SOLUTION INTRAMUSCULAR; INTRAVENOUS
Status: COMPLETED
Start: 2022-11-07 | End: 2022-11-07

## 2022-11-07 RX ORDER — DROPERIDOL 2.5 MG/ML
0.62 INJECTION, SOLUTION INTRAMUSCULAR; INTRAVENOUS ONCE AS NEEDED
Status: DISCONTINUED | OUTPATIENT
Start: 2022-11-07 | End: 2022-11-07 | Stop reason: HOSPADM

## 2022-11-07 RX ORDER — METHOCARBAMOL 750 MG/1
750 TABLET, FILM COATED ORAL 4 TIMES DAILY
Status: DISCONTINUED | OUTPATIENT
Start: 2022-11-07 | End: 2022-11-08 | Stop reason: HOSPADM

## 2022-11-07 RX ORDER — ACETAMINOPHEN 650 MG/1
650 SUPPOSITORY RECTAL EVERY 4 HOURS PRN
Status: DISCONTINUED | OUTPATIENT
Start: 2022-11-07 | End: 2022-11-08 | Stop reason: HOSPADM

## 2022-11-07 RX ORDER — EPHEDRINE SULFATE 50 MG/ML
INJECTION INTRAVENOUS AS NEEDED
Status: DISCONTINUED | OUTPATIENT
Start: 2022-11-07 | End: 2022-11-07 | Stop reason: SURG

## 2022-11-07 RX ORDER — HEPARIN SODIUM 5000 [USP'U]/ML
INJECTION, SOLUTION INTRAVENOUS; SUBCUTANEOUS AS NEEDED
Status: DISCONTINUED | OUTPATIENT
Start: 2022-11-07 | End: 2022-11-07 | Stop reason: HOSPADM

## 2022-11-07 RX ORDER — ONDANSETRON 2 MG/ML
4 INJECTION INTRAMUSCULAR; INTRAVENOUS EVERY 6 HOURS PRN
Status: DISCONTINUED | OUTPATIENT
Start: 2022-11-07 | End: 2022-11-08 | Stop reason: HOSPADM

## 2022-11-07 RX ORDER — NALOXONE HCL 0.4 MG/ML
0.4 VIAL (ML) INJECTION
Status: DISCONTINUED | OUTPATIENT
Start: 2022-11-07 | End: 2022-11-08 | Stop reason: HOSPADM

## 2022-11-07 RX ORDER — LANOLIN ALCOHOL/MO/W.PET/CERES
1000 CREAM (GRAM) TOPICAL DAILY
Status: DISCONTINUED | OUTPATIENT
Start: 2022-11-07 | End: 2022-11-08 | Stop reason: HOSPADM

## 2022-11-07 RX ORDER — MEPERIDINE HYDROCHLORIDE 25 MG/ML
12.5 INJECTION INTRAMUSCULAR; INTRAVENOUS; SUBCUTANEOUS
Status: DISCONTINUED | OUTPATIENT
Start: 2022-11-07 | End: 2022-11-07 | Stop reason: HOSPADM

## 2022-11-07 RX ORDER — AMOXICILLIN 250 MG
2 CAPSULE ORAL 2 TIMES DAILY
Status: DISCONTINUED | OUTPATIENT
Start: 2022-11-07 | End: 2022-11-08 | Stop reason: HOSPADM

## 2022-11-07 RX ORDER — HYDRALAZINE HYDROCHLORIDE 20 MG/ML
5 INJECTION INTRAMUSCULAR; INTRAVENOUS
Status: DISCONTINUED | OUTPATIENT
Start: 2022-11-07 | End: 2022-11-07 | Stop reason: HOSPADM

## 2022-11-07 RX ORDER — SODIUM CHLORIDE, SODIUM LACTATE, POTASSIUM CHLORIDE, CALCIUM CHLORIDE 600; 310; 30; 20 MG/100ML; MG/100ML; MG/100ML; MG/100ML
9 INJECTION, SOLUTION INTRAVENOUS CONTINUOUS
Status: DISCONTINUED | OUTPATIENT
Start: 2022-11-07 | End: 2022-11-08 | Stop reason: HOSPADM

## 2022-11-07 RX ORDER — GABAPENTIN 100 MG/1
100 CAPSULE ORAL 3 TIMES DAILY
Status: DISCONTINUED | OUTPATIENT
Start: 2022-11-07 | End: 2022-11-08 | Stop reason: HOSPADM

## 2022-11-07 RX ORDER — ACETAMINOPHEN 500 MG
1000 TABLET ORAL ONCE
Status: COMPLETED | OUTPATIENT
Start: 2022-11-07 | End: 2022-11-07

## 2022-11-07 RX ORDER — NALOXONE HCL 0.4 MG/ML
0.4 VIAL (ML) INJECTION AS NEEDED
Status: DISCONTINUED | OUTPATIENT
Start: 2022-11-07 | End: 2022-11-07 | Stop reason: HOSPADM

## 2022-11-07 RX ORDER — LIDOCAINE HYDROCHLORIDE 10 MG/ML
0.5 INJECTION, SOLUTION EPIDURAL; INFILTRATION; INTRACAUDAL; PERINEURAL ONCE AS NEEDED
Status: COMPLETED | OUTPATIENT
Start: 2022-11-07 | End: 2022-11-07

## 2022-11-07 RX ORDER — HYDROCODONE BITARTRATE AND ACETAMINOPHEN 5; 325 MG/1; MG/1
1 TABLET ORAL ONCE AS NEEDED
Status: DISCONTINUED | OUTPATIENT
Start: 2022-11-07 | End: 2022-11-07 | Stop reason: HOSPADM

## 2022-11-07 RX ORDER — FENTANYL CITRATE 50 UG/ML
INJECTION, SOLUTION INTRAMUSCULAR; INTRAVENOUS AS NEEDED
Status: DISCONTINUED | OUTPATIENT
Start: 2022-11-07 | End: 2022-11-07 | Stop reason: SURG

## 2022-11-07 RX ADMIN — HEPARIN SODIUM 5000 UNITS: 5000 INJECTION INTRAVENOUS; SUBCUTANEOUS at 13:47

## 2022-11-07 RX ADMIN — METHOCARBAMOL 750 MG: 750 TABLET ORAL at 22:42

## 2022-11-07 RX ADMIN — SODIUM CHLORIDE, POTASSIUM CHLORIDE, SODIUM LACTATE AND CALCIUM CHLORIDE 9 ML/HR: 600; 310; 30; 20 INJECTION, SOLUTION INTRAVENOUS at 06:50

## 2022-11-07 RX ADMIN — ROCURONIUM BROMIDE 20 MG: 10 INJECTION, SOLUTION INTRAVENOUS at 10:34

## 2022-11-07 RX ADMIN — HEPARIN SODIUM 5000 UNITS: 5000 INJECTION INTRAVENOUS; SUBCUTANEOUS at 22:43

## 2022-11-07 RX ADMIN — ACETAMINOPHEN 650 MG: 325 TABLET, FILM COATED ORAL at 13:48

## 2022-11-07 RX ADMIN — CYANOCOBALAMIN TAB 1000 MCG 1000 MCG: 1000 TAB at 13:47

## 2022-11-07 RX ADMIN — DEXAMETHASONE SODIUM PHOSPHATE 4 MG: 4 INJECTION, SOLUTION INTRA-ARTICULAR; INTRALESIONAL; INTRAMUSCULAR; INTRAVENOUS; SOFT TISSUE at 09:01

## 2022-11-07 RX ADMIN — ROCURONIUM BROMIDE 20 MG: 10 INJECTION, SOLUTION INTRAVENOUS at 09:37

## 2022-11-07 RX ADMIN — CEFAZOLIN 2 G: 10 INJECTION, POWDER, FOR SOLUTION INTRAVENOUS at 11:39

## 2022-11-07 RX ADMIN — ROCURONIUM BROMIDE 30 MG: 10 INJECTION, SOLUTION INTRAVENOUS at 07:36

## 2022-11-07 RX ADMIN — SENNOSIDES AND DOCUSATE SODIUM 2 TABLET: 50; 8.6 TABLET ORAL at 22:42

## 2022-11-07 RX ADMIN — PROPOFOL 25 MCG/KG/MIN: 10 INJECTION, EMULSION INTRAVENOUS at 09:14

## 2022-11-07 RX ADMIN — SODIUM CHLORIDE, POTASSIUM CHLORIDE, SODIUM LACTATE AND CALCIUM CHLORIDE 100 ML/HR: 600; 310; 30; 20 INJECTION, SOLUTION INTRAVENOUS at 13:13

## 2022-11-07 RX ADMIN — FENTANYL CITRATE 50 MCG: 50 INJECTION, SOLUTION INTRAMUSCULAR; INTRAVENOUS at 12:27

## 2022-11-07 RX ADMIN — ROCURONIUM BROMIDE 10 MG: 10 INJECTION, SOLUTION INTRAVENOUS at 11:07

## 2022-11-07 RX ADMIN — HYDROCODONE BITARTRATE AND ACETAMINOPHEN 1 TABLET: 5; 325 TABLET ORAL at 12:40

## 2022-11-07 RX ADMIN — FAMOTIDINE 20 MG: 20 TABLET ORAL at 06:50

## 2022-11-07 RX ADMIN — NIFEDIPINE 60 MG: 60 TABLET, EXTENDED RELEASE ORAL at 15:10

## 2022-11-07 RX ADMIN — LIDOCAINE HYDROCHLORIDE 50 MG: 10 INJECTION, SOLUTION EPIDURAL; INFILTRATION; INTRACAUDAL; PERINEURAL at 07:36

## 2022-11-07 RX ADMIN — GABAPENTIN 100 MG: 100 CAPSULE ORAL at 15:09

## 2022-11-07 RX ADMIN — Medication 10 ML: at 13:47

## 2022-11-07 RX ADMIN — FENTANYL CITRATE 50 MCG: 50 INJECTION, SOLUTION INTRAMUSCULAR; INTRAVENOUS at 11:44

## 2022-11-07 RX ADMIN — ACETAMINOPHEN 1000 MG: 500 TABLET ORAL at 06:50

## 2022-11-07 RX ADMIN — LISINOPRIL 40 MG: 40 TABLET ORAL at 15:10

## 2022-11-07 RX ADMIN — SODIUM CHLORIDE, POTASSIUM CHLORIDE, SODIUM LACTATE AND CALCIUM CHLORIDE 100 ML/HR: 600; 310; 30; 20 INJECTION, SOLUTION INTRAVENOUS at 22:52

## 2022-11-07 RX ADMIN — ACETAMINOPHEN 650 MG: 325 TABLET, FILM COATED ORAL at 22:42

## 2022-11-07 RX ADMIN — METHOCARBAMOL 750 MG: 750 TABLET ORAL at 17:00

## 2022-11-07 RX ADMIN — CEFAZOLIN 2 G: 10 INJECTION, POWDER, FOR SOLUTION INTRAVENOUS at 07:39

## 2022-11-07 RX ADMIN — SUGAMMADEX 200 MG: 100 INJECTION, SOLUTION INTRAVENOUS at 11:46

## 2022-11-07 RX ADMIN — FENTANYL CITRATE 50 MCG: 50 INJECTION, SOLUTION INTRAMUSCULAR; INTRAVENOUS at 10:22

## 2022-11-07 RX ADMIN — ONDANSETRON 4 MG: 2 INJECTION INTRAMUSCULAR; INTRAVENOUS at 09:01

## 2022-11-07 RX ADMIN — ROCURONIUM BROMIDE 20 MG: 10 INJECTION, SOLUTION INTRAVENOUS at 08:37

## 2022-11-07 RX ADMIN — PROPOFOL 150 MG: 10 INJECTION, EMULSION INTRAVENOUS at 07:36

## 2022-11-07 RX ADMIN — LIDOCAINE HYDROCHLORIDE 0.5 ML: 10 INJECTION, SOLUTION EPIDURAL; INFILTRATION; INTRACAUDAL; PERINEURAL at 06:50

## 2022-11-07 RX ADMIN — EPHEDRINE SULFATE 10 MG: 50 INJECTION INTRAVENOUS at 08:25

## 2022-11-07 RX ADMIN — FENTANYL CITRATE 100 MCG: 50 INJECTION, SOLUTION INTRAMUSCULAR; INTRAVENOUS at 07:36

## 2022-11-07 RX ADMIN — GABAPENTIN 100 MG: 100 CAPSULE ORAL at 22:42

## 2022-11-07 RX ADMIN — PROPOFOL 50 MG: 10 INJECTION, EMULSION INTRAVENOUS at 07:45

## 2022-11-07 RX ADMIN — ATORVASTATIN CALCIUM 20 MG: 20 TABLET, FILM COATED ORAL at 22:42

## 2022-11-07 NOTE — PLAN OF CARE
Goal Outcome Evaluation:  Plan of Care Reviewed With: patient        Progress: no change  Outcome Evaluation: Pt admit to floor from PACU. VSS. RA. Complaints of abdominal discomfort and incisonal pain. Scheduled tylenol given. Family at bedside. SHERLY drain in place with bloody output. Leal catheter in place with pink tinged urine output. Fluids infusing. Lap sites clean and dry with no drainage. Pt resting comfortably. No further complaints at this time.

## 2022-11-07 NOTE — CONSULTS
[facility]  CONSULT NOTE      Patient Name: Greta Marino  : 1955  MRN: [id number]    Primary Care Physician: Alia Vasquez APRN  Provider requesting consultation: Shahid Lopez MD    Subjective   Subjective     Reason for Consultation:  Medical management HTN, HLD, and arthritis.     HPI:  Greta Marino is a 67 y.o. female presented to [facility] as a direct admit to Dr. Lopez for a pyeloplasty laparoscopic. Hospital medicine service was consulted for medical management.     Review of Systems  Gen- No fevers, chills  CV- No chest pain, palpitations  Resp- No cough, dyspnea  GI- No N/V/D, +surgical abd pain  All systems have been reviewed and the pertinent positives and negatives are listed above in ROS or HPI.    Personal History     Past Medical History:   Diagnosis Date   • Arthritis    • Asthma    • Coronary artery disease    • Heart murmur    • Heart valve disease     aortic valve replaced -    • History of mammography, screening     Done in [place]    • History of Papanicolaou smear of cervix     Done in [place]   • History of shingles    • Hyperlipidemia    • Hypertension    • Kidney stones    • Non-functioning kidney     RIGHT   • Pancreatic cyst    • Wears glasses        Past Surgical History:   Procedure Laterality Date   • ANKLE SURGERY Left     ORIF   • AORTIC VALVE REPAIR/REPLACEMENT N/A 2022    Procedure: TRANSCATHETER AORTIC VALVE REPLACEMENT;  Surgeon: Jaylan Mckay MD;  Location: [facility];  Service: Cardiothoracic;  Laterality: N/A;  flouro 150  dose 16ML  contrast 102 MgY   • AORTIC VALVE REPAIR/REPLACEMENT N/A 2022    Procedure: Transfemoral Transcatheter Aortic Valve Replacement;  Surgeon: Dorian Camara MD;  Location: [facility];  Service: Cardiovascular;  Laterality: N/A;   • BREAST BIOPSY Right    • CARDIAC CATHETERIZATION Left 2022    Procedure: Left Heart Cath;   Surgeon: Jaylan Redd MD;  Location: [facility] CATH INVASIVE LOCATION;  Service: Cardiovascular;  Laterality: Left;  to be scheduled in next 1-2 months   • COLONOSCOPY  8/24/2020, 2005    8/24/2020- Dr. Bowling. 2005-Dr. Spence in [place], [place]   • CYSTOSCOPY, URETEROSCOPY, RETROGRADE PYELOGRAM, STONE EXTRACTION, STENT INSERTION Right 09/16/2022    Procedure: URETEROSCOPY, RETROGRADE PYELOGRAM, STENT INSERTION - RIGHT;  Surgeon: Shahid Lopez MD;  Location: [facility] OR;  Service: Urology;  Laterality: Right;   • KNEE SURGERY Right 12/20/2018    meniscus repair   • TRANSESOPHAGEAL ECHOCARDIOGRAM (SOURAV)     • TRANSESOPHAGEAL ECHOCARDIOGRAM (SOURAV) N/A 07/28/2022    Procedure: TRANSESOPHAGEAL ECHOCARDIOGRAM;  Surgeon: Jaylan Mckay MD;  Location: [facility] HYBRID JUNO;  Service: Cardiothoracic;  Laterality: N/A;       Family History: family history includes Arthritis in her mother; Cancer in her father and mother; Colon cancer in her father; Diabetes in her brother and father; Heart attack in her father; Hypertension in her brother, brother, and father; Pancreatic cancer in her mother; Sarcoidosis in her daughter; Stroke in her brother. Otherwise pertinent FHx was reviewed and unremarkable.     Social History:  reports that she has never smoked. She has never used smokeless tobacco. She reports current alcohol use. She reports that she does not use drugs.    Medications:  NIFEdipine CC, Vitamin D3, aspirin, benazepril, simvastatin, and vitamin B-12    Scheduled Meds:acetaminophen, 650 mg, Oral, Q6H   Or  acetaminophen, 650 mg, Oral, Q6H   Or  acetaminophen, 650 mg, Rectal, Q6H  atorvastatin, 20 mg, Oral, Nightly  gabapentin, 100 mg, Oral, TID  heparin (porcine), 5,000 Units, Subcutaneous, Q8H  methocarbamol, 750 mg, Oral, 4x Daily  sennosides-docusate, 2 tablet, Oral, BID  sodium chloride, 10 mL, Intravenous, Q12H  vitamin B-12, 1,000 mcg, Oral, Daily      Continuous Infusions:lactated ringers, 9 mL/hr, Last Rate: 50  mL/hr (11/07/22 0846)  lactated ringers, 100 mL/hr, Last Rate: 100 mL/hr (11/07/22 1313)  Scopolamine, 1 patch      PRN Meds:.•  ondansetron **OR** ondansetron  •  sodium chloride    Allergies   Allergen Reactions   • Peanut-Containing Drug Products Hives, Shortness Of Breath and Swelling       Objective   Objective     Vital Signs:   Temp:  [97 °F (36.1 °C)-98.3 °F (36.8 °C)] 97 °F (36.1 °C)  Heart Rate:  [72-93] 79  Resp:  [16-18] 18  BP: (136-149)/(66-81) 141/81  Flow (L/min):  [2-4] 2  FiO2 (%):  [55 %-100 %] 56 %    Physical Exam  Constitutional: Alert, WD, WN female in NAD  Eyes:, EOMI, sclerae anicteric, no conjunctival injection  Head: NCAT  ENT: [place], moist mucous membranes   Respiratory: Nonlabored, symmetrical chest expansion, CTAB, 98% RA  Cardiovascular: RRR, HR 80, no M/R/G, +DP pulses bilaterally  Gastrointestinal: Soft, TTP, mildly distended, +hypoactive BS  Musculoskeletal: ROY; no LE edema bilaterally  Neurologic: Oriented x4, strength symmetric in all extremities, follows all commands, speech clear  Skin: No rashes on exposed skin  Psychiatric: Pleasant and cooperative; normal affect      Result Review:  I have personally reviewed the results from the time of admission and agree with these findings:  [x]  Laboratory  []  Radiology  []  EKG/Telemetry   []  Pathology  []  Old records  []  Other:  Most notable findings include:    LAB RESULTS:      Lab 11/04/22  0832   WBC 4.43   HEMOGLOBIN 13.7   HEMATOCRIT 40.9   PLATELETS 172   MCV 93.6         Lab 11/04/22  0834 11/04/22  0832   SODIUM  --  139   POTASSIUM  --  4.1   CHLORIDE  --  104   CO2  --  24.0   ANION GAP  --  11.0   BUN  --  16   CREATININE  --  0.69   EGFR  --  95.9   GLUCOSE  --  99   CALCIUM  --  9.7   HEMOGLOBIN A1C 4.80  --                      Lab 11/04/22  0832   ABO TYPING A   RH TYPING Negative   ANTIBODY SCREEN Negative         Brief Urine Lab Results  (Last result in the past 365 days)      Color   Clarity   Blood   Leuk Est    Nitrite   Protein   CREAT   Urine HCG        04/26/22 1417 Yellow   Clear   3+   Small (1+)   Negative   Negative               Microbiology Results (last 10 days)     ** No results found for the last 240 hours. **          No radiology results from the last 24 hrs    Results for orders placed during the hospital encounter of 08/23/22    Adult Transthoracic Echo Complete W/ Cont if Necessary Per Protocol    Interpretation Summary  · Estimated left ventricular EF = 60%  · There is a TAVR valve present. Mild paravalvular regurgitation is present in the prosthetic aortic valve.  · Mild to moderate mitral valve regurgitation is present.      Assessment & Plan   Assessment & Plan     Active Hospital Problems    Diagnosis  POA   • **Ureteropelvic junction (UPJ) obstruction, right [N13.5]  Yes   • History of transcatheter aortic valve replacement (TAVR) [Z95.2]  Not Applicable   • Nephrolithiasis [N20.0]  Yes   • Essential hypertension [I10]  Yes   • Mixed hyperlipidemia [E78.2]  Yes   • Arthritis [M19.90]  Yes      Resolved Hospital Problems   No resolved problems to display.     UPJ Obstruction  Nephroliathiasis  --S/p pyeloplasty laparoscopic per Dr. Lopez on 11/7/22.   --Pain control  --AM Labs    HTN  HLD  Arthritis  --Continue home medications with exception of ASA and vitamin D      History of TAVR      Thank you for allowing [facility] to provide consultative care for your patient, we will continue to follow while clinically appropriate.    Alyssa Ford, LIBBY  11/07/22

## 2022-11-07 NOTE — INTERVAL H&P NOTE
"[facility] Pre-op    Full history and physical note from office is attached.    /77 (BP Location: Right arm, Patient Position: Lying)   Pulse 93   Temp 97.8 °F (36.6 °C) (Temporal)   Resp 16   Ht 147.3 cm (58\")   Wt 55.3 kg (122 lb)   SpO2 98%   BMI 25.50 kg/m²     Immunizations:  Influenza:  2022  Pneumococcal:  UTD  Tetanus:  UTD  Covid x4: 2022      LAB Results:  Lab Results   Component Value Date    WBC 4.43 11/04/2022    HGB 13.7 11/04/2022    HCT 40.9 11/04/2022    MCV 93.6 11/04/2022     11/04/2022    NEUTROABS 2.86 07/26/2022    GLUCOSE 99 11/04/2022    BUN 16 11/04/2022    CREATININE 0.69 11/04/2022    EGFRIFNONA 87 03/24/2021    EGFRIFAFRI 106 03/24/2021     11/04/2022    K 4.1 11/04/2022     11/04/2022    CO2 24.0 11/04/2022    MG 2.1 07/29/2022    CALCIUM 9.7 11/04/2022    ALBUMIN 4.70 07/26/2022    AST 18 07/26/2022    ALT 11 07/26/2022    BILITOT 0.7 07/26/2022    PTT 26.2 07/28/2022    INR 0.91 07/28/2022       Cancer Staging (if applicable)  Cancer Patient: __ yes __no __unknown__N/A; If yes, clinical stage T:__ N:__M:__, stage group or __N/A      Impression: Ureteropelvic junction obstruction, right       Plan: PYELOPLASTY LAPAROSCOPIC WITH DAVINCI ROBOT      LIBBY La   11/7/2022   06:55 EST   "

## 2022-11-07 NOTE — ANESTHESIA POSTPROCEDURE EVALUATION
Patient: Greta Marino    Procedure Summary     Date: 11/07/22 Room / [facility]:  [facility]    Anesthesia Start: 0729 Anesthesia Stop:     Procedure: PYELOPLASTY LAPAROSCOPIC WITH DAVINCI ROBOT (Right: Abdomen) Diagnosis:       Ureteropelvic junction (UPJ) obstruction, right      (Ureteropelvic junction (UPJ) obstruction, right [N13.5])    Surgeons: Shahid Lopez MD Provider: Domenico Ojeda MD    Anesthesia Type: general ASA Status: 3          Anesthesia Type: general    Vitals  No vitals data found for the desired time range.          Post Anesthesia Care and Evaluation    Patient location during evaluation: PACU  Patient participation: complete - patient participated  Level of consciousness: awake and alert  Pain management: adequate    Airway patency: patent  Anesthetic complications: No anesthetic complications  PONV Status: none  Cardiovascular status: hemodynamically stable and acceptable  Respiratory status: nonlabored ventilation, acceptable and nasal cannula  Hydration status: acceptable

## 2022-11-07 NOTE — ANESTHESIA PROCEDURE NOTES
Airway  Urgency: elective    Date/Time: 11/7/2022 7:38 AM  Airway not difficult    General Information and Staff    Patient location during procedure: OR  RANDALL/CAA: Madhuri Akins CRNA    Indications and Patient Condition  Indications for airway management: airway protection    Preoxygenated: yes  MILS not maintained throughout  Mask difficulty assessment: 1 - vent by mask    Final Airway Details  Final airway type: endotracheal airway      Successful airway: ETT  Cuffed: yes   Successful intubation technique: video laryngoscopy  Facilitating devices/methods: intubating stylet  Endotracheal tube insertion site: oral  Blade: Meyers  Blade size: 3  ETT size (mm): 7.0  Cormack-Lehane Classification: grade I - full view of glottis  Placement verified by: chest auscultation and capnometry   Measured from: lips  ETT/EBT  to lips (cm): 20  Number of attempts at approach: 1  Assessment: lips, teeth, and gum same as pre-op and atraumatic intubation    Additional Comments  Negative epigastric sounds, Breath sound equal bilaterally with symmetric chest rise and fall

## 2022-11-08 ENCOUNTER — READMISSION MANAGEMENT (OUTPATIENT)
Dept: CALL CENTER | Facility: HOSPITAL | Age: 67
End: 2022-11-08

## 2022-11-08 VITALS
BODY MASS INDEX: 25.61 KG/M2 | DIASTOLIC BLOOD PRESSURE: 83 MMHG | HEIGHT: 58 IN | SYSTOLIC BLOOD PRESSURE: 137 MMHG | RESPIRATION RATE: 16 BRPM | OXYGEN SATURATION: 98 % | TEMPERATURE: 97.8 F | WEIGHT: 122 LBS | HEART RATE: 72 BPM

## 2022-11-08 LAB
ANION GAP SERPL CALCULATED.3IONS-SCNC: 8 MMOL/L (ref 5–15)
BUN SERPL-MCNC: 9 MG/DL (ref 8–23)
BUN/CREAT SERPL: 14.8 (ref 7–25)
CALCIUM SPEC-SCNC: 8.8 MG/DL (ref 8.6–10.5)
CHLORIDE SERPL-SCNC: 103 MMOL/L (ref 98–107)
CO2 SERPL-SCNC: 24 MMOL/L (ref 22–29)
CREAT SERPL-MCNC: 0.61 MG/DL (ref 0.57–1)
CYTO UR: NORMAL
DEPRECATED RDW RBC AUTO: 41.7 FL (ref 37–54)
EGFRCR SERPLBLD CKD-EPI 2021: 98.7 ML/MIN/1.73
ERYTHROCYTE [DISTWIDTH] IN BLOOD BY AUTOMATED COUNT: 11.9 % (ref 12.3–15.4)
GLUCOSE SERPL-MCNC: 90 MG/DL (ref 65–99)
HCT VFR BLD AUTO: 32.3 % (ref 34–46.6)
HGB BLD-MCNC: 10.9 G/DL (ref 12–15.9)
LAB AP CASE REPORT: NORMAL
LAB AP CLINICAL INFORMATION: NORMAL
MCH RBC QN AUTO: 32.3 PG (ref 26.6–33)
MCHC RBC AUTO-ENTMCNC: 33.7 G/DL (ref 31.5–35.7)
MCV RBC AUTO: 95.8 FL (ref 79–97)
PATH REPORT.FINAL DX SPEC: NORMAL
PATH REPORT.GROSS SPEC: NORMAL
PLATELET # BLD AUTO: 146 10*3/MM3 (ref 140–450)
PMV BLD AUTO: 9.7 FL (ref 6–12)
POTASSIUM SERPL-SCNC: 3.8 MMOL/L (ref 3.5–5.2)
RBC # BLD AUTO: 3.37 10*6/MM3 (ref 3.77–5.28)
SODIUM SERPL-SCNC: 135 MMOL/L (ref 136–145)
WBC NRBC COR # BLD: 6.38 10*3/MM3 (ref 3.4–10.8)

## 2022-11-08 PROCEDURE — 80048 BASIC METABOLIC PNL TOTAL CA: CPT | Performed by: UROLOGY

## 2022-11-08 PROCEDURE — G0378 HOSPITAL OBSERVATION PER HR: HCPCS

## 2022-11-08 PROCEDURE — 85027 COMPLETE CBC AUTOMATED: CPT | Performed by: UROLOGY

## 2022-11-08 PROCEDURE — 25010000002 HEPARIN (PORCINE) PER 1000 UNITS: Performed by: UROLOGY

## 2022-11-08 RX ORDER — TAMSULOSIN HYDROCHLORIDE 0.4 MG/1
1 CAPSULE ORAL DAILY
Qty: 14 CAPSULE | Refills: 0 | Status: SHIPPED | OUTPATIENT
Start: 2022-11-08 | End: 2022-11-22

## 2022-11-08 RX ORDER — AMOXICILLIN 250 MG
2 CAPSULE ORAL DAILY
Qty: 14 TABLET | Refills: 0 | Status: SHIPPED | OUTPATIENT
Start: 2022-11-08 | End: 2022-11-15

## 2022-11-08 RX ORDER — METHOCARBAMOL 500 MG/1
500 TABLET, FILM COATED ORAL 3 TIMES DAILY
Qty: 15 TABLET | Refills: 0 | Status: SHIPPED | OUTPATIENT
Start: 2022-11-08 | End: 2022-11-13

## 2022-11-08 RX ORDER — PHENAZOPYRIDINE HYDROCHLORIDE 200 MG/1
200 TABLET, FILM COATED ORAL 3 TIMES DAILY PRN
Qty: 20 TABLET | Refills: 0 | Status: SHIPPED | OUTPATIENT
Start: 2022-11-08 | End: 2022-12-02

## 2022-11-08 RX ORDER — NITROFURANTOIN 25; 75 MG/1; MG/1
100 CAPSULE ORAL 2 TIMES DAILY
Qty: 14 CAPSULE | Refills: 0 | Status: SHIPPED | OUTPATIENT
Start: 2022-11-08 | End: 2022-11-15

## 2022-11-08 RX ADMIN — SENNOSIDES AND DOCUSATE SODIUM 2 TABLET: 50; 8.6 TABLET ORAL at 08:12

## 2022-11-08 RX ADMIN — HEPARIN SODIUM 5000 UNITS: 5000 INJECTION INTRAVENOUS; SUBCUTANEOUS at 13:32

## 2022-11-08 RX ADMIN — CYANOCOBALAMIN TAB 1000 MCG 1000 MCG: 1000 TAB at 08:12

## 2022-11-08 RX ADMIN — GABAPENTIN 100 MG: 100 CAPSULE ORAL at 08:12

## 2022-11-08 RX ADMIN — METHOCARBAMOL 750 MG: 750 TABLET ORAL at 11:16

## 2022-11-08 RX ADMIN — ACETAMINOPHEN 650 MG: 325 TABLET, FILM COATED ORAL at 08:11

## 2022-11-08 RX ADMIN — ACETAMINOPHEN 650 MG: 325 TABLET, FILM COATED ORAL at 13:33

## 2022-11-08 RX ADMIN — ACETAMINOPHEN 650 MG: 325 TABLET, FILM COATED ORAL at 03:16

## 2022-11-08 RX ADMIN — LISINOPRIL 40 MG: 40 TABLET ORAL at 08:12

## 2022-11-08 RX ADMIN — HEPARIN SODIUM 5000 UNITS: 5000 INJECTION INTRAVENOUS; SUBCUTANEOUS at 05:41

## 2022-11-08 RX ADMIN — NIFEDIPINE 60 MG: 60 TABLET, EXTENDED RELEASE ORAL at 08:12

## 2022-11-08 RX ADMIN — METHOCARBAMOL 750 MG: 750 TABLET ORAL at 08:12

## 2022-11-08 RX ADMIN — Medication 10 ML: at 08:13

## 2022-11-08 RX ADMIN — SODIUM CHLORIDE, POTASSIUM CHLORIDE, SODIUM LACTATE AND CALCIUM CHLORIDE 100 ML/HR: 600; 310; 30; 20 INJECTION, SOLUTION INTRAVENOUS at 08:16

## 2022-11-08 NOTE — OP NOTE
PYELOPLASTY LAPAROSCOPIC WITH DAVINCI ROBOT, PYELOLITHOTOMY LAPAROSCOPIC WITH DAVINCI ROBOT, CYSTOSCOPY URETERAL CATHETER/STENT INSERTION  Procedure Report    Patient Name:  Greta Marino  YOB: 1955    Date of Surgery:  11/7/2022     Indications: 67-year-old female with known right ureteropelvic junction obstruction.  She has undergone work-up with cross-sectional CT imaging, nuclear medicine renogram, diagnostic ureteroscopy and stent placement.  Patient has confirmed narrowing at the right ureteropelvic junction, nuclear medicine study demonstrating differential function of approximately 25%, T1 half of 36 minutes, delayed nature.  After discussion of management the patient has elected to proceed with robotic pyeloplasty.  She has known nonobstructing renal pelvis and upper pole stones.  We have discussed the indication for stone removal at the time of procedure.  The risk benefits and alternatives to procedure were discussed the patient she elects to proceed    Pre-op Diagnosis:   Ureteropelvic junction (UPJ) obstruction, right [N13.5]       Post-Op Diagnosis Codes:     * Ureteropelvic junction (UPJ) obstruction, right [N13.5]    Procedure/CPT® Codes:      Procedure(s):  CYSTOSCOPY  RIGHT URETERAL STENT REMOVAL  RIGHT LAPAROSCOPIC ROBOTIC PYELOPLASTY   RIGHT LAPAROSCOPIC PYELOLITHOTOMY  RIGHT PYELOSCOPY, BASKET STONE EXTRACTION  RIGHT URETERAL STENT INSERTION  VARGAS CATHETER INSERTION    Staff:  Surgeon(s):  Shahid Lopez MD    Assistant Surgeon  Willis Bell MD    [place] [id number]-  Willis Bell MD was responsible for performing the following activities: Retraction, Suction, Irrigation, Suturing, Closing and Held/Positioned Camera and their skilled assistance was necessary for the success of this case.      Anesthesia: General    Estimated Blood Loss: 50 mL    Implants:    Implant Name Type Inv. Item Serial No.  Lot No. LRB No. Used Action   CLIP LIG HALEY PA LG 6CT [facility] -  [id number] Implant CLIP LIG BENJIEJAMES PA LG 6CT PRP  [employer]  Right 1 Implanted   STNT PERCUFLX NO GW 6X24 - [id number] Stent STNT PERCUFLX NO GW 6X24  [employer] SONIA [id number] Right 1 Implanted   SEAL FIBRIN TISSEEL FZ 4ML - [id number] - [id number] Implant SEAL FIBRIN TISSEEL FZ 4ML [id number] [employer] [id number] Right 1 Implanted       Specimen:          Specimens     ID Source Type Tests Collected By Collected At Frozen?    1 Kidney, Right Calculus · STONE ANALYSIS   Shahid Lopez MD 11/7/22 1121     A Ureteropelvic Junction Tissue · TISSUE PATHOLOGY EXAM   Shahid Lopez MD 11/7/22 1115 No              Findings:   1.  Normal urinary bladder without evidence of tumor stone or foreign body  2.  Right ureteral stent removal, wire access gained to the right renal collecting system  3.  Skeletonization of the right mid ureter/proximal ureter/renal pelvis.  Renal hilum identified and preserved.  4.  Renal pelvis was incised, laparoscopic pyelolithotomy performed for clearance of known renal, pyeloscopy with basket removal of upper pole stone fragments  5.  Successful dismembered pyeloplasty, off tension reconstruction, watertight  6.  Successful placement of 6 Lao by 24 cm double-J ureteral stent without strings-proximal curl visualized during procedure, distal curl visualized at the conclusion via cystoscopy  7.  16 Lao Leal catheter placement    Complications: None immediate    Description of Procedure:     The patient was identified in the pre-operative holding area where she was marked and informed consent was confirmed.  The patient was brought to the operating room per anesthesia and placed supine. Bilateral lower extremity compression devices were placed. After adequate general endotracheal anesthesia was established, the genitals were prepped and draped in the standard surgical fashion. A time-out confirmed the correct patient, procedure, and site.     A flexible  cystoscope was then inserted through the urethra and into the bladder.  The prior indwelling ureteral stent was removed, A sensor wire was placed and advanced to the renal pelvis. The cystoscope was then removed. A 16-[place] citizen Leal catheter was placed.Once the cystoscopy portion of the procedure was done, we then placed the patient on the operating room table.    The patient then placed in lateral decubitus position with right side up. The patient was on a beanbag with an axillary roll placed. All pressure points were padded appropriately and the patient was secured to the table with tape. The patient was prepped and draped in standard surgical fashion. A repeat time-out confirmed the correct patient, procedure, and site. The patient received perioperative IV antibiotics.    In the right upper quadrant, a stab incision was made and Veress needle was placed intraperitoneally. lntraperitoneal placement was confirmed with a saline drop test. A pneumoperitoneum of 15 mm Hg pressure was created. Next, an 8mm trocar was placed . Direct visualization with robotic camera confirmed good intraperitoneal placement with no damage to the surrounding viscera. We placed our 8-mm robotic trocars in a triangular fashion on the lateral sidewall, placed a 12-mm assistant trocar in the supraumbilical midline. All trocars were placed under vision. The robotic platform was then docked to the patient.    A 5 port was placed near the xiphoid, used as a liver retractor.    The white line of Toldt was identified and incised. This allowed the right colon to be reflected medially. The ureter was then identified and traced up towards the ureteropelvic junction. We then were able to free up the renal pelvis. The ureter was high-inserting and there was a thick rind surrounding the pelvis, but we were able to free this completely.    We then incised the ureteropelvic junction. We took an elliptical piece of the ureteropelvic junction and sent it  off to Pathology as right uteropelvic junction for permanent section.     The patient had known renal pelvis and upper pole stones.  Using a robotic instruments we are able identify the renal pelvis and stone.  Once clearance was confirmed a flexible cystoscope was inserted to our 12 mm port.  This was advanced in the renal pelvis, this was then used to pass to the upper pole.  A 1.9 basket was inserted.  Multiple upper pole stones were identified.  These were grasped and removed intact with a 1.9 basket.  Once confirmation of all stones cleared he then proceeded to our dismembered pyeloplasty.      We spatulated the proximal ureter laterally for approximately 2 cm. Next, using interrupted 3-0 Vicryl sutures, we re-anastomosed the ureter to the renal pelvis. This was a wide open anastomosis that was tension free. After doing the posterior half, we placed  a 6French x 24-cm double-J ureteral stent. Proximal coil was seen to coil in the renal pelvis. Cystoscopy was performed at that time to confirm the stent's location in the bladder. We then completed the anastomosis. It appeared watertight and tension free.    One last inspection revealed good hemostasis with no damage to the surrounding viscera. We placed 5 ml of Tisseel near the repair. We then began our closure.    A 10 Romansh flat SHERLY drain was placed through our fourth arm.    The 12-mm trocar site was closed with an O Vicryl suture on a Ronald-Natasha inlet closure device. All trocars were then removed under direct vision and the pneumoperitoneum was released.     We then closed skin with a 4-0 monocryl subcuticular stitch followed by Dermabond skin glue.    The patient tolerated the procedure well. There were no complications. Staff was present and scrubbed for the entire procedure.      Disposition:  The patient remained admitted overnight.  We will obtain postoperative labs.  She will continue ureteral stent in place for 4 to 6 weeks.  SHERLY drain and Leal  catheter will be removed in 24 hours.      Shahid Lopez MD     Date: 11/7/2022  Time: 20:43 EST

## 2022-11-08 NOTE — PLAN OF CARE
Goal Outcome Evaluation:  Plan of Care Reviewed With: patient        Progress: improving  Outcome Evaluation: VSS. RA. No complaints of pain. Leal catheter removed and SHERLY drain removed by MD. Pt ambulating frequently. Lap sites clean and dry with no drainage. Pt resting comfortably. No further complaints at this time.

## 2022-11-08 NOTE — OUTREACH NOTE
Prep Survey    Flowsheet Row Responses   [facility] facility patient discharged from? [facility]   Is LACE score < 7 ? Yes   Emergency Room discharge w/ pulse ox? No   Eligibility [facility]   Date of Admission 11/07/22   Date of Discharge 11/08/22   Discharge Disposition Home or Self Care   Discharge diagnosis Pyeloplasty lap with davinci robot   Does the patient have one of the following disease processes/diagnoses(primary or secondary)? General Surgery   Does the patient have Home health ordered? No   Is there a DME ordered? No   Prep survey completed? Yes          VINCE AVERY - Registered Nurse

## 2022-11-08 NOTE — DISCHARGE SUMMARY
Inpatient Discharge Summary      Patient Name: Greta Marino  : 1955   MRN: [id number]   Admission Date: 2022  6:07 AM   Date of Discharge: 2022     Discharge Diagnosis: Right ureteropelvic junction obstruction    Care Team: Patient Care Team:  Alia Vasquez APRN as PCP - General (Family Medicine)  Jaylan Mckay MD as Surgeon (Cardiothoracic Surgery)  Jaylan Redd MD as Consulting Physician (Cardiology)  Jaquelin Dominique APRN as Nurse Practitioner (Cardiology)  Steven Bowling MD as Consulting Physician (Gastroenterology)  Shahid Lopez MD as Consulting Physician (Urology)  Hayley Mckoen APRN as Nurse Practitioner (Gastroenterology)    History of Present Illness: Greta Marino is a 67 y.o. female who is being discharged today.  The patient has history of right ureteropelvic junction obstruction, she underwent right robotic laparoscopic pyeloplasty, pyelolithotomy    Hospital Course: The patient underwent right robotic laparoscopic pyeloplasty/pyelolithotomy on 2022.  In the postoperative setting she is doing very well.  Her pain is controlled.  She has return of bowel function with flatus.  She is tolerating clear liquid diet, no nausea or emesis.  She has voided without catheter.  Ureteral stent remains indwelling, remain indwelling at discharge.  She has been ambulating without difficulty.    Discharge Medications:      Discharge Medications      New Medications      Instructions Start Date   methocarbamol 500 MG tablet  Commonly known as: Robaxin   500 mg, Oral, 3 Times Daily      nitrofurantoin (macrocrystal-monohydrate) 100 MG capsule  Commonly known as: Macrobid   100 mg, Oral, 2 Times Daily      phenazopyridine 200 MG tablet  Commonly known as: Pyridium   200 mg, Oral, 3 Times Daily PRN      sennosides-docusate 8.6-50 MG per tablet  Commonly known as: PERICOLACE   2 tablets, Oral, Daily      tamsulosin 0.4 MG capsule 24 hr capsule  Commonly known as:  FLOMAX   0.4 mg, Oral, Daily         Continue These Medications      Instructions Start Date   Adult Aspirin Regimen 81 MG EC tablet  Generic drug: aspirin   81 mg, Oral, Daily      benazepril 40 MG tablet  Commonly known as: LOTENSIN   TAKE ONE TABLET BY MOUTH DAILY      NIFEdipine CC 60 MG 24 hr tablet  Commonly known as: ADALAT CC   60 mg, Oral, Daily      simvastatin 40 MG tablet  Commonly known as: ZOCOR   TAKE ONE TABLET BY MOUTH ONCE NIGHTLY      vitamin B-12 1000 MCG tablet  Commonly known as: CYANOCOBALAMIN   1,000 mcg, Oral, Daily      Vitamin D3 10 MCG (400 UNIT) capsule   4,000 Units, Oral, Daily             Allergies:   Allergies   Allergen Reactions   • Peanut-Containing Drug Products Hives, Shortness Of Breath and Swelling       Physical Exam:   Vital Signs:   Vitals:    11/08/22 0711 11/08/22 0800 11/08/22 0811 11/08/22 1111   BP: 121/66   137/83   BP Location: Left arm   Left arm   Patient Position: Lying   Lying   Pulse: 67  72    Resp: 18   16   Temp: 98.3 °F (36.8 °C)   97.8 °F (36.6 °C)   TempSrc: Oral   Oral   SpO2: 96% 98%  100%   Weight:       Height:         Body mass index is 25.5 kg/m².         Physical Exam  Vitals and nursing note reviewed.   Constitutional:       Appearance: Normal appearance.   HENT:      Head: Normocephalic and atraumatic.   Cardiovascular:      Comments: Well perfused  Pulmonary:      Effort: Pulmonary effort is normal.   Abdominal:      General: Abdomen is flat.      Palpations: Abdomen is soft, appropriately tender to palpation.  Incisions are clean dry and intact.  SHERLY drain in place with minimal serosanguineous output, removed intact.  Musculoskeletal:         General: Normal range of motion.   Skin:     General: Skin is warm and dry.   Neurological:      General: No focal deficit present.      Mental Status: alert and oriented to person, place, and time. Mental status is at baseline.   Psychiatric:         Mood and Affect: Mood normal.         Behavior: Behavior  normal.         Thought Content: Thought content normal.         Judgment: Judgment normal.       Pertinent Test Results:   Lab Results (all)     Procedure Component Value Units Date/Time    Basic Metabolic Panel [[id number]]  (Abnormal) Collected: 11/08/22 0726    Specimen: Blood Updated: 11/08/22 0817     Glucose 90 mg/dL      BUN 9 mg/dL      Creatinine 0.61 mg/dL      Sodium 135 mmol/L      Potassium 3.8 mmol/L      Comment: Slight hemolysis detected by analyzer. Results may be affected.        Chloride 103 mmol/L      CO2 24.0 mmol/L      Calcium 8.8 mg/dL      BUN/Creatinine Ratio 14.8     Anion Gap 8.0 mmol/L      eGFR 98.7 mL/min/1.73      Comment: National Kidney Foundation and American Society of Nephrology (ASN) Task Force recommended calculation based on the Chronic Kidney Disease Epidemiology Collaboration (CKD-EPI) equation refit without adjustment for race.       Narrative:      GFR Normal >60  Chronic Kidney Disease <60  Kidney Failure <15      CBC (No Diff) [[id number]]  (Abnormal) Collected: 11/08/22 0726    Specimen: Blood Updated: 11/08/22 0753     WBC 6.38 10*3/mm3      RBC 3.37 10*6/mm3      Hemoglobin 10.9 g/dL      Hematocrit 32.3 %      MCV 95.8 fL      MCH 32.3 pg      MCHC 33.7 g/dL      RDW 11.9 %      RDW-SD 41.7 fl      MPV 9.7 fL      Platelets 146 10*3/mm3     Basic Metabolic Panel [[id number]]  (Abnormal) Collected: 11/07/22 1333    Specimen: Blood Updated: 11/07/22 1516     Glucose 148 mg/dL      BUN 14 mg/dL      Creatinine 0.80 mg/dL      Sodium 139 mmol/L      Potassium 4.4 mmol/L      Comment: Slight hemolysis detected by analyzer. Results may be affected.        Chloride 105 mmol/L      CO2 19.0 mmol/L      Calcium 9.2 mg/dL      BUN/Creatinine Ratio 17.5     Anion Gap 15.0 mmol/L      eGFR 81.4 mL/min/1.73      Comment: National Kidney Foundation and American Society of Nephrology (ASN) Task Force recommended calculation based on the Chronic Kidney Disease Epidemiology  Collaboration (CKD-EPI) equation refit without adjustment for race.       Narrative:      GFR Normal >60  Chronic Kidney Disease <60  Kidney Failure <15      CBC & Differential [[id number]]  (Abnormal) Collected: 11/07/22 1333    Specimen: Blood Updated: 11/07/22 1507    Narrative:      The following orders were created for panel order CBC & Differential.  Procedure                               Abnormality         Status                     ---------                               -----------         ------                     CBC Auto Differential[[id number]]        Abnormal            Final result               Scan Slide[[id number]]                                       Final result                 Please view results for these tests on the individual orders.    Scan Slide [[id number]] Collected: 11/07/22 1333    Specimen: Blood Updated: 11/07/22 1507     RBC Morphology Normal     WBC Morphology Normal     Platelet Estimate Decreased     Clumped Platelets Present    Narrative:      Automated count may be inaccurate due to presence of platelet clumps. Platelets appear decreased upon review of peripheral smear.      CBC Auto Differential [[id number]]  (Abnormal) Collected: 11/07/22 1333    Specimen: Blood Updated: 11/07/22 1507     WBC 9.96 10*3/mm3      RBC 4.01 10*6/mm3      Hemoglobin 12.8 g/dL      Hematocrit 39.1 %      MCV 97.5 fL      MCH 31.9 pg      MCHC 32.7 g/dL      RDW 11.7 %      RDW-SD 42.1 fl      MPV 10.4 fL      Platelets 96 10*3/mm3      Neutrophil % 91.5 %      Lymphocyte % 6.5 %      Monocyte % 1.3 %      Eosinophil % 0.2 %      Basophil % 0.3 %      Immature Grans % 0.2 %      Neutrophils, Absolute 9.11 10*3/mm3      Lymphocytes, Absolute 0.65 10*3/mm3      Monocytes, Absolute 0.13 10*3/mm3      Eosinophils, Absolute 0.02 10*3/mm3      Basophils, Absolute 0.03 10*3/mm3      Immature Grans, Absolute 0.02 10*3/mm3      nRBC 0.0 /100 WBC     STONE ANALYSIS - Calculus, Kidney, Right [[id number]]  Collected: 11/07/22 1121    Specimen: Calculus from Kidney, Right Updated: 11/07/22 1305    Tissue Pathology Exam [[id number]] Collected: 11/07/22 1115    Specimen: Tissue from Ureteropelvic Junction Updated: 11/07/22 1301          Imaging Results (All)     None          Discharge Disposition: Stable    Home or Self Care    Discharge Diet: GI soft        Activity at Discharge:         1. Continue to encourage ambulation  2. Patient may ambulate up stairs  3. Patient may shower in 24 hours, no soaking until incisions are healed, follow-up appointment in 2 weeks  4. Continue I-S  5. No lifting over 10 pounds for 4 weeks    Follow Up:   Future Appointments   Date Time Provider Department Center   12/5/2022  9:30 AM Alia Vasquez APRN [facility]   3/20/2023  1:30 PM Jaylan Redd MD [facility]         Test Results Pending at Discharge:     Pending Labs     Order Current Status    STONE ANALYSIS - Calculus, Kidney, Right In process    Tissue Pathology Exam In process          Shahid Lopez MD  [facility] [place]   11/08/22  13:20 EST

## 2022-11-08 NOTE — PLAN OF CARE
Problem: Adult Inpatient Plan of Care  Goal: Plan of Care Review  Outcome: Ongoing, Progressing  Flowsheets (Taken 11/8/2022 0601)  Outcome Evaluation: VSS. CLAUDY Leal and SHERLY in place. No complaints of pain or nausea during the shift. Pt passing flatus. Pt resting through the night. No other concerns at this time. Will continue with the plan of care.   Goal Outcome Evaluation:              Outcome Evaluation: VSS. CLAUDY Leal and SHERLY in place. No complaints of pain or nausea during the shift. Pt passing flatus. Pt resting through the night. No other concerns at this time. Will continue with the plan of care.

## 2022-11-09 ENCOUNTER — APPOINTMENT (OUTPATIENT)
Dept: CARDIAC REHAB | Facility: HOSPITAL | Age: 67
End: 2022-11-09

## 2022-11-09 ENCOUNTER — TRANSITIONAL CARE MANAGEMENT TELEPHONE ENCOUNTER (OUTPATIENT)
Dept: CALL CENTER | Facility: HOSPITAL | Age: 67
End: 2022-11-09

## 2022-11-09 NOTE — OUTREACH NOTE
Call Center TCM Note    Flowsheet Row Responses   [facility] patient discharged from? [facility]   Does the patient have one of the following disease processes/diagnoses(primary or secondary)? General Surgery   TCM attempt successful? Yes   Call start time 1555   Call end time 1559   Discharge diagnosis Pyeloplasty lap with davinci robot   Person spoke with today (if not patient) and relationship Patient   Meds reviewed with patient/caregiver? Yes   Is the patient having any side effects they believe may be caused by any medication additions or changes? No   Does the patient have all medications related to this admission filled (includes all antibiotics, pain medications, etc.) Yes   Is the patient taking all medications as directed (includes completed medication regime)? Yes   Comments Urology fu appt on 11/14/22   Does the patient have an appointment with their PCP within 7 days of discharge? Yes  [11/15/22 at 3:45 PM]   Psychosocial issues? No   What is the patient's perception of their health status since discharge? Improving   Nursing interventions Nurse provided patient education   Is the patient /caregiver able to teach back basic post-op care? Take showers only when approved by MD-sponge bathe until then, No tub bath, swimming, or hot tub until instructed by MD, Keep incision areas clean,dry and protected, Lifting as instructed by MD in discharge instructions   Is the patient/caregiver able to teach back signs and symptoms of incisional infection? Increased redness, swelling or pain at the incisonal site, Increased drainage or bleeding, Incisional warmth, Pus or odor from incision, Fever   Is the patient/caregiver able to teach back steps to recovery at home? Rest and rebuild strength, gradually increase activity, Eat a well-balance diet   TCM call completed? Yes   Wrap up additional comments Pt states she is doing ok,  pt reports some nausea this AM. Pt denies fever, or increased redness, swelling,  warmth, drainage at incisional site. Pt verified PCP fu appt on 11/15/22, and Urology fu appt on 11/14/22. No questions/concerns.   Call end time [id number]   Would this patient benefit from a Referral to [facility] Social Work? No   Is the patient interested in additional calls from an ambulatory ?  NOTE:  applies to high risk patients requiring additional follow-up. No          Tennille Martinez RN    11/9/2022, 16:01 EST

## 2022-11-09 NOTE — OUTREACH NOTE
Call Center TCM Note    Flowsheet Row Responses   [facility] patient discharged from? [place]   Does the patient have one of the following disease processes/diagnoses(primary or secondary)? General Surgery   TCM attempt successful? No  [verbal release for Deangelo Marino, son]   Unsuccessful attempts Attempt 1   Call Status Left message          Terese Cunningham RN    11/9/2022, 13:34 EST

## 2022-11-11 ENCOUNTER — APPOINTMENT (OUTPATIENT)
Dept: CARDIAC REHAB | Facility: HOSPITAL | Age: 67
End: 2022-11-11

## 2022-11-12 LAB
CALCIUM OXALATE DIHYDRATE MFR STONE IR: 20 %
COLOR STONE: NORMAL
COM MFR STONE: 75 %
COMPN STONE: NORMAL
HYDROXYAPATITE: 5 %
LABORATORY COMMENT REPORT: NORMAL
LABORATORY COMMENT REPORT: NORMAL
Lab: NORMAL
Lab: NORMAL
PHOTO: NORMAL
SIZE STONE: NORMAL MM
SPEC SOURCE SUBJ: NORMAL
WT STONE: 731 MG

## 2022-11-14 ENCOUNTER — APPOINTMENT (OUTPATIENT)
Dept: CARDIAC REHAB | Facility: HOSPITAL | Age: 67
End: 2022-11-14

## 2022-11-14 ENCOUNTER — OFFICE VISIT (OUTPATIENT)
Dept: UROLOGY | Facility: CLINIC | Age: 67
End: 2022-11-14

## 2022-11-14 VITALS — HEIGHT: 58 IN | WEIGHT: 122 LBS | BODY MASS INDEX: 25.61 KG/M2

## 2022-11-14 DIAGNOSIS — N13.5 URETEROPELVIC JUNCTION (UPJ) OBSTRUCTION, RIGHT: ICD-10-CM

## 2022-11-14 DIAGNOSIS — N20.0 NEPHROLITHIASIS: Primary | ICD-10-CM

## 2022-11-14 PROCEDURE — 99024 POSTOP FOLLOW-UP VISIT: CPT | Performed by: UROLOGY

## 2022-11-14 NOTE — PROGRESS NOTES
Follow Up Office Visit      Patient Name: Greta Marino  : 1955   MRN: [id number]     Chief Complaint:    Chief Complaint   Patient presents with   • Ureteropelvic junction (UPJ) obstruction     History of Present Illness: Greta Marino is a 67 y.o. female who presents today for 1 week follow-up after robotic pyeloplasty/pyelolithotomy.  She is doing very well in the postoperative setting.  She denies abdominal pain.  She is tolerating diet without nausea or emesis.  She is ambulating without difficulty.  She reports voiding without difficulty, no lower urinary tract symptoms.  Continue to return of bowel function.    Subjective      Review of System: Review of Systems   Constitutional: Negative for chills, fatigue, fever and unexpected weight change.   HENT: Negative for sore throat.    Eyes: Negative for visual disturbance.   Respiratory: Negative for cough, chest tightness and shortness of breath.    Cardiovascular: Negative for chest pain and leg swelling.   Gastrointestinal: Negative for blood in stool, constipation, diarrhea, nausea, rectal pain and vomiting.   Genitourinary: Positive for hematuria. Negative for decreased urine volume, difficulty urinating, dysuria, enuresis, flank pain, frequency, genital sores and urgency.   Musculoskeletal: Negative for back pain and joint swelling.   Skin: Negative for rash and wound.   Neurological: Negative for seizures, speech difficulty, weakness and headaches.   Psychiatric/Behavioral: Negative for confusion, sleep disturbance and suicidal ideas. The patient is not nervous/anxious.       I have reviewed the ROS documented by my clinical staff, updated as appropriate and I agree. Shahid Lopez MD    I have reviewed and the following portions of the patient's history were updated as appropriate: past family history, past medical history, past social history, past surgical history and problem list.    Medications:     Current Outpatient Medications:  "  •  aspirin 81 MG EC tablet, Take 1 tablet by mouth Daily., Disp: 100 tablet, Rfl: 3  •  benazepril (LOTENSIN) 40 MG tablet, TAKE ONE TABLET BY MOUTH DAILY, Disp: 90 tablet, Rfl: 1  •  Cholecalciferol (Vitamin D3) 10 MCG (400 UNIT) capsule, Take 4,000 Units by mouth Daily., Disp: , Rfl:   •  NIFEdipine CC (ADALAT CC) 60 MG 24 hr tablet, Take 1 tablet by mouth Daily., Disp: 90 tablet, Rfl: 3  •  nitrofurantoin, macrocrystal-monohydrate, (Macrobid) 100 MG capsule, Take 1 capsule by mouth 2 (Two) Times a Day., Disp: 14 capsule, Rfl: 0  •  phenazopyridine (Pyridium) 200 MG tablet, Take 1 tablet by mouth 3 (Three) Times a Day As Needed for Bladder Spasms., Disp: 20 tablet, Rfl: 0  •  sennosides-docusate (senna-docusate sodium) 8.6-50 MG per tablet, Take 2 tablets by mouth Daily for 7 days., Disp: 14 tablet, Rfl: 0  •  simvastatin (ZOCOR) 40 MG tablet, TAKE ONE TABLET BY MOUTH ONCE NIGHTLY, Disp: 90 tablet, Rfl: 1  •  tamsulosin (FLOMAX) 0.4 MG capsule 24 hr capsule, Take 1 capsule by mouth Daily for 14 days., Disp: 14 capsule, Rfl: 0  •  vitamin B-12 (CYANOCOBALAMIN) 1000 MCG tablet, Take 1,000 mcg by mouth Daily., Disp: , Rfl:     Allergies:   Allergies   Allergen Reactions   • Peanut-Containing Drug Products Hives, Shortness Of Breath and Swelling       Objective     Physical Exam:   Vital Signs:   Vitals:    11/14/22 1418   Weight: 55.3 kg (122 lb)   Height: 147.3 cm (57.99\")   PainSc: 0-No pain     Body mass index is 25.51 kg/m².     Physical Exam  Vitals and nursing note reviewed.   Constitutional:       Appearance: Normal appearance.   HENT:      Head: Normocephalic and atraumatic.   Cardiovascular:      Comments: Well perfused  Pulmonary:      Effort: Pulmonary effort is normal.   Abdominal:      General: Abdomen is flat. There is no distension.      Palpations: Abdomen is soft.      Tenderness: There is no abdominal tenderness. There is no guarding.      Comments: Laparoscopic incisions clean dry and intact.  No " erythema or drainage.  Soft, nontender to palpation.   Musculoskeletal:         General: Normal range of motion.   Skin:     General: Skin is warm and dry.   Neurological:      General: No focal deficit present.      Mental Status: She is alert and oriented to person, place, and time. Mental status is at baseline.   Psychiatric:         Mood and Affect: Mood normal.         Behavior: Behavior normal.         Thought Content: Thought content normal.         Judgment: Judgment normal.         Labs:   Brief Urine Lab Results  (Last result in the past 365 days)      Color   Clarity   Blood   Leuk Est   Nitrite   Protein   CREAT   Urine HCG        04/26/22 1417 Yellow   Clear   3+   Small (1+)   Negative   Negative                 Urine Culture    Urine Culture 8/30/22   Urine Culture No growth              Lab Results   Component Value Date    GLUCOSE 90 11/08/2022    CALCIUM 8.8 11/08/2022     (L) 11/08/2022    K 3.8 11/08/2022    CO2 24.0 11/08/2022     11/08/2022    BUN 9 11/08/2022    CREATININE 0.61 11/08/2022    EGFRIFAFRI 106 03/24/2021    EGFRIFNONA 87 03/24/2021    BCR 14.8 11/08/2022    ANIONGAP 8.0 11/08/2022       Lab Results   Component Value Date    WBC 6.38 11/08/2022    HGB 10.9 (L) 11/08/2022    HCT 32.3 (L) 11/08/2022    MCV 95.8 11/08/2022     11/08/2022       Images:   XR Chest 2 View    Result Date: 8/29/2022  Interval TAVR. No acute cardiopulmonary findings.  This report was finalized on 8/29/2022 5:53 PM by Colin Bell MD.      MRI abdomen w wo contrast mrcp    Result Date: 7/19/2022  1. 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 Largest cystic lesion measures 1.6 cm. Recommend MRCP follow-up in one year to document stability. No main duct dilatation or solid pancreatic mass. 2. Moderate right hydronephrosis with renal pelvic dilatation which may relate to chronic UPJ stenosis,  improved from CT on 03/25/2022. Nonobstructing right lower pole renal calculi again noted. 3. Additional chronic findings above.  This report was finalized on 7/19/2022 8:52 AM by Eyad Walter MD.      Chest X-Ray PA & Lateral    Result Date: 7/26/2022  No active disease  This report was finalized on 7/26/2022 10:28 AM by Marty Bergman MD.      XR Pyelogram Retrograde    Result Date: 9/16/2022  1. Intraoperative fluoroscopy during right-sided retrograde evaluation with stent placement. Please see operative report for full findings and details.  This report was finalized on 9/16/2022 1:54 PM by Marcus Yusuf MD.        Measures:   Tobacco:   Greta Marino  reports that she has never smoked. She has never used smokeless tobacco.. I have educated her on the risk of diseases from using tobacco products.           Urine Incontinence: ( NOUI)  Patient reports that she is not currently experiencing any symptoms of urinary incontinence.    Assessment / Plan      Assessment/Plan:   67 y.o. female is seen today for 1 week postoperative follow-up after robotic pyeloplasty/pyelolithotomy.  She is doing very well in the postoperative setting.  She is tolerating diet, has return of bowel function, voiding without difficulties.  Reports minimal pain.  Physical examination today unremarkable.  Denies significant discomfort associated with stent.  Stent is indwelling.  We have discussed follow-up in 4 to 5 weeks for office-based cystoscopy and stent removal, followed by interval imaging for further evaluation in the postprocedural setting.  She is understanding agreeable.  She will alert with any changes..     Diagnoses and all orders for this visit:    1. Nephrolithiasis (Primary)    2. Ureteropelvic junction (UPJ) obstruction, right         Follow Up:   Return in about 4 weeks (around 12/12/2022) for Follow up for Cystoscopy, Recheck.     I spent approximately 20 minutes providing clinical care for this patient; including  review of patient's chart and provider documentation, face to face time spent with patient in examination room (obtaining history, performing physical exam, discussing diagnosis and management options), placing orders, and completing patient documentation.     Shahid Lopez MD  [facility] [place]

## 2022-11-15 ENCOUNTER — OFFICE VISIT (OUTPATIENT)
Dept: INTERNAL MEDICINE | Facility: CLINIC | Age: 67
End: 2022-11-15

## 2022-11-15 VITALS
RESPIRATION RATE: 16 BRPM | WEIGHT: 128 LBS | BODY MASS INDEX: 26.87 KG/M2 | HEART RATE: 80 BPM | HEIGHT: 58 IN | OXYGEN SATURATION: 98 % | DIASTOLIC BLOOD PRESSURE: 76 MMHG | TEMPERATURE: 97.5 F | SYSTOLIC BLOOD PRESSURE: 142 MMHG

## 2022-11-15 DIAGNOSIS — I10 ESSENTIAL HYPERTENSION: Primary | ICD-10-CM

## 2022-11-15 DIAGNOSIS — N20.0 NEPHROLITHIASIS: ICD-10-CM

## 2022-11-15 PROCEDURE — 99495 TRANSJ CARE MGMT MOD F2F 14D: CPT | Performed by: NURSE PRACTITIONER

## 2022-11-15 PROCEDURE — 1111F DSCHRG MED/CURRENT MED MERGE: CPT | Performed by: NURSE PRACTITIONER

## 2022-11-16 ENCOUNTER — APPOINTMENT (OUTPATIENT)
Dept: CARDIAC REHAB | Facility: HOSPITAL | Age: 67
End: 2022-11-16

## 2022-11-18 ENCOUNTER — APPOINTMENT (OUTPATIENT)
Dept: CARDIAC REHAB | Facility: HOSPITAL | Age: 67
End: 2022-11-18

## 2022-11-21 ENCOUNTER — APPOINTMENT (OUTPATIENT)
Dept: CARDIAC REHAB | Facility: HOSPITAL | Age: 67
End: 2022-11-21

## 2022-11-23 ENCOUNTER — APPOINTMENT (OUTPATIENT)
Dept: CARDIAC REHAB | Facility: HOSPITAL | Age: 67
End: 2022-11-23

## 2022-11-25 ENCOUNTER — APPOINTMENT (OUTPATIENT)
Dept: CARDIAC REHAB | Facility: HOSPITAL | Age: 67
End: 2022-11-25

## 2022-11-28 ENCOUNTER — APPOINTMENT (OUTPATIENT)
Dept: CARDIAC REHAB | Facility: HOSPITAL | Age: 67
End: 2022-11-28

## 2022-12-02 ENCOUNTER — TELEPHONE (OUTPATIENT)
Dept: UROLOGY | Facility: CLINIC | Age: 67
End: 2022-12-02

## 2022-12-02 NOTE — TELEPHONE ENCOUNTER
Patient needs a paper to go back to work, I can write this for her, we just need to know if she had weight restrictions or any restrictions in general. Please advise

## 2022-12-05 ENCOUNTER — OFFICE VISIT (OUTPATIENT)
Dept: INTERNAL MEDICINE | Facility: CLINIC | Age: 67
End: 2022-12-05

## 2022-12-05 ENCOUNTER — LAB (OUTPATIENT)
Dept: LAB | Facility: HOSPITAL | Age: 67
End: 2022-12-05

## 2022-12-05 VITALS
RESPIRATION RATE: 16 BRPM | TEMPERATURE: 97.1 F | HEIGHT: 58 IN | DIASTOLIC BLOOD PRESSURE: 80 MMHG | HEART RATE: 83 BPM | WEIGHT: 129.6 LBS | BODY MASS INDEX: 27.21 KG/M2 | OXYGEN SATURATION: 98 % | SYSTOLIC BLOOD PRESSURE: 138 MMHG

## 2022-12-05 DIAGNOSIS — E55.9 VITAMIN D DEFICIENCY: ICD-10-CM

## 2022-12-05 DIAGNOSIS — I35.9 AORTIC VALVE DISORDER: ICD-10-CM

## 2022-12-05 DIAGNOSIS — I10 ESSENTIAL HYPERTENSION: Primary | ICD-10-CM

## 2022-12-05 DIAGNOSIS — E78.2 MIXED HYPERLIPIDEMIA: ICD-10-CM

## 2022-12-05 DIAGNOSIS — I10 ESSENTIAL HYPERTENSION: ICD-10-CM

## 2022-12-05 DIAGNOSIS — Z78.0 POST-MENOPAUSAL: ICD-10-CM

## 2022-12-05 LAB
25(OH)D3 SERPL-MCNC: 37.3 NG/ML (ref 30–100)
ALBUMIN SERPL-MCNC: 4.5 G/DL (ref 3.5–5.2)
ALBUMIN/GLOB SERPL: 1.8 G/DL
ALP SERPL-CCNC: 92 U/L (ref 39–117)
ALT SERPL W P-5'-P-CCNC: 15 U/L (ref 1–33)
ANION GAP SERPL CALCULATED.3IONS-SCNC: 7.7 MMOL/L (ref 5–15)
AST SERPL-CCNC: 21 U/L (ref 1–32)
BASOPHILS # BLD AUTO: 0.04 10*3/MM3 (ref 0–0.2)
BASOPHILS NFR BLD AUTO: 0.9 % (ref 0–1.5)
BILIRUB SERPL-MCNC: 0.3 MG/DL (ref 0–1.2)
BUN SERPL-MCNC: 22 MG/DL (ref 8–23)
BUN/CREAT SERPL: 27.5 (ref 7–25)
CALCIUM SPEC-SCNC: 9.8 MG/DL (ref 8.6–10.5)
CHLORIDE SERPL-SCNC: 103 MMOL/L (ref 98–107)
CHOLEST SERPL-MCNC: 180 MG/DL (ref 0–200)
CO2 SERPL-SCNC: 25.3 MMOL/L (ref 22–29)
CREAT SERPL-MCNC: 0.8 MG/DL (ref 0.57–1)
DEPRECATED RDW RBC AUTO: 44.8 FL (ref 37–54)
EGFRCR SERPLBLD CKD-EPI 2021: 81.4 ML/MIN/1.73
EOSINOPHIL # BLD AUTO: 0.24 10*3/MM3 (ref 0–0.4)
EOSINOPHIL NFR BLD AUTO: 5.2 % (ref 0.3–6.2)
ERYTHROCYTE [DISTWIDTH] IN BLOOD BY AUTOMATED COUNT: 12.8 % (ref 12.3–15.4)
GLOBULIN UR ELPH-MCNC: 2.5 GM/DL
GLUCOSE SERPL-MCNC: 94 MG/DL (ref 65–99)
HCT VFR BLD AUTO: 38.4 % (ref 34–46.6)
HDLC SERPL-MCNC: 43 MG/DL (ref 40–60)
HGB BLD-MCNC: 12.5 G/DL (ref 12–15.9)
IMM GRANULOCYTES # BLD AUTO: 0.02 10*3/MM3 (ref 0–0.05)
IMM GRANULOCYTES NFR BLD AUTO: 0.4 % (ref 0–0.5)
LDLC SERPL CALC-MCNC: 119 MG/DL (ref 0–100)
LDLC/HDLC SERPL: 2.72 {RATIO}
LYMPHOCYTES # BLD AUTO: 1.36 10*3/MM3 (ref 0.7–3.1)
LYMPHOCYTES NFR BLD AUTO: 29.3 % (ref 19.6–45.3)
MCH RBC QN AUTO: 30.9 PG (ref 26.6–33)
MCHC RBC AUTO-ENTMCNC: 32.6 G/DL (ref 31.5–35.7)
MCV RBC AUTO: 94.8 FL (ref 79–97)
MONOCYTES # BLD AUTO: 0.32 10*3/MM3 (ref 0.1–0.9)
MONOCYTES NFR BLD AUTO: 6.9 % (ref 5–12)
NEUTROPHILS NFR BLD AUTO: 2.66 10*3/MM3 (ref 1.7–7)
NEUTROPHILS NFR BLD AUTO: 57.3 % (ref 42.7–76)
NRBC BLD AUTO-RTO: 0 /100 WBC (ref 0–0.2)
PLATELET # BLD AUTO: 175 10*3/MM3 (ref 140–450)
PMV BLD AUTO: 9.8 FL (ref 6–12)
POTASSIUM SERPL-SCNC: 5.1 MMOL/L (ref 3.5–5.2)
PROT SERPL-MCNC: 7 G/DL (ref 6–8.5)
RBC # BLD AUTO: 4.05 10*6/MM3 (ref 3.77–5.28)
SODIUM SERPL-SCNC: 136 MMOL/L (ref 136–145)
TRIGL SERPL-MCNC: 100 MG/DL (ref 0–150)
TSH SERPL DL<=0.05 MIU/L-ACNC: 0.89 UIU/ML (ref 0.27–4.2)
VIT B12 BLD-MCNC: 564 PG/ML (ref 211–946)
VLDLC SERPL-MCNC: 18 MG/DL (ref 5–40)
WBC NRBC COR # BLD: 4.64 10*3/MM3 (ref 3.4–10.8)

## 2022-12-05 PROCEDURE — 82306 VITAMIN D 25 HYDROXY: CPT

## 2022-12-05 PROCEDURE — 82607 VITAMIN B-12: CPT

## 2022-12-05 PROCEDURE — 85025 COMPLETE CBC W/AUTO DIFF WBC: CPT

## 2022-12-05 PROCEDURE — 84443 ASSAY THYROID STIM HORMONE: CPT

## 2022-12-05 PROCEDURE — 80053 COMPREHEN METABOLIC PANEL: CPT

## 2022-12-05 PROCEDURE — 99214 OFFICE O/P EST MOD 30 MIN: CPT | Performed by: NURSE PRACTITIONER

## 2022-12-05 PROCEDURE — 80061 LIPID PANEL: CPT

## 2022-12-05 NOTE — PROGRESS NOTES
Subjective   Greta Marino is a 67 y.o. female    Chief Complaint   Patient presents with   • Hypertension     6 month f/u    • Hyperlipidemia     F/u FASTING TODAY, GIVEN LIST WHERE SHE CAN GET LABS DONE TODAY     History of Present Illness     Here for f/u on chronic conditions    HTN - has been consistently taking BP meds (nifedipine and benazepril) as directed.  BP looks great today.  Denies any medication SE's     HL - chronic; currently taking Simvastatin 40 mg daily.  Denies med SE's.   Lab Results   Component Value Date    CHOL 126 06/30/2022    CHLPL 123 03/24/2021    TRIG 80 06/30/2022    HDL 40 06/30/2022    LDL 70 06/30/2022     TAVR on 7/28/22 - procedure went well and DUNCAN has resolved    S/P Pyelolithotomy and Pyeloplasty on 11/7/2022 per Dr. Lopez due to renal calculi and obstruction.  Has f/u on 12/22/22    Pancreatic cyst - followed by GI    COVID -3/31/2021, 4/23/2021, and 1/30/2022, 10/29/22  Tdap - 2015  Flu shot -10/29/22  Pneumovax 23 - 4/4/22  Mamm - 6/2/22  Colon- 8/2020, due in 2023      The following portions of the patient's history were reviewed and updated as appropriate: allergies, current medications, past family history, past medical history, past social history, past surgical history and problem list.    Current Outpatient Medications:   •  aspirin 81 MG EC tablet, Take 1 tablet by mouth Daily., Disp: 100 tablet, Rfl: 3  •  benazepril (LOTENSIN) 40 MG tablet, TAKE ONE TABLET BY MOUTH DAILY, Disp: 90 tablet, Rfl: 1  •  Cholecalciferol (Vitamin D3) 10 MCG (400 UNIT) capsule, Take 4,000 Units by mouth Daily., Disp: , Rfl:   •  NIFEdipine CC (ADALAT CC) 60 MG 24 hr tablet, Take 1 tablet by mouth Daily., Disp: 90 tablet, Rfl: 3  •  simvastatin (ZOCOR) 40 MG tablet, TAKE ONE TABLET BY MOUTH ONCE NIGHTLY, Disp: 90 tablet, Rfl: 1  •  vitamin B-12 (CYANOCOBALAMIN) 1000 MCG tablet, Take 1,000 mcg by mouth Daily., Disp: , Rfl:      Review of Systems   Constitutional: Negative for chills,  "fatigue and fever.   Respiratory: Negative for cough, chest tightness and shortness of breath.    Cardiovascular: Negative for chest pain.   Gastrointestinal: Negative for abdominal pain, diarrhea, nausea and vomiting.   Endocrine: Negative for cold intolerance and heat intolerance.   Musculoskeletal: Negative for arthralgias.   Neurological: Negative for dizziness.       Objective   Physical Exam  Constitutional:       Appearance: She is well-developed.   HENT:      Head: Normocephalic and atraumatic.   Eyes:      Conjunctiva/sclera: Conjunctivae normal.      Pupils: Pupils are equal, round, and reactive to light.   Cardiovascular:      Rate and Rhythm: Normal rate and regular rhythm.      Heart sounds: Murmur heard.    Systolic murmur is present with a grade of 3/6.  Pulmonary:      Effort: Pulmonary effort is normal.      Breath sounds: Normal breath sounds.   Abdominal:      General: Bowel sounds are normal.      Palpations: Abdomen is soft.   Musculoskeletal:         General: Normal range of motion.      Cervical back: Normal range of motion.   Skin:     General: Skin is warm and dry.   Neurological:      Mental Status: She is alert and oriented to person, place, and time.      Deep Tendon Reflexes: Reflexes are normal and symmetric.   Psychiatric:         Behavior: Behavior normal.         Thought Content: Thought content normal.         Judgment: Judgment normal.       Vitals:    12/05/22 0917   BP: 138/80   Cuff Size: Adult   Pulse: 83   Resp: 16   Temp: 97.1 °F (36.2 °C)   TempSrc: Infrared   SpO2: 98%   Weight: 58.8 kg (129 lb 9.6 oz)   Height: 147.3 cm (58\")         Assessment & Plan   Diagnoses and all orders for this visit:    1. Essential hypertension (Primary)  -     CBC & Differential; Future  -     Comprehensive Metabolic Panel; Future  -     Lipid Panel; Future  -     TSH; Future  -     Vitamin B12; Future  -     Vitamin D,25-Hydroxy; Future    2. Mixed hyperlipidemia  -     CBC & Differential; " Future  -     Comprehensive Metabolic Panel; Future  -     Lipid Panel; Future  -     TSH; Future  -     Vitamin B12; Future  -     Vitamin D,25-Hydroxy; Future    3. Aortic valve disorder  -     CBC & Differential; Future  -     Comprehensive Metabolic Panel; Future  -     Lipid Panel; Future  -     TSH; Future  -     Vitamin B12; Future  -     Vitamin D,25-Hydroxy; Future    4. Vitamin D deficiency  -     CBC & Differential; Future  -     Comprehensive Metabolic Panel; Future  -     Lipid Panel; Future  -     TSH; Future  -     Vitamin B12; Future  -     Vitamin D,25-Hydroxy; Future    5. Post-menopausal  -     DEXA Bone Density Axial; Future    No refills needed today  No medication changes made today  Patient will return to the clinic for fasting labs  Bone density scan ordered  Keep close follow-up with specialist as scheduled  Return in about 6 months (around 6/5/2023) for Annual.

## 2022-12-22 ENCOUNTER — PROCEDURE VISIT (OUTPATIENT)
Dept: UROLOGY | Facility: CLINIC | Age: 67
End: 2022-12-22

## 2022-12-22 VITALS — WEIGHT: 129 LBS | BODY MASS INDEX: 27.08 KG/M2 | HEIGHT: 58 IN

## 2022-12-22 DIAGNOSIS — N13.30 HYDRONEPHROSIS, UNSPECIFIED HYDRONEPHROSIS TYPE: Primary | ICD-10-CM

## 2022-12-22 PROCEDURE — 52310 CYSTOSCOPY AND TREATMENT: CPT | Performed by: UROLOGY

## 2022-12-22 NOTE — PROGRESS NOTES
Procedure: Flexible Cystoscopy with Stent Removal     Preoperative Diagnosis: Right ureteropelvic junction obstruction    Postoperative Diagnosis: Right ureteropelvic junction obstruction    Procedure performed: Cystoscopy with right ureteral stent removal     Surgeon: Shahid Lopez MD    Anesthesia: 2% Lidocaine Jelly    Indications: Greta Marino is a 67 y.o. year old female with a history of right ureteral pelvic junction obstruction who underwent robotic pyeloplasty.. A ureteral stent was left in place. The patient returns for planned ureteral stent removal today.     Procedure: Patient was taken to the urology procedure suite and prepped and draped in sterile fashion. A pre-procedural identification was performed. 10 cc of 2% lidocaine jelly was injected into the urethra. After adequate anesthesia, a lubricated flexible cystoscope was inserted into the urethra. The urethra appeared normal. The urinary sphincter was intact. The bladder was entered and 360 degree panendoscopy was performed revealing normal bladder anatomy, bilateral orthotopic ureteral orifices, and no concern for bladder stones, trabeculations, mucosal lesions/tumors, or divetrticuli. The right ureteral stent was in good position emanating from the ureteral orifice.      The right ureteral stent was grasped with a pair of grasping forceps and was removed without difficulty. The stent was removed intact.     PLAN    1) Discharge home    2) Follow up: 4 weeks with antibiotics

## 2023-01-09 ENCOUNTER — TELEPHONE (OUTPATIENT)
Dept: INTERNAL MEDICINE | Facility: CLINIC | Age: 68
End: 2023-01-09
Payer: MEDICARE

## 2023-01-09 NOTE — TELEPHONE ENCOUNTER
----- Message from LIBBY López sent at 1/6/2023 12:37 PM EST -----  Cholesterol has increased since last check.  Is she still taking her simvastatin?

## 2023-01-09 NOTE — TELEPHONE ENCOUNTER
Called and spoke with patient, she is taking the Simvastatin daily at night. Confirmed with her Kendrick Chow if a new medication is needed.     She verbalized understanding and had no further questions.

## 2023-01-10 ENCOUNTER — HOSPITAL ENCOUNTER (OUTPATIENT)
Dept: ULTRASOUND IMAGING | Facility: HOSPITAL | Age: 68
Discharge: HOME | End: 2023-01-10
Admitting: UROLOGY
Payer: MEDICARE

## 2023-01-10 DIAGNOSIS — N13.30 HYDRONEPHROSIS, UNSPECIFIED HYDRONEPHROSIS TYPE: ICD-10-CM

## 2023-01-10 PROCEDURE — 76775 US EXAM ABDO BACK WALL LIM: CPT

## 2023-01-10 RX ORDER — ROSUVASTATIN CALCIUM 40 MG/1
40 TABLET, COATED ORAL DAILY
Qty: 90 TABLET | Refills: 1 | Status: SHIPPED | OUTPATIENT
Start: 2023-01-10

## 2023-01-10 NOTE — TELEPHONE ENCOUNTER
Called and spoke with patient, informed her of medication change. She verbalized understanding and had no further questions.

## 2023-01-19 ENCOUNTER — OFFICE VISIT (OUTPATIENT)
Dept: UROLOGY | Facility: CLINIC | Age: 68
End: 2023-01-19
Payer: MEDICARE

## 2023-01-19 VITALS — WEIGHT: 129 LBS | HEIGHT: 58 IN | BODY MASS INDEX: 27.08 KG/M2

## 2023-01-19 DIAGNOSIS — N13.5 URETEROPELVIC JUNCTION (UPJ) OBSTRUCTION: Primary | ICD-10-CM

## 2023-01-19 PROCEDURE — 99024 POSTOP FOLLOW-UP VISIT: CPT | Performed by: UROLOGY

## 2023-01-19 NOTE — PROGRESS NOTES
Follow Up Office Visit      Patient Name: Greta Marino  : 1955   MRN: [id number]     Chief Complaint:    Chief Complaint   Patient presents with   • UPJO, Nephrolithiasis     History of Present Illness: Greta Marino is a 67 y.o. female who presents today for 4-week follow-up after stent removal with ultrasound.  Patient history includes undergoing robotic pyeloplasty in 2022 for right UPJ obstruction.    She is doing very well in the postoperative setting.  She denies current flank pain, lower urinary tract symptoms.  Denies dysuria or hematuria.  Denies nausea or emesis.    Subjective      Review of System: Review of Systems   Genitourinary: Negative for decreased urine volume, difficulty urinating, dysuria, enuresis, flank pain, frequency, hematuria and urgency.      I have reviewed the ROS documented by my clinical staff, updated as appropriate and I agree. Shahid Lopez MD    I have reviewed and the following portions of the patient's history were updated as appropriate: past family history, past medical history, past social history, past surgical history and problem list.    Medications:     Current Outpatient Medications:   •  aspirin 81 MG EC tablet, Take 1 tablet by mouth Daily., Disp: 100 tablet, Rfl: 3  •  benazepril (LOTENSIN) 40 MG tablet, TAKE ONE TABLET BY MOUTH DAILY, Disp: 90 tablet, Rfl: 1  •  Cholecalciferol (Vitamin D3) 10 MCG (400 UNIT) capsule, Take 4,000 Units by mouth Daily., Disp: , Rfl:   •  NIFEdipine CC (ADALAT CC) 60 MG 24 hr tablet, Take 1 tablet by mouth Daily., Disp: 90 tablet, Rfl: 3  •  rosuvastatin (Crestor) 40 MG tablet, Take 1 tablet by mouth Daily., Disp: 90 tablet, Rfl: 1  •  vitamin B-12 (CYANOCOBALAMIN) 1000 MCG tablet, Take 1,000 mcg by mouth Daily., Disp: , Rfl:     Allergies:   Allergies   Allergen Reactions   • Peanut-Containing Drug Products Hives, Shortness Of Breath and Swelling       Objective     Physical Exam:   Vital Signs:   Vitals:     "01/19/23 0945   Weight: 58.5 kg (129 lb)   Height: 147.3 cm (57.99\")   PainSc: 0-No pain     Body mass index is 26.97 kg/m².     Physical Exam  Vitals and nursing note reviewed.   Constitutional:       Appearance: Normal appearance.   HENT:      Head: Normocephalic and atraumatic.   Cardiovascular:      Comments: Well perfused  Pulmonary:      Effort: Pulmonary effort is normal.   Abdominal:      General: Abdomen is flat.      Palpations: Abdomen is soft.   Musculoskeletal:         General: Normal range of motion.   Skin:     General: Skin is warm and dry.   Neurological:      General: No focal deficit present.      Mental Status: She is alert and oriented to person, place, and time. Mental status is at baseline.   Psychiatric:         Mood and Affect: Mood normal.         Behavior: Behavior normal.         Thought Content: Thought content normal.         Judgment: Judgment normal.         Labs:   Brief Urine Lab Results  (Last result in the past 365 days)      Color   Clarity   Blood   Leuk Est   Nitrite   Protein   CREAT   Urine HCG        04/26/22 1417 Yellow   Clear   3+   Small (1+)   Negative   Negative                 Urine Culture    Urine Culture 8/30/22   Urine Culture No growth              Lab Results   Component Value Date    GLUCOSE 94 12/05/2022    CALCIUM 9.8 12/05/2022     12/05/2022    K 5.1 12/05/2022    CO2 25.3 12/05/2022     12/05/2022    BUN 22 12/05/2022    CREATININE 0.80 12/05/2022    EGFRIFAFRI 106 03/24/2021    EGFRIFNONA 87 03/24/2021    BCR 27.5 (H) 12/05/2022    ANIONGAP 7.7 12/05/2022       Lab Results   Component Value Date    WBC 4.64 12/05/2022    HGB 12.5 12/05/2022    HCT 38.4 12/05/2022    MCV 94.8 12/05/2022     12/05/2022       Images:   US Renal Bilateral    Result Date: 1/12/2023  Impression: 1. No hydronephrosis. 2. Small simple right kidney cysts. Electronically Signed: Waqas Poole  1/12/2023 3:33 AM EST  Workstation ID: [id number]      Measures: "   Tobacco:   Greta Marino  reports that she has never smoked. She has never used smokeless tobacco.. I have educated her on the risk of diseases from using tobacco products.  Assessment / Plan      Assessment/Plan:   67 y.o. female is seen today for 4-week follow-up after ureteral stent removal with ultrasound.  Ultrasound demonstrates no hydronephrosis.  History of ureteropelvic junction obstruction with robotic pyeloplasty in 11/2022.  She is doing very well from a symptomatic standpoint, denies any recurrence of flank pain.  She denies nausea, emesis.  Denies lower urinary tract symptoms.  Today we have reviewed ultrasound results, demonstrating complete resolution of hydronephrosis.  We will have her follow-up in 6 months with repeat ultrasound.  If there is any recurrence of hydronephrosis we will then obtain nuclear medicine imaging.  She will alert with any change in symptoms and we are happy to see her at any time.    Diagnoses and all orders for this visit:    1. Ureteropelvic junction (UPJ) obstruction (Primary)         Follow Up:   Return in about 6 months (around 7/19/2023) for Recheck.     I spent approximately 20 minutes providing clinical care for this patient; including review of patient's chart and provider documentation, face to face time spent with patient in examination room (obtaining history, performing physical exam, discussing diagnosis and management options), placing orders, and completing patient documentation.     Shahid Lopez MD  [facility]

## 2023-03-27 ENCOUNTER — OFFICE VISIT (OUTPATIENT)
Dept: CARDIOLOGY | Facility: CLINIC | Age: 68
End: 2023-03-27
Payer: MEDICARE

## 2023-03-27 VITALS
HEART RATE: 97 BPM | SYSTOLIC BLOOD PRESSURE: 118 MMHG | HEIGHT: 58 IN | DIASTOLIC BLOOD PRESSURE: 64 MMHG | BODY MASS INDEX: 26.45 KG/M2 | OXYGEN SATURATION: 98 % | WEIGHT: 126 LBS

## 2023-03-27 DIAGNOSIS — I50.32 CHRONIC DIASTOLIC (CONGESTIVE) HEART FAILURE: ICD-10-CM

## 2023-03-27 DIAGNOSIS — I35.0 NONRHEUMATIC AORTIC VALVE STENOSIS: ICD-10-CM

## 2023-03-27 DIAGNOSIS — I10 ESSENTIAL HYPERTENSION: ICD-10-CM

## 2023-03-27 DIAGNOSIS — E78.2 MIXED HYPERLIPIDEMIA: ICD-10-CM

## 2023-03-27 DIAGNOSIS — I35.9 AORTIC VALVE DISORDER: Primary | ICD-10-CM

## 2023-03-27 PROCEDURE — 1160F RVW MEDS BY RX/DR IN RCRD: CPT | Performed by: INTERNAL MEDICINE

## 2023-03-27 PROCEDURE — 3074F SYST BP LT 130 MM HG: CPT | Performed by: INTERNAL MEDICINE

## 2023-03-27 PROCEDURE — 99213 OFFICE O/P EST LOW 20 MIN: CPT | Performed by: INTERNAL MEDICINE

## 2023-03-27 PROCEDURE — 1159F MED LIST DOCD IN RCRD: CPT | Performed by: INTERNAL MEDICINE

## 2023-03-27 PROCEDURE — 3078F DIAST BP <80 MM HG: CPT | Performed by: INTERNAL MEDICINE

## 2023-03-27 NOTE — PROGRESS NOTES
[facility]  Subjective:     Encounter Date: 03/27/2023      Patient ID: Greta Marino is a 67 y.o. female.    Chief Complaint: Nonrheumatic aortic valve stenosis      PROBLEM LIST:  1. Aortic stenosis   a. Echo, 05/11/2016: EF 70%. LV has mild concentric hypertrophy. AV was trileaflet. Moderate stenosis mean 20-25 mmHg, max 45-50 mmHg. Moderate AR, MR, and TR. LA mildly dilated.   b. Echo, 08/17/2020: LVEF 66%. Moderate AI and severe AS with mean gradient 42 mmHg.  Mild MR and TR.  RVSP 23  mmHg. Grade 1 diastolic dysfunction\  c. Echo, 04/26/2021: EF 60%. Severe aortic valve stenosis is present. Aortic valve area is 0.8 cm2. AV max pressure gradient 76 mmHg, mean 39 mmHg. Mild to moderate AR. Mild MR.  d. LHC, 06/30/2022 LHC 40% RCA with normal IFR.  Otherwise normal coronaries.  e. SOURAV, 06/30/2022, Dr. Camara EF 60%.  Bicuspid aortic valve with severe aortic valve stenosis.  f. TAVR, 07/28/2022: 23 mm Nicolas PALMER III tissue valve  g. Echo, 08/23/2022: EF 60%.  TAVR valve present.  Mild perivalvular regurgitation.  Mild to moderate MR.  2. Hypertension  3. Hyperlipidemia.   4. Arthritis  5. Asthma  6. Nephrolithiasis   7. Surgeries:  a. Ankle surgery, left  b. Breast biopsy  c. Knee surgery, right  d. cytoscopy with stent removal, 2023       History of Present Illness  Greta Marino returns today for a 6 month follow up with a history of AV disorder and cardiac risk factors. Since last visit, the patient has been doing well overall from a cardiovascular standpoint. Patient maintains a stable activity level by walking 2 to 3 miles a week. She states she no longer has a heart flutter. She recently followed up with Dr. Lopez for hydronephrosis and a recent cytoscopy with stent removal. Patient denies chest pain, shortness of breath, orthopnea, palpitations, edema, dizziness, and syncope.      Allergies   Allergen Reactions   • Peanut-Containing Drug Products Hives,  "Shortness Of Breath and Swelling         Current Outpatient Medications:   •  aspirin 81 MG EC tablet, Take 1 tablet by mouth Daily., Disp: 100 tablet, Rfl: 3  •  benazepril (LOTENSIN) 40 MG tablet, TAKE ONE TABLET BY MOUTH DAILY, Disp: 90 tablet, Rfl: 1  •  Cholecalciferol (Vitamin D3) 10 MCG (400 UNIT) capsule, Take 4,000 Units by mouth Daily., Disp: , Rfl:   •  NIFEdipine CC (ADALAT CC) 60 MG 24 hr tablet, Take 1 tablet by mouth Daily., Disp: 90 tablet, Rfl: 3  •  rosuvastatin (Crestor) 40 MG tablet, Take 1 tablet by mouth Daily., Disp: 90 tablet, Rfl: 1  •  vitamin B-12 (CYANOCOBALAMIN) 1000 MCG tablet, Take 1 tablet by mouth Daily., Disp: , Rfl:     The following portions of the patient's history were reviewed and updated as appropriate: allergies, current medications, past family history, past medical history, past social history, past surgical history and problem list.    Review of Systems   Constitutional: Negative.   Cardiovascular: Negative for chest pain, dyspnea on exertion, leg swelling, palpitations and syncope.   Respiratory: Negative.  Negative for shortness of breath.    Hematologic/Lymphatic: Negative for bleeding problem. Does not bruise/bleed easily.   Skin: Negative for rash.   Musculoskeletal: Negative for muscle weakness and myalgias.   Gastrointestinal: Negative for heartburn, nausea and vomiting.   Neurological: Negative for dizziness, light-headedness, loss of balance and numbness.          Objective:     Vitals:    03/27/23 1414   BP: 118/64   BP Location: Left arm   Patient Position: Sitting   Pulse: 97   SpO2: 98%   Weight: 57.2 kg (126 lb)   Height: 147.3 cm (58\")         Constitutional:       Appearance: Well-developed.   Neck:      Thyroid: No thyromegaly.      Vascular: No carotid bruit or JVD.   Pulmonary:      Breath sounds: Normal breath sounds.   Cardiovascular:      Regular rhythm.      Murmurs: There is a grade 2/6 midsystolic murmur at the URSB.      No gallop. No S3 and S4 " gallop.   Edema:     Peripheral edema absent.   Abdominal:      General: Bowel sounds are normal.      Palpations: Abdomen is soft. There is no abdominal mass.      Tenderness: There is no abdominal tenderness.   Skin:     General: Skin is warm and dry.      Findings: No rash.   Neurological:      Mental Status: Alert and oriented to person, place, and time.         Lab Review:  Lab Results   Component Value Date    GLUCOSE 94 12/05/2022    BUN 22 12/05/2022    CREATININE 0.80 12/05/2022    EGFR 81.4 12/05/2022    BCR 27.5 (H) 12/05/2022     12/05/2022    K 5.1 12/05/2022    CO2 25.3 12/05/2022    CALCIUM 9.8 12/05/2022    ALBUMIN 4.50 12/05/2022    ALKPHOS 92 12/05/2022    AST 21 12/05/2022    ALT 15 12/05/2022     Lab Results   Component Value Date    CHOL 180 12/05/2022    TRIG 100 12/05/2022    HDL 43 12/05/2022     (H) 12/05/2022      Lab Results   Component Value Date    WBC 4.64 12/05/2022    RBC 4.05 12/05/2022    HGB 12.5 12/05/2022    HCT 38.4 12/05/2022    MCV 94.8 12/05/2022     12/05/2022     Lab Results   Component Value Date    TSH 0.894 12/05/2022     Lab Results   Component Value Date    HGBA1C 4.80 11/04/2022        Advance Care Planning   ACP discussion was held with the patient during this visit. Patient does not have an advance directive, information provided.        Procedures        Assessment:   Diagnoses and all orders for this visit:    1. Aortic valve disorder (Primary)    2. Essential hypertension    3. Mixed hyperlipidemia        Impression:  1. Aortic valve disorder.  1 year status post TAVR normal functioning by echo.  Stable and asymptomatic.    2. Essential hypertension. Well controlled.   3. Mixed hyperlipidemia. Well controlled.  On high intensity statin  4.  Nonflow limiting CAD by cath 6/22    Plan:  1. Stable cardiac status.   2. Echocardiogram to be completed prior to next follow up on TAVR.    3. Continue current medications.  4. Revisit in 12 MO, or sooner  as needed.      Scribed for Jaylan Redd MD by Patsy Penaloza. 3/27/2023 14:28 EDT         Jaylan Redd MD      Please note that portions of this note may have been completed with a voice recognition program. Efforts were made to edit the dictations, but occasionally words are mistranscribed.

## 2023-04-14 DIAGNOSIS — E78.2 MIXED HYPERLIPIDEMIA: ICD-10-CM

## 2023-04-14 DIAGNOSIS — I10 ESSENTIAL HYPERTENSION: ICD-10-CM

## 2023-04-17 RX ORDER — BENAZEPRIL HYDROCHLORIDE 40 MG/1
TABLET, FILM COATED ORAL
Qty: 30 TABLET | Refills: 1 | Status: SHIPPED | OUTPATIENT
Start: 2023-04-17

## 2023-04-17 RX ORDER — NIFEDIPINE 60 MG/1
TABLET, FILM COATED, EXTENDED RELEASE ORAL
Qty: 30 TABLET | Refills: 1 | Status: SHIPPED | OUTPATIENT
Start: 2023-04-17

## 2023-04-26 ENCOUNTER — TELEPHONE (OUTPATIENT)
Dept: INTERNAL MEDICINE | Facility: CLINIC | Age: 68
End: 2023-04-26
Payer: MEDICARE

## 2023-04-26 DIAGNOSIS — E78.2 MIXED HYPERLIPIDEMIA: ICD-10-CM

## 2023-04-26 DIAGNOSIS — I10 ESSENTIAL HYPERTENSION: ICD-10-CM

## 2023-04-26 RX ORDER — NIFEDIPINE 60 MG/1
60 TABLET, FILM COATED, EXTENDED RELEASE ORAL DAILY
Qty: 90 TABLET | Refills: 1 | Status: SHIPPED | OUTPATIENT
Start: 2023-04-26

## 2023-04-26 RX ORDER — BENAZEPRIL HYDROCHLORIDE 40 MG/1
40 TABLET, FILM COATED ORAL DAILY
Qty: 90 TABLET | Refills: 1 | Status: SHIPPED | OUTPATIENT
Start: 2023-04-26

## 2023-04-26 NOTE — TELEPHONE ENCOUNTER
Caller: Greta Marino    Relationship: Self    Best call back number: [phone]    What is the best time to reach you: ANYTIME     Who are you requesting to speak with (clinical staff, provider,  specific staff member): CLINICAL STAFF     What was the call regarding: PATIENT IS CALLING IN REGARDS TO HER LAST REFILL. SHE SAYS THAT SHE TYPICALLY GETS A 90 DAY SUPPLY AND THIS TIME SHE ONLY GOT 30. SHE IS WONDERING IF SHE IS NEEDING TO COME IN FOR ANY LABS OR AN APPOINTMENT     Do you require a callback: YES

## 2023-04-26 NOTE — TELEPHONE ENCOUNTER
Called and spoke with patient, after reviewing last office visit note from Alia, she is schedule appropriately for her 6 month recheck. She asked that a 90 day supply be sent to the pharmacy. Informed her that I would resubmit prescription with a 90 day supply. She verbalized understanding and had no further questions.

## 2023-08-01 ENCOUNTER — LAB (OUTPATIENT)
Dept: INTERNAL MEDICINE | Facility: CLINIC | Age: 68
End: 2023-08-01
Payer: MEDICARE

## 2023-08-01 ENCOUNTER — OFFICE VISIT (OUTPATIENT)
Dept: INTERNAL MEDICINE | Facility: CLINIC | Age: 68
End: 2023-08-01
Payer: MEDICARE

## 2023-08-01 VITALS
HEART RATE: 72 BPM | RESPIRATION RATE: 18 BRPM | HEIGHT: 58 IN | BODY MASS INDEX: 27.33 KG/M2 | OXYGEN SATURATION: 97 % | TEMPERATURE: 97.8 F | DIASTOLIC BLOOD PRESSURE: 70 MMHG | WEIGHT: 130.2 LBS | SYSTOLIC BLOOD PRESSURE: 142 MMHG

## 2023-08-01 DIAGNOSIS — M11.20 CHONDROCALCINOSIS: ICD-10-CM

## 2023-08-01 DIAGNOSIS — M11.20 CHONDROCALCINOSIS: Primary | ICD-10-CM

## 2023-08-01 LAB
ALBUMIN SERPL-MCNC: 4.3 G/DL (ref 3.5–5.2)
ALBUMIN/GLOB SERPL: 1.3 G/DL
ALP SERPL-CCNC: 89 U/L (ref 39–117)
ALT SERPL W P-5'-P-CCNC: 14 U/L (ref 1–33)
ANION GAP SERPL CALCULATED.3IONS-SCNC: 12.6 MMOL/L (ref 5–15)
AST SERPL-CCNC: 19 U/L (ref 1–32)
BASOPHILS # BLD AUTO: 0.04 10*3/MM3 (ref 0–0.2)
BASOPHILS NFR BLD AUTO: 0.5 % (ref 0–1.5)
BILIRUB SERPL-MCNC: 0.4 MG/DL (ref 0–1.2)
BUN SERPL-MCNC: 17 MG/DL (ref 8–23)
BUN/CREAT SERPL: 26.2 (ref 7–25)
CALCIUM SPEC-SCNC: 10.1 MG/DL (ref 8.6–10.5)
CHLORIDE SERPL-SCNC: 105 MMOL/L (ref 98–107)
CO2 SERPL-SCNC: 24.4 MMOL/L (ref 22–29)
CREAT SERPL-MCNC: 0.65 MG/DL (ref 0.57–1)
DEPRECATED RDW RBC AUTO: 42.8 FL (ref 37–54)
EGFRCR SERPLBLD CKD-EPI 2021: 96.6 ML/MIN/1.73
EOSINOPHIL # BLD AUTO: 0.09 10*3/MM3 (ref 0–0.4)
EOSINOPHIL NFR BLD AUTO: 1.1 % (ref 0.3–6.2)
ERYTHROCYTE [DISTWIDTH] IN BLOOD BY AUTOMATED COUNT: 12.6 % (ref 12.3–15.4)
GLOBULIN UR ELPH-MCNC: 3.2 GM/DL
GLUCOSE SERPL-MCNC: 100 MG/DL (ref 65–99)
HCT VFR BLD AUTO: 39.5 % (ref 34–46.6)
HGB BLD-MCNC: 13.4 G/DL (ref 12–15.9)
IMM GRANULOCYTES # BLD AUTO: 0.02 10*3/MM3 (ref 0–0.05)
IMM GRANULOCYTES NFR BLD AUTO: 0.3 % (ref 0–0.5)
LYMPHOCYTES # BLD AUTO: 1.2 10*3/MM3 (ref 0.7–3.1)
LYMPHOCYTES NFR BLD AUTO: 15.3 % (ref 19.6–45.3)
MAGNESIUM SERPL-MCNC: 2.2 MG/DL (ref 1.6–2.4)
MCH RBC QN AUTO: 31.8 PG (ref 26.6–33)
MCHC RBC AUTO-ENTMCNC: 33.9 G/DL (ref 31.5–35.7)
MCV RBC AUTO: 93.8 FL (ref 79–97)
MONOCYTES # BLD AUTO: 0.57 10*3/MM3 (ref 0.1–0.9)
MONOCYTES NFR BLD AUTO: 7.3 % (ref 5–12)
NEUTROPHILS NFR BLD AUTO: 5.92 10*3/MM3 (ref 1.7–7)
NEUTROPHILS NFR BLD AUTO: 75.5 % (ref 42.7–76)
NRBC BLD AUTO-RTO: 0 /100 WBC (ref 0–0.2)
PLATELET # BLD AUTO: 202 10*3/MM3 (ref 140–450)
PMV BLD AUTO: 10.2 FL (ref 6–12)
POTASSIUM SERPL-SCNC: 4.4 MMOL/L (ref 3.5–5.2)
PROT SERPL-MCNC: 7.5 G/DL (ref 6–8.5)
RBC # BLD AUTO: 4.21 10*6/MM3 (ref 3.77–5.28)
SODIUM SERPL-SCNC: 142 MMOL/L (ref 136–145)
WBC NRBC COR # BLD: 7.84 10*3/MM3 (ref 3.4–10.8)

## 2023-08-01 PROCEDURE — 3078F DIAST BP <80 MM HG: CPT | Performed by: NURSE PRACTITIONER

## 2023-08-01 PROCEDURE — 80053 COMPREHEN METABOLIC PANEL: CPT | Performed by: NURSE PRACTITIONER

## 2023-08-01 PROCEDURE — 99214 OFFICE O/P EST MOD 30 MIN: CPT | Performed by: NURSE PRACTITIONER

## 2023-08-01 PROCEDURE — 85025 COMPLETE CBC W/AUTO DIFF WBC: CPT | Performed by: NURSE PRACTITIONER

## 2023-08-01 PROCEDURE — 83735 ASSAY OF MAGNESIUM: CPT | Performed by: NURSE PRACTITIONER

## 2023-08-01 PROCEDURE — 1159F MED LIST DOCD IN RCRD: CPT | Performed by: NURSE PRACTITIONER

## 2023-08-01 PROCEDURE — 1160F RVW MEDS BY RX/DR IN RCRD: CPT | Performed by: NURSE PRACTITIONER

## 2023-08-01 PROCEDURE — 3077F SYST BP >= 140 MM HG: CPT | Performed by: NURSE PRACTITIONER

## 2023-08-01 PROCEDURE — 36415 COLL VENOUS BLD VENIPUNCTURE: CPT | Performed by: NURSE PRACTITIONER

## 2023-08-01 RX ORDER — PREDNISONE 10 MG/1
TABLET ORAL
Qty: 48 EACH | Refills: 0 | Status: SHIPPED | OUTPATIENT
Start: 2023-08-01

## 2023-08-03 ENCOUNTER — TELEPHONE (OUTPATIENT)
Dept: INTERNAL MEDICINE | Facility: CLINIC | Age: 68
End: 2023-08-03
Payer: MEDICARE

## 2023-08-04 ENCOUNTER — LAB (OUTPATIENT)
Dept: LAB | Facility: HOSPITAL | Age: 68
End: 2023-08-04
Payer: MEDICARE

## 2023-08-04 DIAGNOSIS — M25.40 JOINT SWELLING: ICD-10-CM

## 2023-08-04 DIAGNOSIS — M11.231 CHONDROCALCINOSIS OF RIGHT WRIST: ICD-10-CM

## 2023-08-04 DIAGNOSIS — M25.9 REDNESS OF JOINT: Primary | ICD-10-CM

## 2023-08-04 DIAGNOSIS — M25.9 REDNESS OF JOINT: ICD-10-CM

## 2023-08-04 LAB
CHROMATIN AB SERPL-ACNC: 10.1 IU/ML (ref 0–14)
CRP SERPL-MCNC: 4.14 MG/DL (ref 0–0.5)
ERYTHROCYTE [SEDIMENTATION RATE] IN BLOOD: 68 MM/HR (ref 0–30)
URATE SERPL-MCNC: 2 MG/DL (ref 2.4–5.7)

## 2023-08-04 PROCEDURE — 86038 ANTINUCLEAR ANTIBODIES: CPT

## 2023-08-04 PROCEDURE — 86140 C-REACTIVE PROTEIN: CPT

## 2023-08-04 PROCEDURE — 86431 RHEUMATOID FACTOR QUANT: CPT

## 2023-08-04 PROCEDURE — 85652 RBC SED RATE AUTOMATED: CPT

## 2023-08-04 PROCEDURE — 84550 ASSAY OF BLOOD/URIC ACID: CPT

## 2023-08-07 ENCOUNTER — TELEPHONE (OUTPATIENT)
Dept: INTERNAL MEDICINE | Facility: CLINIC | Age: 68
End: 2023-08-07
Payer: MEDICARE

## 2023-08-07 LAB — ANA SER QL: NEGATIVE

## 2023-08-07 NOTE — TELEPHONE ENCOUNTER
Caller: Greta Marino    Relationship: Self    Best call back number: [phone]     SHE WAS SEEN FOR AN APPOINTMENT ABOUT THE WRIST SWELLING AND WOULD LIKE TO KNOW WHEN SHE WILL BE SCHEDULED FOR PHYSICAL THERAPY AND HAS NOT HEARD FROM THE ORTHOPEDIC SURGEON.

## 2023-08-08 NOTE — TELEPHONE ENCOUNTER
PT has not been ordered.  It looks like Ortho has tried to call her to schedule earlier today.  Did she get their voicemail?

## 2023-08-10 ENCOUNTER — OFFICE VISIT (OUTPATIENT)
Dept: ORTHOPEDIC SURGERY | Facility: CLINIC | Age: 68
End: 2023-08-10
Payer: MEDICARE

## 2023-08-10 VITALS
SYSTOLIC BLOOD PRESSURE: 140 MMHG | WEIGHT: 127 LBS | HEIGHT: 58 IN | DIASTOLIC BLOOD PRESSURE: 80 MMHG | BODY MASS INDEX: 26.66 KG/M2

## 2023-08-10 DIAGNOSIS — M77.8 EXTENSOR CARPI ULNARIS TENDINITIS: Primary | ICD-10-CM

## 2023-08-10 DIAGNOSIS — M18.11 PRIMARY OSTEOARTHRITIS OF FIRST CARPOMETACARPAL JOINT OF RIGHT HAND: ICD-10-CM

## 2023-08-10 DIAGNOSIS — M19.231 OTHER SECONDARY OSTEOARTHRITIS OF RIGHT WRIST: ICD-10-CM

## 2023-08-10 DIAGNOSIS — M25.431 SWELLING OF RIGHT WRIST: ICD-10-CM

## 2023-08-10 RX ORDER — CELECOXIB 200 MG/1
CAPSULE ORAL
Qty: 60 CAPSULE | Refills: 1 | Status: SHIPPED | OUTPATIENT
Start: 2023-08-10

## 2023-08-10 RX ORDER — PREDNISONE 10 MG/1
TABLET ORAL
COMMUNITY
Start: 2023-08-01

## 2023-08-10 NOTE — PROGRESS NOTES
[facility] [place] [facility] Office Visit     Office Visit       Date: 08/10/2023   Patient Name: Greta Marino  MRN: [id number]  YOB: 1955    Referring Physician: Alia Felton APRN     Chief Complaint:   Chief Complaint   Patient presents with    Right Wrist - Pain       History of Present Illness:   Greta Marino is a 68 y.o. female who presents with new problem of: right wrist pain.  Onset: atraumatic and gradual in nature. The issue has been ongoing for 2 week(s). Pain is a 5/10 on the pain scale. Pain is described as aching and shooting. Associated symptoms include pain, swelling, and stiffness. The pain is worse with working, lying on affected side, and any movement of the joint; ice, pain medication and/or NSAID, lying down, and elevating the extremity improve the pain. Previous treatments have included: bracing, NSAIDS, and oral steroids.    Subjective   Review of Systems: Review of Systems   Constitutional:  Negative for chills, fever, unexpected weight gain and unexpected weight loss.   HENT:  Negative for congestion, postnasal drip and rhinorrhea.    Eyes:  Negative for blurred vision.   Respiratory:  Negative for shortness of breath.    Cardiovascular:  Negative for leg swelling.   Gastrointestinal:  Negative for abdominal pain, nausea and vomiting.   Genitourinary:  Negative for difficulty urinating.   Musculoskeletal:  Positive for arthralgias. Negative for gait problem, joint swelling and myalgias.   Skin:  Negative for skin lesions and wound.   Neurological:  Negative for dizziness, weakness, light-headedness and numbness.   Hematological:  Does not bruise/bleed easily.   Psychiatric/Behavioral:  Negative for depressed mood.       I have reviewed the following portions of the patient's history:History of Present Illness and review of systems.    Past Medical History:   Past Medical History:   Diagnosis Date    Arthritis     Asthma      Coronary artery disease     Heart murmur     Heart valve disease     aortic valve replaced - 2022    History of mammography, screening 2017    Done in [place]     History of Papanicolaou smear of cervix 2017    Done in [place]    History of shingles 2015    Hyperlipidemia     Hypertension     Kidney stones     Non-functioning kidney     RIGHT    Pancreatic cyst     Pneumonia 1985    Wears glasses        Past Surgical History:   Past Surgical History:   Procedure Laterality Date    ANKLE SURGERY Left 1980's    ORIF    AORTIC VALVE REPAIR/REPLACEMENT N/A 07/28/2022    Procedure: TRANSCATHETER AORTIC VALVE REPLACEMENT;  Surgeon: Jaylan Mckay MD;  Location:  [facility];  Service: Cardiothoracic;  Laterality: N/A;  flouro 150  dose 16ML  contrast 102 MgY    AORTIC VALVE REPAIR/REPLACEMENT N/A 07/28/2022    Procedure: Transfemoral Transcatheter Aortic Valve Replacement;  Surgeon: Dorian Camara MD;  Location:  [facility];  Service: Cardiovascular;  Laterality: N/A;    BREAST BIOPSY Right 1995    CARDIAC CATHETERIZATION Left 06/30/2022    Procedure: Left Heart Cath;  Surgeon: Jaylan Redd MD;  Location:  [facility];  Service: Cardiovascular;  Laterality: Left;  to be scheduled in next 1-2 months    COLONOSCOPY  8/24/2020, 2005    8/24/2020- Dr. Bowling. 2005-Dr. Spence in [place], [place]    CYSTOSCOPY  2022    CYSTOSCOPY W/ URETERAL STENT PLACEMENT Right 11/07/2022    Procedure: CYSTOSCOPY URETERAL CATHETER/STENT INSERTION;  Surgeon: Shahid Lopez MD;  Location:  [facility];  Service: Robotics - DaVinci;  Laterality: Right;    CYSTOSCOPY, URETEROSCOPY, RETROGRADE PYELOGRAM, STONE EXTRACTION, STENT INSERTION Right 09/16/2022    Procedure: URETEROSCOPY, RETROGRADE PYELOGRAM, STENT INSERTION - RIGHT;  Surgeon: Shahid Lopez MD;  Location:  [facility];  Service: Urology;  Laterality: Right;    KIDNEY STONE SURGERY  2022    KNEE SURGERY Right 12/20/2018    meniscus  repair    PYELOLITHOTOMY Right 11/07/2022    Procedure: PYELOLITHOTOMY LAPAROSCOPIC WITH DAVINCI ROBOT;  Surgeon: Shahid Lopez MD;  Location:  [facility];  Service: Robotics - DaVinci;  Laterality: Right;    PYELOPLASTY Right 11/07/2022    Procedure: PYELOPLASTY LAPAROSCOPIC WITH DAVINCI ROBOT;  Surgeon: Shahid Lopez MD;  Location:  [facility];  Service: Robotics - DaVinci;  Laterality: Right;    TRANSESOPHAGEAL ECHOCARDIOGRAM (SOURAV)      TRANSESOPHAGEAL ECHOCARDIOGRAM (SOURAV) N/A 07/28/2022    Procedure: TRANSESOPHAGEAL ECHOCARDIOGRAM;  Surgeon: Jaylan Mckay MD;  Location:  [facility];  Service: Cardiothoracic;  Laterality: N/A;       Family History:   Family History   Problem Relation Age of Onset    Pancreatic cancer Mother         Passed away in 2002    Arthritis Mother     Cancer Mother         Pancreatic cancer    Diabetes Father         Type 2    Heart attack Father         passed in 2000    Hypertension Father     Colon cancer Father     Cancer Father         Colon cancer    Sudden death Father     Diabetes Brother         Type 2    Hypertension Brother     Hypertension Brother     Stroke Brother     Sarcoidosis Daughter     Breast cancer Neg Hx     Ovarian cancer Neg Hx        Social History:   Social History     Socioeconomic History    Marital status:     Number of children: 2    Highest education level: High school graduate   Tobacco Use    Smoking status: Never     Passive exposure: Never    Smokeless tobacco: Never   Vaping Use    Vaping Use: Never used   Substance and Sexual Activity    Alcohol use: Yes     Comment: 1-2 per month    Drug use: Never    Sexual activity: Not Currently     Partners: Male     Birth control/protection: Spermicide, Birth control pill     Comment:        Medications:   Current Outpatient Medications:     aspirin 81 MG EC tablet, Take 1 tablet by mouth Daily., Disp: 100 tablet, Rfl: 3    benazepril (LOTENSIN) 40 MG  "tablet, Take 1 tablet by mouth Daily., Disp: 90 tablet, Rfl: 1    Cholecalciferol (Vitamin D3) 10 MCG (400 UNIT) capsule, Take 4,000 Units by mouth Daily., Disp: , Rfl:     NIFEdipine CC (ADALAT CC) 60 MG 24 hr tablet, Take 1 tablet by mouth Daily., Disp: 90 tablet, Rfl: 1    predniSONE (DELTASONE) 10 MG tablet, , Disp: , Rfl:     rosuvastatin (CRESTOR) 40 MG tablet, TAKE ONE TABLET BY MOUTH DAILY, Disp: 30 tablet, Rfl: 0    vitamin B-12 (CYANOCOBALAMIN) 1000 MCG tablet, Take 1 tablet by mouth Daily., Disp: , Rfl:     celecoxib (CeleBREX) 200 MG capsule, Take 1 tablet orally 2 times per day with meals., Disp: 60 capsule, Rfl: 1    Allergies:   Allergies   Allergen Reactions    Peanut-Containing Drug Products Hives, Shortness Of Breath and Swelling       I reviewed the patient's chief complaint, history of present illness, review of systems, past medical history, surgical history, family history, social history, medications and allergy list.     Objective    Vital Signs:   Vitals:    08/10/23 1038   BP: 140/80   Weight: 57.6 kg (127 lb)   Height: 147.3 cm (58\")     Body mass index is 26.54 kg/mý.   BMI is >= 25 and <30. (Overweight) The following options were offered after discussion;: exercise counseling/recommendations and nutrition counseling/recommendations     Patient reports that she is a non-smoker and has not ever been a smoker.  This behavior was applauded and she was encouraged to continue in smoking cessation.  We will continue to monitor at subsequent visits.    Ortho Exam:  Constitutional: General Appearance: healthy-appearing, NAD, and normal body habitus.   Psychiatric: Mood and Affect: normal mood and affect and active and alert.   Cardiovascular System: Arterial Pulses Right: radial pulse normal and ulnar pulse normal. Edema Right: none. Varicosities Right: no varicosities and capillary refill test normal.   Wrists: Inspection Right: no erythema, induration, swelling, warmth, or mass and " normal wrist appearance. Palpation of the Radial Aspect Right: no tenderness of the distal forearm, the radial styloid process, the navicular, the trapezoid, the tubercle of radius, the capitate, the first metacarpal, the abductor tendon, the extensor tendon, the flexor carpi radialis, or the palmaris longus. Palpation of the Ulnar Aspect Right: no tenderness of the lunate, the triquetral, the lunotriquetral joint, the hook of hamate, the pisotriquetral joint, the pisiform, the flexor carpi ulnaris, or the canal of guyon and tenderness of the ulnar styloid process and the carpi ulnaris extensor tendon. Active Range of Motion Right: flexion normal, extension normal, pronation normal, supination normal, radial motion normal, thumb motion normal, and ulnar motion (5 deg.). Strength Right: extension 5/5, flexion 5/5, pronation 5/5, supination 5/5, radial deviation 5/5, thumb 5/5,  5/5, interossei 5/5, and ulnar deviation 4/5. Stability Right: [place]-Littler test negative, lunotriquetral ballottement test negative, Owens's scaphoid shift test negative, and pivot shift test of midcarpal joint negative.   Skin: Right Upper Extremity: normal.   Neurological System: Sensation on the Right: normal distal extremities. Special Tests on the Right: TFCC grind test negative and ECU subluxation test negative.    Results Review:   Imaging Results (Last 24 Hours)       ** No results found for the last 24 hours. **        I personally reviewed the radiographs of the right wrist.  No acute fracture or dislocation.  There is soft tissue swelling off of the ulnar side of the wrist.  There is significant CMC joint as well as scaphoid trapezium osteoarthritis.    Procedures    Assessment / Plan    Assessment/Plan:   Diagnoses and all orders for this visit:    1. Extensor carpi ulnaris tendinitis (Primary)  -     Ambulatory Referral to Occupational Therapy    2. Swelling of right wrist  -     Ambulatory Referral to Rheumatology    3.  Primary osteoarthritis of first carpometacarpal joint of right hand    4. Other secondary osteoarthritis of right wrist  -     celecoxib (CeleBREX) 200 MG capsule; Take 1 tablet orally 2 times per day with meals.  Dispense: 60 capsule; Refill: 1  -     Ambulatory Referral to Rheumatology  -     Ambulatory Referral to Occupational Therapy      Worsening right hand and wrist pain and swelling over the last couple of weeks.  She denies any long-term or chronic issue.  Based off of her radiographs, she has both acute and chronic issues at play.  Acutely, she is tender over the radial styloid through the ECU tendon.  Radiographs show soft tissue swelling at this site.  This is also present on exam.  She has pain proximally towards the elbow.  More chronically, her radiographs show advanced degenerative disc disease in the carpal bones of the hand/wrist as well as at the CMC joint.  She has had abnormal rheumatological numbers in the recent laboratory results.  She has chronic swelling and disfiguring of her fingers and hand.  This is led to grossly reduced  strength.  Based off these findings, I do think she warrants a rheumatological evaluation.  I placed that referral today.  I will provide her a work note holding her for work for the next 3 weeks.  For the acute issues, I will start her on Celebrex to help with pain and swelling.  Will get her into hand therapy for additional treatments.  I will see her back in 6 weeks to monitor her response.    Previous imaging studies reviewed: 7/27/2023-radiographs of the right wrist.    Previous laboratory results reviewed: 8/4/2023-ESR 68, rheumatoid factor 10.1, CRP 4.14, WEN negative.    Follow Up:   Return in about 6 weeks (around 9/21/2023) for Recheck.      Familia Mendez MD  [facility] [place] [facility]

## 2023-08-11 DIAGNOSIS — M06.9 RHEUMATOID ARTHRITIS INVOLVING MULTIPLE SITES, UNSPECIFIED WHETHER RHEUMATOID FACTOR PRESENT: Primary | ICD-10-CM

## 2023-08-14 RX ORDER — ROSUVASTATIN CALCIUM 40 MG/1
TABLET, COATED ORAL
Qty: 30 TABLET | Refills: 0 | Status: SHIPPED | OUTPATIENT
Start: 2023-08-14

## 2023-08-15 ENCOUNTER — TELEPHONE (OUTPATIENT)
Dept: INTERNAL MEDICINE | Facility: CLINIC | Age: 68
End: 2023-08-15

## 2023-08-15 NOTE — TELEPHONE ENCOUNTER
Caller: Greta Marino    Relationship: Self    Best call back number: [phone]    What is the best time to reach you: ANYTIME     Who are you requesting to speak with (clinical staff, provider,  specific staff member): CLINICAL STAFF    What was the call regarding: PATIENT IS CALLING TO SEE WHAT HER PROVIDER WOULD SUGGEST THAT SHE TAKE FOR HER RIGHT WRIST PAIN. SHE SAYS THAT SHE HAS BEEN TAKING THE TYLENOL ARTHRITIS MEDICINE EVERY FOUR HOURS AND SHE HAS BEEN ICING IT.     Is it okay if the provider responds through MyChart: YES

## 2023-08-16 ENCOUNTER — TELEPHONE (OUTPATIENT)
Dept: INTERNAL MEDICINE | Facility: CLINIC | Age: 68
End: 2023-08-16

## 2023-08-16 NOTE — TELEPHONE ENCOUNTER
Hub staff attempted to follow warm transfer process and was unsuccessful     Caller: Greta Marino    Relationship to patient: Self    Best call back number: [phone]    PATIENT RETURNED A CALL FROM KEKE

## 2023-08-16 NOTE — TELEPHONE ENCOUNTER
Patient states she is taking Tylenol and Celebrex and that only gives her around 4 hours of relief. Please advise

## 2023-08-17 NOTE — TELEPHONE ENCOUNTER
Patient called back to check to see if there were any recommendations.  She stated that she also had appt with PT (1:00) and Rheumatology (2:30) today as well and was hoping they may be able to recommend something. Patient is hoping for a call back today if possible.

## 2023-08-21 ENCOUNTER — OFFICE VISIT (OUTPATIENT)
Dept: INTERNAL MEDICINE | Facility: CLINIC | Age: 68
End: 2023-08-21
Payer: MEDICARE

## 2023-08-21 VITALS
TEMPERATURE: 96.9 F | DIASTOLIC BLOOD PRESSURE: 60 MMHG | BODY MASS INDEX: 27.33 KG/M2 | WEIGHT: 130.2 LBS | SYSTOLIC BLOOD PRESSURE: 115 MMHG | HEIGHT: 58 IN | OXYGEN SATURATION: 96 % | HEART RATE: 100 BPM

## 2023-08-21 DIAGNOSIS — I10 ESSENTIAL HYPERTENSION: ICD-10-CM

## 2023-08-21 DIAGNOSIS — E78.2 MIXED HYPERLIPIDEMIA: ICD-10-CM

## 2023-08-21 DIAGNOSIS — E55.9 VITAMIN D DEFICIENCY: ICD-10-CM

## 2023-08-21 DIAGNOSIS — I35.9 AORTIC VALVE DISORDER: ICD-10-CM

## 2023-08-21 DIAGNOSIS — Z12.31 ENCOUNTER FOR SCREENING MAMMOGRAM FOR MALIGNANT NEOPLASM OF BREAST: ICD-10-CM

## 2023-08-21 DIAGNOSIS — Z00.00 MEDICARE ANNUAL WELLNESS VISIT, SUBSEQUENT: Primary | ICD-10-CM

## 2023-08-21 DIAGNOSIS — Z78.0 POST-MENOPAUSAL: ICD-10-CM

## 2023-08-21 DIAGNOSIS — Z23 NEED FOR PNEUMOCOCCAL 20-VALENT CONJUGATE VACCINATION: ICD-10-CM

## 2023-08-21 DIAGNOSIS — Z00.00 ROUTINE GENERAL MEDICAL EXAMINATION AT A HEALTH CARE FACILITY: ICD-10-CM

## 2023-08-21 DIAGNOSIS — N13.5 URETEROPELVIC JUNCTION (UPJ) OBSTRUCTION: ICD-10-CM

## 2023-08-21 PROCEDURE — 99397 PER PM REEVAL EST PAT 65+ YR: CPT | Performed by: NURSE PRACTITIONER

## 2023-08-21 PROCEDURE — 1160F RVW MEDS BY RX/DR IN RCRD: CPT | Performed by: NURSE PRACTITIONER

## 2023-08-21 PROCEDURE — 1159F MED LIST DOCD IN RCRD: CPT | Performed by: NURSE PRACTITIONER

## 2023-08-21 PROCEDURE — 3078F DIAST BP <80 MM HG: CPT | Performed by: NURSE PRACTITIONER

## 2023-08-21 PROCEDURE — 99214 OFFICE O/P EST MOD 30 MIN: CPT | Performed by: NURSE PRACTITIONER

## 2023-08-21 PROCEDURE — 3074F SYST BP LT 130 MM HG: CPT | Performed by: NURSE PRACTITIONER

## 2023-08-21 PROCEDURE — G0439 PPPS, SUBSEQ VISIT: HCPCS | Performed by: NURSE PRACTITIONER

## 2023-08-21 NOTE — PROGRESS NOTES
The ABCs of the Annual Wellness Visit  Subsequent Medicare Wellness Visit    Subjective    Greta Marino is a 68 y.o. female who presents for a Subsequent Medicare Wellness Visit.    The following portions of the patient's history were reviewed and   updated as appropriate: allergies, current medications, past family history, past medical history, past social history, past surgical history, and problem list.    Compared to one year ago, the patient feels her physical   health is better.    Compared to one year ago, the patient feels her mental   health is better.    Recent Hospitalizations:  She was admitted within the past 365 days at [facility].       Current Medical Providers:  Patient Care Team:  Alia Vasquez APRN as PCP - General (Family Medicine)  Jaylan Mckay MD as Surgeon (Cardiothoracic Surgery)  Jaylan Redd MD as Consulting Physician (Cardiology)  Jaquelin Dominique APRN as Nurse Practitioner (Cardiology)  Steven Bowling MD as Consulting Physician (Gastroenterology)  Shahid Lopez MD as Consulting Physician (Urology)  Hayley Mckeon APRN as Nurse Practitioner (Gastroenterology)    Outpatient Medications Prior to Visit   Medication Sig Dispense Refill    aspirin 81 MG EC tablet Take 1 tablet by mouth Daily. 100 tablet 3    benazepril (LOTENSIN) 40 MG tablet Take 1 tablet by mouth Daily. 90 tablet 1    celecoxib (CeleBREX) 200 MG capsule Take 1 tablet orally 2 times per day with meals. 60 capsule 1    Cholecalciferol (Vitamin D3) 10 MCG (400 UNIT) capsule Take 4,000 Units by mouth Daily.      NIFEdipine CC (ADALAT CC) 60 MG 24 hr tablet Take 1 tablet by mouth Daily. 90 tablet 1    rosuvastatin (CRESTOR) 40 MG tablet TAKE 1 TABLET BY MOUTH DAILY 30 tablet 0    vitamin B-12 (CYANOCOBALAMIN) 1000 MCG tablet Take 1 tablet by mouth Daily.      predniSONE (DELTASONE) 10 MG tablet        No facility-administered medications prior to visit.       No opioid medication identified on  "active medication list. I have reviewed chart for other potential  high risk medication/s and harmful drug interactions in the elderly.        Aspirin is on active medication list. Aspirin use is indicated based on review of current medical condition/s. Pros and cons of this therapy have been discussed today. Benefits of this medication outweigh potential harm.  Patient has been encouraged to continue taking this medication.  .      Patient Active Problem List   Diagnosis    Essential hypertension    Mixed hyperlipidemia    Arthritis    Aortic valve disorder    Ureteropelvic junction (UPJ) obstruction    Nephrolithiasis    Sleep apnea    Pericardial effusion (S/P Drain)    Pancreatic cyst    History of transcatheter aortic valve replacement (TAVR)    Ureteropelvic junction (UPJ) obstruction, right     Advance Care Planning   Advance Care Planning     Advance Directive is not on file.  ACP discussion was held with the patient during this visit. Patient does not have an advance directive, information provided.     Objective    Vitals:    23 1353   BP: 115/60   Pulse: 100   Temp: 96.9 °F (36.1 °C)   SpO2: 96%   Weight: 59.1 kg (130 lb 3.2 oz)   Height: 147.3 cm (57.99\")     Estimated body mass index is 27.22 kg/m² as calculated from the following:    Height as of this encounter: 147.3 cm (57.99\").    Weight as of this encounter: 59.1 kg (130 lb 3.2 oz).           Does the patient have evidence of cognitive impairment? No          HEALTH RISK ASSESSMENT    Smoking Status:  Social History     Tobacco Use   Smoking Status Never    Passive exposure: Never   Smokeless Tobacco Never     Alcohol Consumption:  Social History     Substance and Sexual Activity   Alcohol Use Yes    Comment: 1-2 per month     Fall Risk Screen:    STEADI Fall Risk Assessment was completed, and patient is at MODERATE risk for falls. Assessment completed on:2023    Depression Screenin/21/2023     1:51 PM   PHQ-2/PHQ-9 Depression " Screening   Little Interest or Pleasure in Doing Things 0-->not at all   Feeling Down, Depressed or Hopeless 1-->several days   PHQ-9: Brief Depression Severity Measure Score 1       Health Habits and Functional and Cognitive Screenin/21/2023     1:51 PM   Functional & Cognitive Status   Do you have difficulty preparing food and eating? Yes   Do you have difficulty bathing yourself, getting dressed or grooming yourself? Yes   Do you have difficulty using the toilet? No   Do you have difficulty moving around from place to place? No   Do you have trouble with steps or getting out of a bed or a chair? No   Current Diet Well Balanced Diet   Dental Exam Not up to date   Eye Exam Not up to date   Exercise (times per week) 0 times per week   Current Exercises Include No Regular Exercise   Do you need help using the phone?  No   Are you deaf or do you have serious difficulty hearing?  No   Do you need help to go to places out of walking distance? No   Do you need help shopping? No   Do you need help preparing meals?  Yes   Do you need help with housework?  Yes   Do you need help with laundry? No   Do you need help taking your medications? No   Do you need help managing money? No   Do you ever drive or ride in a car without wearing a seat belt? No   Have you felt unusual stress, anger or loneliness in the last month? No   Who do you live with? Child   If you need help, do you have trouble finding someone available to you? No   Have you been bothered in the last four weeks by sexual problems? No       Age-appropriate Screening Schedule:  Refer to the list below for future screening recommendations based on patient's age, sex and/or medical conditions. Orders for these recommended tests are listed in the plan section. The patient has been provided with a written plan.    Health Maintenance   Topic Date Due    DXA SCAN  Never done    ZOSTER VACCINE (1 of 2) Never done    Pneumococcal Vaccine 65+ (2 - PCV) 2023     MAMMOGRAM  06/02/2023    COLORECTAL CANCER SCREENING  08/24/2023    COVID-19 Vaccine (5 - Pfizer series) 08/15/2025 (Originally 2/28/2023)    INFLUENZA VACCINE  10/01/2023    LIPID PANEL  12/05/2023    BMI FOLLOWUP  08/10/2024    ANNUAL WELLNESS VISIT  08/21/2024    TDAP/TD VACCINES (2 - Td or Tdap) 01/01/2025    HEPATITIS C SCREENING  Completed                  CMS Preventative Services Quick Reference  Risk Factors Identified During Encounter  Inactivity/Sedentary: Patient was advised to exercise at least 150 minutes a week per CDC recommendations.  The above risks/problems have been discussed with the patient.  Pertinent information has been shared with the patient in the After Visit Summary.  An After Visit Summary and PPPS were made available to the patient.    Follow Up:   Next Medicare Wellness visit to be scheduled in 1 year.       Additional E&M Note during same encounter follows:  Patient has multiple medical problems which are significant and separately identifiable that require additional work above and beyond the Medicare Wellness Visit.      Chief Complaint  Medicare Wellness-subsequent (Patient in clinic for annual medicare wellness.)    Subjective        HPI  Greta Marino is also being seen today for f/u on chronic conditions    HTN - has been consistently taking BP meds (nifedipine and benazepril) as directed.  BP looks great today.  Denies any medication SE's     HL - chronic; currently taking Simvastatin 40 mg daily.  Denies med SE's.     TAVR on 7/28/22 - procedure went well and DUNCAN has resolved.  She is now being seen annually     S/P Pyelolithotomy and Pyeloplasty on 11/7/2022 per Dr. Lopez due to renal calculi and obstruction.  Now followed yearly     Pancreatic cyst - followed by GI    Has been dealing with Chondrocalcinosis of the right wrist.  Has seen ortho and rheum.  Sx's are improving.     COVID -3/31/2021, 4/23/2021, and 1/30/2022, 10/29/22  Tdap - 2015  Flu shot  "-10/29/22  Pneumovax 23 - 4/4/22  Mamm - 6/2/22  Colon- 8/2020, due in 2023       Review of Systems   Musculoskeletal:  Positive for arthralgias.        Right wrist pain   All other systems reviewed and are negative.    Objective   Vital Signs:  /60   Pulse 100   Temp 96.9 °F (36.1 °C)   Ht 147.3 cm (57.99\")   Wt 59.1 kg (130 lb 3.2 oz)   SpO2 96%   BMI 27.22 kg/m²     Physical Exam  Constitutional:       Appearance: She is well-developed.   HENT:      Head: Normocephalic and atraumatic.   Eyes:      Conjunctiva/sclera: Conjunctivae normal.      Pupils: Pupils are equal, round, and reactive to light.   Cardiovascular:      Rate and Rhythm: Normal rate and regular rhythm.      Heart sounds: Normal heart sounds.   Pulmonary:      Effort: Pulmonary effort is normal.      Breath sounds: Normal breath sounds.   Abdominal:      General: Bowel sounds are normal.      Palpations: Abdomen is soft.   Musculoskeletal:         General: Normal range of motion.      Cervical back: Normal range of motion.   Skin:     General: Skin is warm and dry.   Neurological:      Mental Status: She is alert and oriented to person, place, and time.      Deep Tendon Reflexes: Reflexes are normal and symmetric.   Psychiatric:         Behavior: Behavior normal.         Thought Content: Thought content normal.         Judgment: Judgment normal.                       Assessment and Plan   Diagnoses and all orders for this visit:    1. Medicare annual wellness visit, subsequent (Primary)  -     TSH Rfx On Abnormal To Free T4; Future  -     Lipid Panel; Future  -     Vitamin B12; Future  -     Vitamin D,25-Hydroxy; Future    2. Routine general medical examination at a health care facility  -     TSH Rfx On Abnormal To Free T4; Future  -     Lipid Panel; Future  -     Vitamin B12; Future  -     Vitamin D,25-Hydroxy; Future    3. Essential hypertension  -     TSH Rfx On Abnormal To Free T4; Future  -     Lipid Panel; Future  -     Vitamin " B12; Future  -     Vitamin D,25-Hydroxy; Future    4. Mixed hyperlipidemia  -     TSH Rfx On Abnormal To Free T4; Future  -     Lipid Panel; Future  -     Vitamin B12; Future  -     Vitamin D,25-Hydroxy; Future    5. Aortic valve disorder  -     TSH Rfx On Abnormal To Free T4; Future  -     Lipid Panel; Future  -     Vitamin B12; Future  -     Vitamin D,25-Hydroxy; Future    6. Ureteropelvic junction (UPJ) obstruction  -     TSH Rfx On Abnormal To Free T4; Future  -     Lipid Panel; Future  -     Vitamin B12; Future  -     Vitamin D,25-Hydroxy; Future    7. Vitamin D deficiency  -     TSH Rfx On Abnormal To Free T4; Future  -     Lipid Panel; Future  -     Vitamin B12; Future  -     Vitamin D,25-Hydroxy; Future    8. Encounter for screening mammogram for malignant neoplasm of breast  -     Mammo Screening Digital Tomosynthesis Bilateral With CAD; Future    9. Post-menopausal  -     DEXA Bone Density Axial; Future    10. Need for pneumococcal 20-valent conjugate vaccination  -     Pneumococcal Conjugate Vaccine 20-Valent (PCV20); Future      Labs reviewed  Labs sent  Mamm ordered  DEXA ordered  Prevnar 20 update  Counseling - diet and exercise  Return in about 6 months (around 2/21/2024) for f/u.           Follow Up   Return in about 6 months (around 2/21/2024) for f/u.  Patient was given instructions and counseling regarding her condition or for health maintenance advice. Please see specific information pulled into the AVS if appropriate.

## 2023-08-25 ENCOUNTER — TELEPHONE (OUTPATIENT)
Dept: GASTROENTEROLOGY | Facility: CLINIC | Age: 68
End: 2023-08-25

## 2023-08-25 NOTE — TELEPHONE ENCOUNTER
Hub staff attempted to follow warm transfer process and was unsuccessful     Caller: Greta Marino    Relationship to patient: Self    Best call back number: [phone]  CAN CALL ANY TIME    Patient is needing: PATIENT NEEDING TO RESCHEDULE SCOPE CURRENTLY SCHEDULED FOR 9- WITH DR. ARVIZU. PATIENT REQUESTING SCOPE BE AFTER THE FIRST OF THE YEAR IN LINE WITH HER RESCHEDULED FOLLOW UP WHICH IS NOW SCHEDULED FOR 1-8-2024.

## 2023-08-30 ENCOUNTER — TELEPHONE (OUTPATIENT)
Dept: GASTROENTEROLOGY | Facility: CLINIC | Age: 68
End: 2023-08-30
Payer: MEDICARE

## 2023-08-30 NOTE — TELEPHONE ENCOUNTER
Caller: Greta Marino    Relationship to patient: Self    Best call back number: [phone]     Chief complaint: RESCHEDULE PROCEDURE     Type of visit: COLONOSCOPY     Requested date: NO SPECIFIC DATE     If rescheduling, when is the original appointment: 9/11/2023     Additional notes: PATIENT HAS A COLONOSCOPY SCHEDULED WITH DR ARVIZU ON 9/11/2023. PATIENT IS NEEDING TO RESCHEDULE. PLEASE CALL PATIENT, IF NOT AVAILABLE LEAVE A MESSAGE.

## 2023-08-31 ENCOUNTER — HOSPITAL ENCOUNTER (OUTPATIENT)
Dept: CARDIOLOGY | Facility: HOSPITAL | Age: 68
Discharge: HOME | End: 2023-08-31
Payer: MEDICARE

## 2023-08-31 VITALS — WEIGHT: 130 LBS | HEIGHT: 58 IN | BODY MASS INDEX: 27.29 KG/M2

## 2023-08-31 DIAGNOSIS — I35.9 AORTIC VALVE DISORDER: ICD-10-CM

## 2023-08-31 DIAGNOSIS — Z95.2 HISTORY OF TRANSCATHETER AORTIC VALVE REPLACEMENT (TAVR): ICD-10-CM

## 2023-08-31 LAB
AORTIC DIMENSIONLESS INDEX: 0.4 (DI)
BH CV ECHO MEAS - AI P1/2T: 283.6 MSEC
BH CV ECHO MEAS - AO MAX PG: 49.6 MMHG
BH CV ECHO MEAS - AO MEAN PG: 26 MMHG
BH CV ECHO MEAS - AO ROOT DIAM: 2.8 CM
BH CV ECHO MEAS - AO V2 MAX: 352 CM/SEC
BH CV ECHO MEAS - AO V2 VTI: 63.5 CM
BH CV ECHO MEAS - AVA(I,D): 1.3 CM2
BH CV ECHO MEAS - EDV(CUBED): 47.4 ML
BH CV ECHO MEAS - EDV(MOD-SP2): 89 ML
BH CV ECHO MEAS - EDV(MOD-SP4): 78 ML
BH CV ECHO MEAS - EF(MOD-BP): 66 %
BH CV ECHO MEAS - EF(MOD-SP2): 71.9 %
BH CV ECHO MEAS - EF(MOD-SP4): 62.8 %
BH CV ECHO MEAS - ESV(CUBED): 5.5 ML
BH CV ECHO MEAS - ESV(MOD-SP2): 25 ML
BH CV ECHO MEAS - ESV(MOD-SP4): 29 ML
BH CV ECHO MEAS - FS: 51.4 %
BH CV ECHO MEAS - IVS/LVPW: 1.12 CM
BH CV ECHO MEAS - IVSD: 1.29 CM
BH CV ECHO MEAS - LA DIMENSION: 3.5 CM
BH CV ECHO MEAS - LAT PEAK E' VEL: 9.5 CM/SEC
BH CV ECHO MEAS - LV DIASTOLIC VOL/BSA (35-75): 51.4 CM2
BH CV ECHO MEAS - LV MASS(C)D: 146.3 GRAMS
BH CV ECHO MEAS - LV MAX PG: 14.5 MMHG
BH CV ECHO MEAS - LV MEAN PG: 6.7 MMHG
BH CV ECHO MEAS - LV SYSTOLIC VOL/BSA (12-30): 19.1 CM2
BH CV ECHO MEAS - LV V1 MAX: 171 CM/SEC
BH CV ECHO MEAS - LV V1 VTI: 29.4 CM
BH CV ECHO MEAS - LVIDD: 3.6 CM
BH CV ECHO MEAS - LVIDS: 1.76 CM
BH CV ECHO MEAS - LVOT AREA: 2.8 CM2
BH CV ECHO MEAS - LVOT DIAM: 1.9 CM
BH CV ECHO MEAS - LVPWD: 1.15 CM
BH CV ECHO MEAS - MED PEAK E' VEL: 6.36 CM/SEC
BH CV ECHO MEAS - MV A MAX VEL: 98.1 CM/SEC
BH CV ECHO MEAS - MV DEC SLOPE: 327 CM/SEC2
BH CV ECHO MEAS - MV DEC TIME: 0.31 MSEC
BH CV ECHO MEAS - MV E MAX VEL: 63.5 CM/SEC
BH CV ECHO MEAS - MV E/A: 0.65
BH CV ECHO MEAS - MV P1/2T: 54.5 MSEC
BH CV ECHO MEAS - MVA(P1/2T): 4 CM2
BH CV ECHO MEAS - PA ACC SLOPE: 854.5 CM/SEC2
BH CV ECHO MEAS - PA ACC TIME: 0.12 SEC
BH CV ECHO MEAS - PA V2 MAX: 111 CM/SEC
BH CV ECHO MEAS - PAPD(PI EDV): 3 MMHG
BH CV ECHO MEAS - PI END-D VEL: 83.6 CM/SEC
BH CV ECHO MEAS - SI(MOD-SP2): 42.2 ML/M2
BH CV ECHO MEAS - SI(MOD-SP4): 32.3 ML/M2
BH CV ECHO MEAS - SV(LVOT): 83.3 ML
BH CV ECHO MEAS - SV(MOD-SP2): 64 ML
BH CV ECHO MEAS - SV(MOD-SP4): 49 ML
BH CV ECHO MEAS - TAPSE (>1.6): 1.9 CM
BH CV ECHO MEAS - TR MAX PG: 25.6 MMHG
BH CV ECHO MEAS - TR MAX VEL: 253 CM/SEC
BH CV ECHO MEASUREMENTS AVERAGE E/E' RATIO: 8.01
BH CV VAS BP LEFT ARM: ABNORMAL MMHG
BH CV XLRA - RV BASE: 4.3 CM
BH CV XLRA - RV LENGTH: 7.5 CM
BH CV XLRA - RV MID: 3.7 CM
BH CV XLRA - TDI S': 12.8 CM/SEC
IVRT: 79 MSEC
LEFT ATRIUM VOLUME INDEX: 28.9 ML/M2
LV EF 2D ECHO EST: 65 %

## 2023-08-31 PROCEDURE — 93306 TTE W/DOPPLER COMPLETE: CPT

## 2023-09-05 RX ORDER — SODIUM PICOSULFATE, MAGNESIUM OXIDE, AND ANHYDROUS CITRIC ACID 10; 3.5; 12 MG/160ML; G/160ML; G/160ML
350 LIQUID ORAL TAKE AS DIRECTED
Qty: 350 ML | Refills: 0 | Status: SHIPPED | OUTPATIENT
Start: 2023-09-05

## 2023-09-11 RX ORDER — ROSUVASTATIN CALCIUM 40 MG/1
TABLET, COATED ORAL
Qty: 30 TABLET | Refills: 0 | Status: SHIPPED | OUTPATIENT
Start: 2023-09-11

## 2023-09-21 ENCOUNTER — OFFICE VISIT (OUTPATIENT)
Age: 68
End: 2023-09-21
Payer: MEDICARE

## 2023-09-21 VITALS
HEIGHT: 58 IN | WEIGHT: 126 LBS | BODY MASS INDEX: 26.45 KG/M2 | SYSTOLIC BLOOD PRESSURE: 130 MMHG | DIASTOLIC BLOOD PRESSURE: 84 MMHG

## 2023-09-21 DIAGNOSIS — M25.431 SWELLING OF RIGHT WRIST: ICD-10-CM

## 2023-09-21 DIAGNOSIS — M18.11 PRIMARY OSTEOARTHRITIS OF FIRST CARPOMETACARPAL JOINT OF RIGHT HAND: ICD-10-CM

## 2023-09-21 DIAGNOSIS — M77.8 EXTENSOR CARPI ULNARIS TENDINITIS: Primary | ICD-10-CM

## 2023-09-21 RX ORDER — FOLIC ACID 1 MG/1
1 TABLET ORAL DAILY
COMMUNITY
Start: 2023-09-08

## 2023-09-21 RX ORDER — COLCHICINE 0.6 MG/1
0.6 CAPSULE ORAL EVERY 12 HOURS SCHEDULED
COMMUNITY
Start: 2023-09-01

## 2023-09-21 NOTE — PROGRESS NOTES
[facility] [place] [facility] Office Follow Up Visit     Office Follow Up      Date: 09/21/2023   Patient Name: Greta Marino  MRN: [id number]  YOB: 1955    Referring Physician: No ref. provider found     Chief Complaint:   Chief Complaint   Patient presents with    Follow-up     6 week follow up; Extensor carpi ulnaris tendinitis     History of Present Illness: Greta Marino is a 68 y.o. female who is here today for follow up on right wrist pain.  Since last visit, she has been to see rheumatology.  She has been diagnosed with pseudogout and placed on colchicine.  She is also been in hand therapy.  Wrist is still swollen and weak.  She rates pain at a 4/10.  She has improved but still does not have full desired function of her wrist.    Subjective   Review of Systems: Review of Systems   Constitutional:  Negative for chills, fever, unexpected weight gain and unexpected weight loss.   HENT:  Negative for congestion, postnasal drip and rhinorrhea.    Eyes:  Negative for blurred vision.   Respiratory:  Negative for shortness of breath.    Cardiovascular:  Negative for leg swelling.   Gastrointestinal:  Negative for abdominal pain, nausea and vomiting.   Genitourinary:  Negative for difficulty urinating.   Musculoskeletal:  Positive for arthralgias. Negative for gait problem, joint swelling and myalgias.   Skin:  Negative for skin lesions and wound.   Neurological:  Negative for dizziness, weakness, light-headedness and numbness.   Hematological:  Does not bruise/bleed easily.   Psychiatric/Behavioral:  Negative for depressed mood.       Medications:   Current Outpatient Medications:     aspirin 81 MG EC tablet, Take 1 tablet by mouth Daily., Disp: 100 tablet, Rfl: 3    benazepril (LOTENSIN) 40 MG tablet, Take 1 tablet by mouth Daily., Disp: 90 tablet, Rfl: 1    celecoxib (CeleBREX) 200 MG capsule, Take 1 tablet orally 2 times per day with meals., Disp: 60 capsule,  "Rfl: 1    Cholecalciferol (Vitamin D3) 10 MCG (400 UNIT) capsule, Take 4,000 Units by mouth Daily., Disp: , Rfl:     colchicine 0.6 MG capsule capsule, Take 1 capsule by mouth Every 12 (Twelve) Hours., Disp: , Rfl:     Diclofenac Sodium (VOLTAREN) 1 % gel gel, Apply 4 g topically to the appropriate area as directed As Needed., Disp: , Rfl:     folic acid (FOLVITE) 1 MG tablet, Take 1 tablet by mouth Daily., Disp: , Rfl:     methotrexate 2.5 MG tablet, Take 1 tablet by mouth 1 (One) Time Per Week. 3 tabs in AM and 3 tabs in PM once weekly, Disp: , Rfl:     NIFEdipine CC (ADALAT CC) 60 MG 24 hr tablet, Take 1 tablet by mouth Daily., Disp: 90 tablet, Rfl: 1    rosuvastatin (CRESTOR) 40 MG tablet, TAKE 1 TABLET BY MOUTH DAILY, Disp: 30 tablet, Rfl: 0    vitamin B-12 (CYANOCOBALAMIN) 1000 MCG tablet, Take 1 tablet by mouth Daily., Disp: , Rfl:     Sod Picosulfate-Mag Ox-Cit Acd (Clenpiq) 10-3.5-12 MG-GM -GM/160ML solution, Take 350 mL by mouth Take As Directed. (Patient not taking: Reported on 9/21/2023), Disp: 350 mL, Rfl: 0    Allergies:   Allergies   Allergen Reactions    Peanut-Containing Drug Products Hives, Shortness Of Breath and Swelling     I have reviewed and updated the patient's chief complaint, history of present illness, review of systems, past medical history, surgical history, family history, social history, medications and allergy list as appropriate.     Objective    Vital Signs:   Vitals:    09/21/23 0938   BP: 130/84   Weight: 57.2 kg (126 lb)   Height: 147.3 cm (58\")     Body mass index is 26.33 kg/m². BMI is >= 25 and <30. (Overweight) The following options were offered after discussion;: exercise counseling/recommendations and nutrition counseling/recommendations     Patient reports that she is a non-smoker and has not ever been a smoker.  This behavior was applauded and she was encouraged to continue in smoking cessation.  We will continue to monitor at subsequent visits.    Ortho Exam:  Wrists: " Inspection Right: no erythema, induration, swelling, warmth, or mass and normal wrist appearance. Palpation of the Radial Aspect Right: no tenderness of the distal forearm, the radial styloid process, the navicular, the trapezoid, the tubercle of radius, the capitate, the first metacarpal, the abductor tendon, the extensor tendon, the flexor carpi radialis, or the palmaris longus. Palpation of the Ulnar Aspect Right: no tenderness of the lunate, the triquetral, the lunotriquetral joint, the hook of hamate, the pisotriquetral joint, the pisiform, the flexor carpi ulnaris, or the canal of guyon and tenderness of the ulnar styloid process and the carpi ulnaris extensor tendon. Active Range of Motion Right: flexion normal, extension normal, pronation normal, supination normal, radial motion normal, thumb motion normal, and ulnar motion (5 deg.). Strength Right: extension 5/5, flexion 5/5, pronation 5/5, supination 5/5, radial deviation 5/5, thumb 5/5,  5/5, interossei 5/5, and ulnar deviation 4/5. Stability Right: Luis Angel-Littler test negative, lunotriquetral ballottement test negative, Owens's scaphoid shift test negative, and pivot shift test of midcarpal joint negative.   Skin: Right Upper Extremity: normal.   Neurological System: Sensation on the Right: normal distal extremities. Special Tests on the Right: TFCC grind test negative and ECU subluxation test negative.    Results Review:   Imaging Results (Last 24 Hours)       ** No results found for the last 24 hours. **            Procedures    Assessment / Plan    Assessment/Plan:   Diagnoses and all orders for this visit:    1. Extensor carpi ulnaris tendinitis (Primary)    2. Primary osteoarthritis of first carpometacarpal joint of right hand    3. Swelling of right wrist      Follow-up on right wrist pain.  She still has swelling and pain in the wrist joint from the osteoarthritis.  Her tendinitis through the ECU tendon has improved with physical therapy and  Celebrex.  She is also on colchicine as prescribed by her rheumatologist.  She has better use and function of the wrist but certainly not completely please.  I believe this time of the osteoarthritis in the wrist is causing most of her issue.  I recommended that she continue with therapy.  We can consider periodic injections moving forward but I would want to see how her strength responds to conservative therapy.  I will see her back in 6 weeks to monitor her response.    Follow Up:   Return in about 6 weeks (around 11/2/2023) for Recheck.      Familia Mendez MD  [facility] [place] [facility]

## 2023-10-18 RX ORDER — ROSUVASTATIN CALCIUM 40 MG/1
TABLET, COATED ORAL
Qty: 30 TABLET | Refills: 2 | Status: SHIPPED | OUTPATIENT
Start: 2023-10-18

## 2023-11-08 ENCOUNTER — HOSPITAL ENCOUNTER (OUTPATIENT)
Dept: BONE DENSITY | Facility: HOSPITAL | Age: 68
Discharge: HOME | End: 2023-11-08
Admitting: NURSE PRACTITIONER
Payer: MEDICARE

## 2023-11-08 ENCOUNTER — HOSPITAL ENCOUNTER (OUTPATIENT)
Dept: MAMMOGRAPHY | Facility: HOSPITAL | Age: 68
Discharge: HOME | End: 2023-11-08
Payer: MEDICARE

## 2023-11-08 DIAGNOSIS — Z12.31 ENCOUNTER FOR SCREENING MAMMOGRAM FOR MALIGNANT NEOPLASM OF BREAST: ICD-10-CM

## 2023-11-08 DIAGNOSIS — Z78.0 POST-MENOPAUSAL: ICD-10-CM

## 2023-11-08 PROCEDURE — 77067 SCR MAMMO BI INCL CAD: CPT

## 2023-11-08 PROCEDURE — 77063 BREAST TOMOSYNTHESIS BI: CPT

## 2023-11-08 PROCEDURE — 77080 DXA BONE DENSITY AXIAL: CPT

## 2023-11-09 ENCOUNTER — TELEPHONE (OUTPATIENT)
Dept: INTERNAL MEDICINE | Facility: CLINIC | Age: 68
End: 2023-11-09
Payer: MEDICARE

## 2023-11-09 RX ORDER — ALENDRONATE SODIUM 70 MG/1
70 TABLET ORAL
Qty: 12 TABLET | Refills: 3 | Status: SHIPPED | OUTPATIENT
Start: 2023-11-09

## 2023-11-09 NOTE — TELEPHONE ENCOUNTER
----- Message from LIBBY López sent at 11/9/2023  4:07 PM EST -----  Please let patient know that her bone density scan shows osteoporosis.  Has she ever been diagnosed with osteoporosis?  Ever been treated for osteoporosis?

## 2023-11-09 NOTE — TELEPHONE ENCOUNTER
Yes she has been diagnosed with osteoporosis and use to be on fosamax once weekly and would be okay with doing whatever needed to treat

## 2023-11-21 DIAGNOSIS — E78.2 MIXED HYPERLIPIDEMIA: ICD-10-CM

## 2023-11-21 DIAGNOSIS — I10 ESSENTIAL HYPERTENSION: ICD-10-CM

## 2023-11-21 RX ORDER — NIFEDIPINE 60 MG/1
60 TABLET, FILM COATED, EXTENDED RELEASE ORAL DAILY
Qty: 90 TABLET | Refills: 0 | Status: SHIPPED | OUTPATIENT
Start: 2023-11-21

## 2023-11-27 ENCOUNTER — TELEPHONE (OUTPATIENT)
Dept: ORTHOPEDIC SURGERY | Facility: CLINIC | Age: 68
End: 2023-11-27

## 2023-11-27 NOTE — TELEPHONE ENCOUNTER
Caller: Greta Marino    Relationship to patient: Self    Best call back number: [phone]    Patient is needing: PATIENT STATES [employer] MEDICARE ADVANTAGE SHOWS DR. RIVERA OUT OF NETWORK BUT OTHERS ARE IN - CAN WE REACH OUT AND ADVISE

## 2023-12-01 DIAGNOSIS — I35.9 AORTIC VALVE DISORDER: Primary | ICD-10-CM

## 2023-12-01 DIAGNOSIS — Z95.2 HISTORY OF TRANSCATHETER AORTIC VALVE REPLACEMENT (TAVR): ICD-10-CM

## 2023-12-06 ENCOUNTER — OFFICE VISIT (OUTPATIENT)
Age: 68
End: 2023-12-06
Payer: MEDICARE

## 2023-12-06 VITALS
WEIGHT: 127 LBS | BODY MASS INDEX: 24.94 KG/M2 | HEIGHT: 60 IN | SYSTOLIC BLOOD PRESSURE: 140 MMHG | DIASTOLIC BLOOD PRESSURE: 70 MMHG

## 2023-12-06 DIAGNOSIS — M06.9 RHEUMATOID ARTHRITIS INVOLVING MULTIPLE SITES, UNSPECIFIED WHETHER RHEUMATOID FACTOR PRESENT: Primary | ICD-10-CM

## 2023-12-06 DIAGNOSIS — M77.8 EXTENSOR CARPI ULNARIS TENDINITIS: ICD-10-CM

## 2023-12-06 NOTE — PROGRESS NOTES
[facility] Follow Up Visit     Office Follow Up      Date: 12/06/2023   Patient Name: Greta Marino  MRN: [id number]  YOB: 1955    Referring Physician: No ref. provider found     Chief Complaint:   Chief Complaint   Patient presents with    Follow-up     11 week follow up; Extensor carpi ulnaris tendinitis;  Primary osteoarthritis of first carpometacarpal joint of right hand       History of Present Illness: Greta Marino is a 68 y.o. female who is here today for follow up on right wrist pain from rheumatoid arthritis and ECU tendinitis.  We have been treating her for the last 4 months.  Her pain has improved and is down to 1/10.  She has done well with physical therapy.  However, she still has extensive pain at the ulnar side of the wrist.  She is still extensively swollen.  She still has very limited use of her hands from her range of motion and strength standpoint.  She discusses wanting to explore all possible options.    Subjective   Review of Systems: Review of Systems   Constitutional:  Negative for chills, fever, unexpected weight gain and unexpected weight loss.   HENT:  Negative for congestion, postnasal drip and rhinorrhea.    Eyes:  Negative for blurred vision.   Respiratory:  Negative for shortness of breath.    Cardiovascular:  Negative for leg swelling.   Gastrointestinal:  Negative for abdominal pain, nausea and vomiting.   Genitourinary:  Negative for difficulty urinating.   Musculoskeletal:  Positive for arthralgias. Negative for gait problem, joint swelling and myalgias.   Skin:  Negative for skin lesions and wound.   Neurological:  Negative for dizziness, weakness, light-headedness and numbness.   Hematological:  Does not bruise/bleed easily.   Psychiatric/Behavioral:  Negative for depressed mood.         Medications:   Current Outpatient Medications:     alendronate (Fosamax) 70 MG tablet, Take 1 tablet by mouth Every  "7 (Seven) Days., Disp: 12 tablet, Rfl: 3    aspirin 81 MG EC tablet, Take 1 tablet by mouth Daily., Disp: 100 tablet, Rfl: 3    benazepril (LOTENSIN) 40 MG tablet, Take 1 tablet by mouth Daily., Disp: 90 tablet, Rfl: 1    celecoxib (CeleBREX) 200 MG capsule, Take 1 tablet orally 2 times per day with meals., Disp: 60 capsule, Rfl: 1    Cholecalciferol (Vitamin D3) 10 MCG (400 UNIT) capsule, Take 4,000 Units by mouth Daily., Disp: , Rfl:     Diclofenac Sodium (VOLTAREN) 1 % gel gel, Apply 4 g topically to the appropriate area as directed As Needed., Disp: , Rfl:     folic acid (FOLVITE) 1 MG tablet, Take 1 tablet by mouth Daily., Disp: , Rfl:     methotrexate 2.5 MG tablet, Take 1 tablet by mouth 1 (One) Time Per Week. 3 tabs in AM and 3 tabs in PM once weekly, Disp: , Rfl:     NIFEdipine CC (ADALAT CC) 60 MG 24 hr tablet, TAKE ONE TABLET BY MOUTH DAILY, Disp: 90 tablet, Rfl: 0    rosuvastatin (CRESTOR) 40 MG tablet, TAKE 1 TABLET BY MOUTH DAILY, Disp: 30 tablet, Rfl: 2    vitamin B-12 (CYANOCOBALAMIN) 1000 MCG tablet, Take 1 tablet by mouth Daily., Disp: , Rfl:     Sod Picosulfate-Mag Ox-Cit Acd (Clenpiq) 10-3.5-12 MG-GM -GM/160ML solution, Take 350 mL by mouth Take As Directed. (Patient not taking: Reported on 9/21/2023), Disp: 350 mL, Rfl: 0    Allergies:   Allergies   Allergen Reactions    Peanut-Containing Drug Products Hives, Shortness Of Breath and Swelling       I have reviewed and updated the patient's chief complaint, history of present illness, review of systems, past medical history, surgical history, family history, social history, medications and allergy list as appropriate.     Objective    Vital Signs:   Vitals:    12/06/23 1408   BP: 140/70   Weight: 57.6 kg (127 lb)   Height: 152.4 cm (60\")     Body mass index is 24.8 kg/m².  BMI is within normal parameters. No other follow-up for BMI required.     Patient reports that she is a non-smoker and has not ever been a smoker.  This behavior was applauded " and she was encouraged to continue in smoking cessation.  We will continue to monitor at subsequent visits.    Ortho Exam:  Right wrist: No erythema or ecchymosis but significantly swollen.  Tender over the ulnar side of the wrist made worse by ulnar deviation and extension.  Nontender throughout the rest the wrist.  3+/5  strength.  Sensation intact to light touch.    Results Review:   Imaging Results (Last 24 Hours)       ** No results found for the last 24 hours. **            Procedures    Assessment / Plan    Assessment/Plan:   Diagnoses and all orders for this visit:    1. Rheumatoid arthritis involving multiple sites, unspecified whether rheumatoid factor present (Primary)    2. Extensor carpi ulnaris tendinitis    Follow-up on right wrist pain and swelling.  We have been treating her for ECU tendinitis with known rheumatoid arthritis as well.  Her pain has been well-managed but we have yet to make improvements in her range of motion or strength in the hand.  I explained that I believe this is likely due to her rheumatoid arthritis and I am reserved in what we can do to help those issues.  Physical therapy has certainly helped though she is having insurance approval issues at the moment.  I certainly think she continues to warrant this treatment.  I understand her desire to explore all options for function of her hand.  I will support this and while I cautioned her that surgical intervention may not be possible, I would still refer her to see Dr. Jara for further evaluation and treatment options.    Follow Up:   Return for F/U with Marek.      Familia Mendez MD  [facility]

## 2023-12-09 DIAGNOSIS — I10 ESSENTIAL HYPERTENSION: ICD-10-CM

## 2023-12-09 DIAGNOSIS — E78.2 MIXED HYPERLIPIDEMIA: ICD-10-CM

## 2023-12-11 RX ORDER — ALENDRONATE SODIUM 10 MG/1
10 TABLET ORAL
Qty: 90 TABLET | Refills: 1 | Status: SHIPPED | OUTPATIENT
Start: 2023-12-11

## 2023-12-12 ENCOUNTER — HOSPITAL ENCOUNTER (OUTPATIENT)
Dept: GENERAL RADIOLOGY | Facility: HOSPITAL | Age: 68
Discharge: HOME | End: 2023-12-12
Admitting: STUDENT IN AN ORGANIZED HEALTH CARE EDUCATION/TRAINING PROGRAM
Payer: MEDICARE

## 2023-12-12 DIAGNOSIS — M18.11 PRIMARY OSTEOARTHRITIS OF FIRST CARPOMETACARPAL JOINT OF RIGHT HAND: ICD-10-CM

## 2023-12-12 DIAGNOSIS — M18.11 PRIMARY OSTEOARTHRITIS OF FIRST CARPOMETACARPAL JOINT OF RIGHT HAND: Primary | ICD-10-CM

## 2023-12-12 PROCEDURE — 73130 X-RAY EXAM OF HAND: CPT

## 2023-12-12 RX ORDER — BENAZEPRIL HYDROCHLORIDE 40 MG/1
40 TABLET, FILM COATED ORAL DAILY
Qty: 90 TABLET | Refills: 0 | Status: SHIPPED | OUTPATIENT
Start: 2023-12-12

## 2023-12-16 DIAGNOSIS — I10 ESSENTIAL HYPERTENSION: ICD-10-CM

## 2023-12-16 DIAGNOSIS — E78.2 MIXED HYPERLIPIDEMIA: ICD-10-CM

## 2023-12-18 ENCOUNTER — TELEPHONE (OUTPATIENT)
Dept: GASTROENTEROLOGY | Facility: CLINIC | Age: 68
End: 2023-12-18
Payer: MEDICARE

## 2023-12-18 NOTE — TELEPHONE ENCOUNTER
Caller: Greta Marino    Relationship to patient: Self    Best call back number:  [phone]         Type of visit: COLONOSCOPY         If rescheduling, when is the original appointment: 01/08/2024     Additional notes:PATIENT NEEDS TO RESCHEDULE COLONOSCOPY, PLEASE CALL PATIENT BACK

## 2023-12-19 ENCOUNTER — OFFICE VISIT (OUTPATIENT)
Age: 68
End: 2023-12-19
Payer: MEDICARE

## 2023-12-19 VITALS
WEIGHT: 127 LBS | HEIGHT: 60 IN | DIASTOLIC BLOOD PRESSURE: 68 MMHG | BODY MASS INDEX: 24.94 KG/M2 | SYSTOLIC BLOOD PRESSURE: 118 MMHG

## 2023-12-19 DIAGNOSIS — M19.039 WRIST ARTHRITIS: Primary | ICD-10-CM

## 2023-12-19 NOTE — PROGRESS NOTES
[facility]     Office Visit       Date: 12/19/2023   Patient Name: Greta Marino  MRN: [id number]  YOB: 1955    Referring Physician: No ref. provider found     Chief Complaint:   Chief Complaint   Patient presents with    Right Hand - Pain     History of Present Illness:   Greta Marino is a 68 y.o. female right-hand-dominant presented to clinic as a new patient with complaints of right ulnar-sided wrist pain.  This has been present for approximately 5 months and began without specific injury or trauma.  There has been pain swelling and stiffness located along the ulnar aspect of the wrist.  Her pain has improved somewhat with time and is currently 2/10.  She has been attending physical therapy and has been using a custom wrist orthosis.  She overall is somewhat better, but has continued to have pain which she feels is bothersome and prevents her from her activities of daily living.  She is also bothered by her lack of wrist range of motion which she states began approximately 5 months ago at the initiation of her ulnar-sided wrist pain.  Otherwise there is no numbness or tingling.  No other complaints or concerns.    Of note the patient has a history of inflammatory arthritis that is not rheumatoid and is otherwise not specified.  She feels she is in a flareup of her arthritis as she has had multiple joint pain and swelling.  She is currently on methotrexate and Celebrex per her rheumatologist.  She is scheduled to see them for reevaluation at the beginning of the year.    Subjective   Review of Systems:   Review of Systems   Constitutional:  Negative for chills, fever, unexpected weight gain and unexpected weight loss.   HENT:  Negative for congestion, postnasal drip and rhinorrhea.    Eyes:  Negative for blurred vision.   Respiratory:  Negative for shortness of breath.    Cardiovascular:  Negative for leg swelling.  "  Gastrointestinal:  Negative for abdominal pain, nausea and vomiting.   Genitourinary:  Negative for difficulty urinating.   Musculoskeletal:  Positive for arthralgias. Negative for gait problem, joint swelling and myalgias.   Skin:  Negative for skin lesions and wound.   Neurological:  Negative for dizziness, weakness, light-headedness and numbness.   Hematological:  Does not bruise/bleed easily.   Psychiatric/Behavioral:  Negative for depressed mood.         Pertinent review of systems per HPI    I reviewed the patient's chief complaint, history of present illness, review of systems, past medical history, surgical history, family history, social history, medications and allergy list in the EMR on 12/19/2023 and agree with the findings above.    Objective    Quality Measures:   ACP:   ACP discussion was declined by the patient.      Tobacco:   Greta Marino  reports that she has never smoked. She has never been exposed to tobacco smoke. She has never used smokeless tobacco..     Vital Signs:   Vitals:    12/19/23 1033   BP: 118/68   Weight: 57.6 kg (127 lb)   Height: 152.4 cm (60\")     BMI: BMI is within normal parameters. No other follow-up for BMI required.     General: No acute distress. Alert and oriented.     Ortho Exam:  Examination of the right upper extremity demonstrates swelling and redness at the dorsal DRUJ.  [place]-neck deformity noted throughout the digits and an adduction contracture of the thumb.  There is tenderness palpation along the dorsal DRUJ with jovanny instability of the DRUJ noted to shuck.  She is nontender at the thumb CMC joint as well as the MCP joints.  She is able to make a composite fist.  She has full pronation but no supination rotating only to neutral.  She has no appreciable wrist flexion or extension.  Sensation is intact to light touch throughout the hand.  Warm and well-perfused distally.    Imaging / Studies:    Imaging Results (Last 24 Hours)       ** No results found for " the last 24 hours. **        X-rays obtained on 12/12/2023 were personally reviewed and interpreted by myself.  These demonstrate severe joint space narrowing at the radiocarpal joint with the appearance of autofusion at the radiolunate articulation.  There is severe thumb CMC arthritis and what looks to be erosive changes at the DRUJ.  There are diffuse arthritic changes noted at the PIP and DIP joints.  Subtle volar subluxation of the index and long finger MCP joints.  These images were compared to x-rays obtained on 7/27/2023 and noted to have progression of significant degenerative changes.    Assessment / Plan    Assessment/Plan:   Greta Marino is a 68 y.o. female with right wrist DRUJ arthritis with instability in the setting of diffuse inflammatory arthritis.    I discussed with the patient their clinical and radiographic findings demonstrate wrist arthritis and erosive changes indicative of DRUJ arthritis.  We had a lengthy discussion regarding the pathophysiology of their diagnosis.  Reviewed with the patient her x-rays and the relevant anatomy.  She is most bothered today by her ulnar-sided wrist pain as well as limited motion of the wrist.  I discussed that these are two issues.  Regarding her limited range of motion, this is due to her wrist arthritis and what appears to be an autofusion of the radiolunate articulation.  Regarding her ulnar-sided wrist pain, there are erosive changes noted at the DRUJ with significant change from x-rays that are dated approximately 5 months apart.  I think her underlying inflammatory arthritis flare has resulted in progressive arthritic changes of her right wrist.  We have a couple options moving forward.  She does report feeling somewhat better with conservative treatment of therapy and custom brace wear.  She may continue this without issue.  Corticosteroid injection was discussed, however does put her at risk of overlying tendon rupture.  Lastly, surgical  intervention in the form of distal ulna Darrach, DRUJ fusion, and limited resection are options.  I think the best option for her moving forward would likely be a Amina procedure with stabilization of the ECU tendon.  I discussed these options with the patient and she seemed agreeable with surgical treatment.  However she did seem concerned/bothered by her lack of wrist motion.  I discussed that surgery to address the distal ulna would only address pain to the ulnar of the wrist and would not change the range of motion of the wrist.  She seemed disheartened by this but understood.  Patient would like to think about surgery but does seem interested in surgical treatment. I would recommend close follow-up with her rheumatologist as she is currently in a flare that may require changes in her medical management.  She is set to be reevaluated by them early next month.  I would like to see her back after evaluation by her rheumatologist for further discussions of surgery.  In the meantime she will continue brace wear, therapy directed exercises, and anti-inflammatories. They were agreeable with the plan.  All questions and concerns were addressed.       ICD-10-CM ICD-9-CM   1. Wrist arthritis  M19.039 716.93     Follow Up:   Return in about 2 weeks (around 1/2/2024) for Follow Up.      Monique Jara MD  [facility] [place] [facility]

## 2023-12-20 RX ORDER — NIFEDIPINE 60 MG/1
60 TABLET, FILM COATED, EXTENDED RELEASE ORAL DAILY
Qty: 30 TABLET | Refills: 0 | Status: SHIPPED | OUTPATIENT
Start: 2023-12-20

## 2024-01-16 RX ORDER — ROSUVASTATIN CALCIUM 40 MG/1
TABLET, COATED ORAL
Qty: 90 TABLET | Refills: 0 | Status: SHIPPED | OUTPATIENT
Start: 2024-01-16

## 2024-02-02 ENCOUNTER — OFFICE VISIT (OUTPATIENT)
Age: 69
End: 2024-02-02
Payer: MEDICARE

## 2024-02-02 VITALS
DIASTOLIC BLOOD PRESSURE: 82 MMHG | HEIGHT: 60 IN | WEIGHT: 126 LBS | SYSTOLIC BLOOD PRESSURE: 128 MMHG | BODY MASS INDEX: 24.74 KG/M2

## 2024-02-02 DIAGNOSIS — M19.039 WRIST ARTHRITIS: Primary | ICD-10-CM

## 2024-02-02 NOTE — PROGRESS NOTES
[facility]     Office Visit       Date: 02/02/2024   Patient Name: Greta Marino  MRN: [id number]  YOB: 1955    Referring Physician: No ref. provider found     Chief Complaint:   Chief Complaint   Patient presents with    Follow-up     6 week follow up; Wrist arthritis      History of Present Illness:   Greta Marino is a 68 y.o. female right-hand-dominant presenting to clinic for follow-up of right wrist pain and decreased range of motion.  At her last clinic visit I discussed with the patient possible distal ulna resection with ECU stabilization to improve her ulnar-sided wrist pain.  Her radiographs demonstrated likely interval autofusion of the radiocarpal joint with erosive changes at the DRUJ.  I discussed following up with her rheumatologist for management of her medications prior to surgical treatment to see if we can improve her pain.  She reports since her last visit she has met with her rheumatologist.  She has been on methotrexate and Celebrex and was recently prescribed Humira.  She has not yet started the Humira.  She continues with physical therapy and custom wrist splinting with improvements in her overall pain to the ulnar side of the wrist.  Her current complaint is mostly that of decreased range of motion of the wrist and digits.  She denies any numbness or tingling.  No other complaints or concerns.    Subjective   Review of Systems:   Review of Systems   Constitutional: Negative.    HENT: Negative.     Eyes: Negative.    Respiratory: Negative.     Cardiovascular: Negative.    Gastrointestinal: Negative.    Endocrine: Negative.    Genitourinary: Negative.    Musculoskeletal:  Positive for arthralgias.   Skin: Negative.    Allergic/Immunologic: Negative.    Neurological: Negative.    Hematological: Negative.    Psychiatric/Behavioral: Negative.        Pertinent review of systems per HPI    I  "reviewed the patient's chief complaint, history of present illness, review of systems, past medical history, surgical history, family history, social history, medications and allergy list in the EMR on 02/02/2024 and agree with the findings above.    Objective    Quality Measures:   ACP:   ACP discussion was declined by the patient.      Tobacco:   Greta Marino  reports that she has never smoked. She has never been exposed to tobacco smoke. She has never used smokeless tobacco..    Vital Signs:   Vitals:    02/02/24 0938   BP: 128/82   Weight: 57.2 kg (126 lb)   Height: 152.4 cm (60\")     BMI: BMI is within normal parameters. No other follow-up for BMI required.     General: No acute distress. Alert and oriented.     Ortho Exam:  Examination of the right upper extremity demonstrates improved swelling at the DRUJ.  [place]-neck deformities of all the digits with an adduction and Z deformity of the thumb.  She is nontender to palpation of the DRUJ.  Nontender at the thumb CMC and MCP joints.  There is instability noted with dorsal and volar shuck.  There is no active or passive motion noted at the small finger PIP joint.  The DIP joints of the small ring and long fingers have no motion.  She is able to make composite fist with the index finger only.  She has full pronation but unable to supinate past neutral.  No active or passive wrist flexion or extension at the radiocarpal joint.  Sensation is intact to light touch throughout the hand.  Warm and well-perfused distally.    Imaging / Studies:    Imaging Results (Last 24 Hours)       ** No results found for the last 24 hours. **        Right hand x-rays obtained on 12/12/2023 were personally reviewed.  Radiologist read is as follows:    1. New erosive changes involving the distal radial ulnar joint with now likely disruption of thedistal radial ulnar joint. Findings are suspicious for inflammatory or crystalline arthropathy.2. Progressive joint space narrowing at the " radiocarpal joint with now bone-on-bonearticulation at the radiocarpal joint. Abnormal alignment of the lunate with volar tilt. Findingslikely related to ligamentous injury likely related to the underlyingsuspected inflammatory or crystalline arthropathy.3. Multifocal areas of likely osteoarthritis involving the DIP and PIP joints of the third throughfifth digits    I reviewed the patient's most recent rheumatology note, date of service 1/12/2024.  Recent rheumatology labs were also reviewed.    Assessment / Plan    Assessment/Plan:   Greta Marino is a 68 y.o. female with right wrist DRUJ arthritis and instability in the setting of diffuse wrist and digit arthritis due to underlying inflammatory arthritis.    I discussed with the patient that their clinical and radiographic exam findings demonstrate diffuse wrist and digit arthritis as well as instability at the DRUJ.  We had a lengthy discussion regarding her clinical and radiographic exam findings.  The pain along the ulnar side of the wrist over the DRUJ has significantly improved since its onset.  She has been performing therapy directed exercises as well as custom bracing and has noted improvements.  She states her primary concern is that of decreased range of motion of the wrist and digits.  I discussed with her that she has likely autofused several joints throughout the digits as well as the wrist.  Surgical treatment to restore range of motion is not feasible.  The goal is to prevent progression of autofusion to the remaining joints.  I discussed that initiation of Humira may halt further progression, however there is no guarantee.  The patient expressed understanding.  The patient would like to hold off on any surgical treatments to the ulnar aspect of her wrist as this is not her primary concern.  She will try the Humira and see how she responds. I also counseled her on the potential of attritional tendon ruptures to the ring and small fingers, as this  may happen due to prolonged inflammation.  I also briefly discussed corticosteroid injection into the DRUJ if her pain increases and she does not wish to pursue surgery.  She expressed understanding. I will see her back every 4 months for reevaluation after her follow-ups with rheumatology.  Patient expressed understanding.  All questions and concerns were addressed.      ICD-10-CM ICD-9-CM   1. Wrist arthritis  M19.039 716.93     Follow Up:   Return in about 4 months (around 6/2/2024) for Follow Up- with repeat xrays.      Monique Jara MD  [facility] [place] [facility]

## 2024-02-22 ENCOUNTER — OFFICE VISIT (OUTPATIENT)
Dept: INTERNAL MEDICINE | Facility: CLINIC | Age: 69
End: 2024-02-22
Payer: MEDICARE

## 2024-02-22 ENCOUNTER — LAB (OUTPATIENT)
Dept: INTERNAL MEDICINE | Facility: CLINIC | Age: 69
End: 2024-02-22
Payer: MEDICARE

## 2024-02-22 VITALS
TEMPERATURE: 97.1 F | HEIGHT: 60 IN | SYSTOLIC BLOOD PRESSURE: 120 MMHG | OXYGEN SATURATION: 99 % | WEIGHT: 132.2 LBS | HEART RATE: 79 BPM | DIASTOLIC BLOOD PRESSURE: 70 MMHG | BODY MASS INDEX: 25.95 KG/M2

## 2024-02-22 DIAGNOSIS — E78.2 MIXED HYPERLIPIDEMIA: ICD-10-CM

## 2024-02-22 DIAGNOSIS — I35.9 AORTIC VALVE DISORDER: Primary | ICD-10-CM

## 2024-02-22 DIAGNOSIS — E55.9 VITAMIN D DEFICIENCY: ICD-10-CM

## 2024-02-22 DIAGNOSIS — M11.20 CHONDROCALCINOSIS: ICD-10-CM

## 2024-02-22 DIAGNOSIS — Z23 NEED FOR PNEUMOCOCCAL VACCINATION: ICD-10-CM

## 2024-02-22 DIAGNOSIS — I35.9 AORTIC VALVE DISORDER: ICD-10-CM

## 2024-02-22 DIAGNOSIS — Z95.2 S/P TAVR (TRANSCATHETER AORTIC VALVE REPLACEMENT): ICD-10-CM

## 2024-02-22 DIAGNOSIS — M19.039 WRIST ARTHRITIS: ICD-10-CM

## 2024-02-22 DIAGNOSIS — I10 ESSENTIAL HYPERTENSION: ICD-10-CM

## 2024-02-22 LAB
25(OH)D3 SERPL-MCNC: 78.9 NG/ML (ref 30–100)
ALBUMIN SERPL-MCNC: 4.2 G/DL (ref 3.5–5.2)
ALBUMIN/GLOB SERPL: 1.8 G/DL
ALP SERPL-CCNC: 90 U/L (ref 39–117)
ALT SERPL W P-5'-P-CCNC: 60 U/L (ref 1–33)
ANION GAP SERPL CALCULATED.3IONS-SCNC: 9.6 MMOL/L (ref 5–15)
AST SERPL-CCNC: 62 U/L (ref 1–32)
BASOPHILS # BLD AUTO: 0.05 10*3/MM3 (ref 0–0.2)
BASOPHILS NFR BLD AUTO: 0.9 % (ref 0–1.5)
BILIRUB SERPL-MCNC: 0.6 MG/DL (ref 0–1.2)
BUN SERPL-MCNC: 18 MG/DL (ref 8–23)
BUN/CREAT SERPL: 26.1 (ref 7–25)
CALCIUM SPEC-SCNC: 9.4 MG/DL (ref 8.6–10.5)
CHLORIDE SERPL-SCNC: 107 MMOL/L (ref 98–107)
CHOLEST SERPL-MCNC: 97 MG/DL (ref 0–200)
CO2 SERPL-SCNC: 23.4 MMOL/L (ref 22–29)
CREAT SERPL-MCNC: 0.69 MG/DL (ref 0.57–1)
DEPRECATED RDW RBC AUTO: 45.2 FL (ref 37–54)
EGFRCR SERPLBLD CKD-EPI 2021: 95.3 ML/MIN/1.73
EOSINOPHIL # BLD AUTO: 0.21 10*3/MM3 (ref 0–0.4)
EOSINOPHIL NFR BLD AUTO: 3.8 % (ref 0.3–6.2)
ERYTHROCYTE [DISTWIDTH] IN BLOOD BY AUTOMATED COUNT: 13.3 % (ref 12.3–15.4)
GLOBULIN UR ELPH-MCNC: 2.4 GM/DL
GLUCOSE SERPL-MCNC: 97 MG/DL (ref 65–99)
HCT VFR BLD AUTO: 37 % (ref 34–46.6)
HDLC SERPL-MCNC: 31 MG/DL (ref 40–60)
HGB BLD-MCNC: 12 G/DL (ref 12–15.9)
IMM GRANULOCYTES # BLD AUTO: 0.01 10*3/MM3 (ref 0–0.05)
IMM GRANULOCYTES NFR BLD AUTO: 0.2 % (ref 0–0.5)
LDLC SERPL CALC-MCNC: 49 MG/DL (ref 0–100)
LDLC/HDLC SERPL: 1.57 {RATIO}
LYMPHOCYTES # BLD AUTO: 1.29 10*3/MM3 (ref 0.7–3.1)
LYMPHOCYTES NFR BLD AUTO: 23.5 % (ref 19.6–45.3)
MCH RBC QN AUTO: 30.5 PG (ref 26.6–33)
MCHC RBC AUTO-ENTMCNC: 32.4 G/DL (ref 31.5–35.7)
MCV RBC AUTO: 93.9 FL (ref 79–97)
MONOCYTES # BLD AUTO: 0.36 10*3/MM3 (ref 0.1–0.9)
MONOCYTES NFR BLD AUTO: 6.6 % (ref 5–12)
NEUTROPHILS NFR BLD AUTO: 3.57 10*3/MM3 (ref 1.7–7)
NEUTROPHILS NFR BLD AUTO: 65 % (ref 42.7–76)
NRBC BLD AUTO-RTO: 0 /100 WBC (ref 0–0.2)
PLATELET # BLD AUTO: 216 10*3/MM3 (ref 140–450)
PMV BLD AUTO: 10.2 FL (ref 6–12)
POTASSIUM SERPL-SCNC: 4.9 MMOL/L (ref 3.5–5.2)
PROT SERPL-MCNC: 6.6 G/DL (ref 6–8.5)
RBC # BLD AUTO: 3.94 10*6/MM3 (ref 3.77–5.28)
SODIUM SERPL-SCNC: 140 MMOL/L (ref 136–145)
TRIGL SERPL-MCNC: 87 MG/DL (ref 0–150)
TSH SERPL DL<=0.05 MIU/L-ACNC: 0.62 UIU/ML (ref 0.27–4.2)
VIT B12 BLD-MCNC: >2000 PG/ML (ref 211–946)
VLDLC SERPL-MCNC: 17 MG/DL (ref 5–40)
WBC NRBC COR # BLD AUTO: 5.49 10*3/MM3 (ref 3.4–10.8)

## 2024-02-22 PROCEDURE — 82607 VITAMIN B-12: CPT | Performed by: NURSE PRACTITIONER

## 2024-02-22 PROCEDURE — 80053 COMPREHEN METABOLIC PANEL: CPT | Performed by: NURSE PRACTITIONER

## 2024-02-22 PROCEDURE — 85025 COMPLETE CBC W/AUTO DIFF WBC: CPT | Performed by: NURSE PRACTITIONER

## 2024-02-22 PROCEDURE — 82306 VITAMIN D 25 HYDROXY: CPT | Performed by: NURSE PRACTITIONER

## 2024-02-22 PROCEDURE — 84443 ASSAY THYROID STIM HORMONE: CPT | Performed by: NURSE PRACTITIONER

## 2024-02-22 PROCEDURE — 36415 COLL VENOUS BLD VENIPUNCTURE: CPT | Performed by: NURSE PRACTITIONER

## 2024-02-22 PROCEDURE — 80061 LIPID PANEL: CPT | Performed by: NURSE PRACTITIONER

## 2024-02-22 NOTE — LETTER
[facility]  Vaccine Consent Form    Patient Name:  Greta Marino  Patient :  1955     Vaccine(s) Ordered    Pneumococcal Polysaccharide Vaccine 23-Valent Greater Than or Equal To 3yo Subcutaneous / IM        Screening Checklist  The following questions should be completed prior to vaccination. If you answer “yes” to any question, it does not necessarily mean you should not be vaccinated. It just means we may need to clarify or ask more questions. If a question is unclear, please ask your healthcare provider to explain it.    Yes No   Any fever or moderate to severe illness today (mild illness and/or antibiotic treatment are not contraindications)?     Do you have a history of a serious reaction to any previous vaccinations, such as anaphylaxis, encephalopathy within 7 days, Guillain-[place] syndrome within 6 weeks, seizure?     Have you received any live vaccine(s) (e.g MMR, TARAN) or any other vaccines in the last month (to ensure duplicate doses aren't given)?     Do you have an anaphylactic allergy to latex (DTaP, DTaP-IPV, Hep A, Hep B, MenB, RV, Td, Tdap), baker’s yeast (Hep B, HPV), polysorbates (RSV, nirsevimab, PCV 20, Rotavirrus, Tdap, Shingrix), or gelatin (TARAN, MMR)?     Do you have an anaphylactic allergy to neomycin (Rabies, TARAN, MMR, IPV, Hep A), polymyxin B (IPV), or streptomycin (IPV)?      Any cancer, leukemia, AIDS, or other immune system disorder? (TARAN, MMR, RV)     Do you have a parent, brother, or sister with an immune system problem (if immune competence of vaccine recipient clinically verified, can proceed)? (MMR, TARAN)     Any recent steroid treatments for >2 weeks, chemotherapy, or radiation treatment? (TARAN, MMR)     Have you received antibody-containing blood transfusions or IVIG in the past 11 months (recommended interval is dependent on product)? (MMR, TARAN)     Have you taken antiviral drugs (acyclovir, famciclovir, valacyclovir for TARAN) in the last 24 or 48 hours, respectively?   "    Are you pregnant or planning to become pregnant within 1 month? (TARAN, MMR, HPV, IPV, MenB, Abrexvy; For Hep B- refer to Engerix-B; For RSV - Abrysvo is indicated for 32-36 weeks of pregnancy from September to January)     For infants, have you ever been told your child has had intussusception or a medical emergency involving obstruction of the intestine (Rotavirus)? If not for an infant, can skip this question.         *Ordering Physicians/APC should be consulted if \"yes\" is checked by the patient or guardian above.  I have received, read, and understand the Vaccine Information Statement (VIS) for each vaccine ordered.  I have considered my or my child's health status as well as the health status of my close contacts.  I have taken the opportunity to discuss my vaccine questions with my or my child's health care provider.   I have requested that the ordered vaccine(s) be given to me or my child.  I understand the benefits and risks of the vaccines.  I understand that I should remain in the clinic for 15 minutes after receiving the vaccine(s).  _________________________________________________________  Signature of Patient or Parent/Legal Guardian ____________________  Date     "

## 2024-02-22 NOTE — PROGRESS NOTES
Subjective   Greta Marino is a 68 y.o. female    Chief Complaint   Patient presents with    Essential hypertension     History of Present Illness     HTN - has been consistently taking BP meds (nifedipine and benazepril) as directed.  BP looks great today.  Denies any medication SE's     HL - chronic; currently taking Crestor 40 mg daily  Lab Results   Component Value Date    CHOL 180 12/05/2022    CHLPL 123 03/24/2021    TRIG 100 12/05/2022    HDL 43 12/05/2022     (H) 12/05/2022     TAVR on 7/28/22 - procedure went well and DUNCAN has resolved.  She is now being seen annually     S/P Pyelolithotomy and Pyeloplasty on 11/7/2022 per Dr. Lopez due to renal calculi and obstruction.  Now followed yearly     Pancreatic cyst - followed by GI     Has been dealing with Chondrocalcinosis of the right wrist.  Has seen ortho and rheum.  She has been told this will be permanent.  She is still doing PT     COVID -3/31/2021, 4/23/2021, and 1/30/2022, 10/29/22  Tdap - 2015  Flu shot -10/29/22  Pneumovax 23 - 4/4/22  Mamm - 6/2/22  Colon- 8/2020, scheduled for 4/2024    The following portions of the patient's history were reviewed and updated as appropriate: allergies, current medications, past family history, past medical history, past social history, past surgical history, and problem list.    Current Outpatient Medications:     alendronate (FOSAMAX) 10 MG tablet, Take 1 tablet by mouth Every Morning Before Breakfast., Disp: 90 tablet, Rfl: 1    aspirin 81 MG EC tablet, Take 1 tablet by mouth Daily., Disp: 100 tablet, Rfl: 3    benazepril (LOTENSIN) 40 MG tablet, TAKE ONE TABLET BY MOUTH DAILY, Disp: 90 tablet, Rfl: 0    celecoxib (CeleBREX) 200 MG capsule, Take 1 tablet orally 2 times per day with meals., Disp: 60 capsule, Rfl: 1    Cholecalciferol (Vitamin D3) 10 MCG (400 UNIT) capsule, Take 4,000 Units by mouth Daily., Disp: , Rfl:     Diclofenac Sodium (VOLTAREN) 1 % gel gel, Apply 4 g topically to the appropriate  area as directed As Needed., Disp: , Rfl:     folic acid (FOLVITE) 1 MG tablet, Take 1 tablet by mouth Daily., Disp: , Rfl:     methotrexate 2.5 MG tablet, Take 1 tablet by mouth 1 (One) Time Per Week. 4 tabs in AM and 4 tabs in PM once weekly, Disp: , Rfl:     NIFEdipine CC (ADALAT CC) 60 MG 24 hr tablet, TAKE ONE TABLET BY MOUTH DAILY, Disp: 30 tablet, Rfl: 0    rosuvastatin (CRESTOR) 40 MG tablet, TAKE 1 TABLET BY MOUTH DAILY, Disp: 90 tablet, Rfl: 0    vitamin B-12 (CYANOCOBALAMIN) 1000 MCG tablet, Take 1 tablet by mouth Daily., Disp: , Rfl:      Review of Systems   Constitutional:  Negative for chills, fatigue and fever.   Respiratory:  Negative for cough, chest tightness and shortness of breath.    Cardiovascular:  Negative for chest pain.   Gastrointestinal:  Negative for abdominal pain, diarrhea, nausea and vomiting.   Endocrine: Negative for cold intolerance and heat intolerance.   Musculoskeletal:  Positive for arthralgias.   Neurological:  Negative for dizziness.       Objective   Physical Exam  Constitutional:       Appearance: She is well-developed.   HENT:      Head: Normocephalic and atraumatic.   Eyes:      Conjunctiva/sclera: Conjunctivae normal.      Pupils: Pupils are equal, round, and reactive to light.   Cardiovascular:      Rate and Rhythm: Normal rate and regular rhythm.      Heart sounds: Normal heart sounds.   Pulmonary:      Effort: Pulmonary effort is normal.      Breath sounds: Normal breath sounds.   Abdominal:      General: Bowel sounds are normal.      Palpations: Abdomen is soft.   Musculoskeletal:      Right wrist: Swelling, deformity and tenderness present. No effusion or lacerations. Decreased range of motion.      Cervical back: Normal range of motion.   Skin:     General: Skin is warm and dry.   Neurological:      Mental Status: She is alert and oriented to person, place, and time.      Deep Tendon Reflexes: Reflexes are normal and symmetric.   Psychiatric:         Behavior:  "Behavior normal.         Thought Content: Thought content normal.         Judgment: Judgment normal.       Vitals:    02/22/24 0916   BP: 120/70   Pulse: 79   Temp: 97.1 °F (36.2 °C)   SpO2: 99%   Weight: 60 kg (132 lb 3.2 oz)   Height: 152.4 cm (60\")         Assessment & Plan   Diagnoses and all orders for this visit:    1. Aortic valve disorder (Primary)  -     CBC & Differential; Future  -     Comprehensive Metabolic Panel; Future  -     Lipid Panel; Future  -     TSH; Future  -     Vitamin B12; Future  -     Vitamin D,25-Hydroxy; Future    2. S/P TAVR (transcatheter aortic valve replacement)  -     CBC & Differential; Future  -     Comprehensive Metabolic Panel; Future  -     Lipid Panel; Future  -     TSH; Future  -     Vitamin B12; Future  -     Vitamin D,25-Hydroxy; Future    3. Need for pneumococcal vaccination  -     Pneumococcal Polysaccharide Vaccine 23-Valent Greater Than or Equal To 3yo Subcutaneous / IM    4. Essential hypertension  -     CBC & Differential; Future  -     Comprehensive Metabolic Panel; Future  -     Lipid Panel; Future  -     TSH; Future  -     Vitamin B12; Future  -     Vitamin D,25-Hydroxy; Future    5. Mixed hyperlipidemia  -     CBC & Differential; Future  -     Comprehensive Metabolic Panel; Future  -     Lipid Panel; Future  -     TSH; Future  -     Vitamin B12; Future  -     Vitamin D,25-Hydroxy; Future    6. Chondrocalcinosis  -     CBC & Differential; Future  -     Comprehensive Metabolic Panel; Future  -     Lipid Panel; Future  -     TSH; Future  -     Vitamin B12; Future  -     Vitamin D,25-Hydroxy; Future    7. Wrist arthritis  -     CBC & Differential; Future  -     Comprehensive Metabolic Panel; Future  -     Lipid Panel; Future  -     TSH; Future  -     Vitamin B12; Future  -     Vitamin D,25-Hydroxy; Future    8. Vitamin D deficiency  -     CBC & Differential; Future  -     Comprehensive Metabolic Panel; Future  -     Lipid Panel; Future  -     TSH; Future  -     " Vitamin B12; Future  -     Vitamin D,25-Hydroxy; Future        Labs sent  No med changes  Pneumovax 23 updated  Keep close follow-up with specialist as scheduled  Return in about 6 months (around 8/22/2024) for Annual, collect labs today.

## 2024-02-27 DIAGNOSIS — M19.039 WRIST ARTHRITIS: Primary | ICD-10-CM

## 2024-02-29 ENCOUNTER — TELEPHONE (OUTPATIENT)
Dept: INTERNAL MEDICINE | Facility: CLINIC | Age: 69
End: 2024-02-29
Payer: MEDICARE

## 2024-02-29 DIAGNOSIS — R94.5 ABNORMAL RESULTS OF LIVER FUNCTION STUDIES: ICD-10-CM

## 2024-02-29 DIAGNOSIS — R74.8 ELEVATED LIVER ENZYMES: Primary | ICD-10-CM

## 2024-03-01 ENCOUNTER — LAB (OUTPATIENT)
Dept: LAB | Facility: HOSPITAL | Age: 69
End: 2024-03-01
Payer: MEDICARE

## 2024-03-01 DIAGNOSIS — R74.8 ELEVATED LIVER ENZYMES: ICD-10-CM

## 2024-03-01 DIAGNOSIS — R94.5 ABNORMAL RESULTS OF LIVER FUNCTION STUDIES: ICD-10-CM

## 2024-03-01 LAB
ALBUMIN SERPL-MCNC: 4.6 G/DL (ref 3.5–5.2)
ALP SERPL-CCNC: 94 U/L (ref 39–117)
ALT SERPL W P-5'-P-CCNC: 64 U/L (ref 1–33)
ANION GAP SERPL CALCULATED.3IONS-SCNC: 10 MMOL/L (ref 5–15)
AST SERPL-CCNC: 53 U/L (ref 1–32)
BILIRUB CONJ SERPL-MCNC: <0.2 MG/DL (ref 0–0.3)
BILIRUB INDIRECT SERPL-MCNC: ABNORMAL MG/DL
BILIRUB SERPL-MCNC: 0.6 MG/DL (ref 0–1.2)
BUN SERPL-MCNC: 18 MG/DL (ref 8–23)
BUN/CREAT SERPL: 23.1 (ref 7–25)
CALCIUM SPEC-SCNC: 9.5 MG/DL (ref 8.6–10.5)
CHLORIDE SERPL-SCNC: 104 MMOL/L (ref 98–107)
CO2 SERPL-SCNC: 25 MMOL/L (ref 22–29)
CREAT SERPL-MCNC: 0.78 MG/DL (ref 0.57–1)
EGFRCR SERPLBLD CKD-EPI 2021: 83.4 ML/MIN/1.73
GLUCOSE SERPL-MCNC: 82 MG/DL (ref 65–99)
HAV IGM SERPL QL IA: NORMAL
HBV CORE IGM SERPL QL IA: NORMAL
HBV SURFACE AG SERPL QL IA: NORMAL
HCV AB SER DONR QL: NORMAL
POTASSIUM SERPL-SCNC: 4.3 MMOL/L (ref 3.5–5.2)
PROT SERPL-MCNC: 6.8 G/DL (ref 6–8.5)
SODIUM SERPL-SCNC: 139 MMOL/L (ref 136–145)

## 2024-03-01 PROCEDURE — 80074 ACUTE HEPATITIS PANEL: CPT

## 2024-03-01 PROCEDURE — 80076 HEPATIC FUNCTION PANEL: CPT

## 2024-03-01 PROCEDURE — 80048 BASIC METABOLIC PNL TOTAL CA: CPT

## 2024-03-07 ENCOUNTER — TELEPHONE (OUTPATIENT)
Dept: GASTROENTEROLOGY | Facility: CLINIC | Age: 69
End: 2024-03-07
Payer: MEDICARE

## 2024-03-07 NOTE — TELEPHONE ENCOUNTER
Caller: Greta Marino    Relationship to patient: Self    Best call back number: [phone]     Chief complaint: SCHEDULING CONFLICT--TRANSPORT OUT OF TOWN    Type of visit: COLONOSCOPY    Requested date: ANY MONDAY IN MAY     If rescheduling, when is the original appointment: 4/22/24

## 2024-03-08 DIAGNOSIS — E78.2 MIXED HYPERLIPIDEMIA: ICD-10-CM

## 2024-03-08 DIAGNOSIS — I10 ESSENTIAL HYPERTENSION: ICD-10-CM

## 2024-03-08 RX ORDER — BENAZEPRIL HYDROCHLORIDE 40 MG/1
40 TABLET ORAL DAILY
Qty: 90 TABLET | Refills: 1 | Status: SHIPPED | OUTPATIENT
Start: 2024-03-08

## 2024-03-11 DIAGNOSIS — R74.8 ELEVATED LIVER ENZYMES: Primary | ICD-10-CM

## 2024-04-08 ENCOUNTER — HOSPITAL ENCOUNTER (OUTPATIENT)
Dept: CARDIOLOGY | Facility: HOSPITAL | Age: 69
Discharge: HOME | End: 2024-04-08
Admitting: INTERNAL MEDICINE
Payer: MEDICARE

## 2024-04-08 VITALS — WEIGHT: 132.28 LBS | BODY MASS INDEX: 25.97 KG/M2 | HEIGHT: 60 IN

## 2024-04-08 DIAGNOSIS — I35.9 AORTIC VALVE DISORDER: ICD-10-CM

## 2024-04-08 DIAGNOSIS — Z95.2 HISTORY OF TRANSCATHETER AORTIC VALVE REPLACEMENT (TAVR): ICD-10-CM

## 2024-04-08 PROCEDURE — 93306 TTE W/DOPPLER COMPLETE: CPT | Performed by: INTERNAL MEDICINE

## 2024-04-08 PROCEDURE — 93306 TTE W/DOPPLER COMPLETE: CPT

## 2024-04-09 LAB
ASCENDING AORTA: 3.5 CM
BH CV ECHO MEAS - AI P1/2T: 371.3 MSEC
BH CV ECHO MEAS - AO MAX PG: 22.9 MMHG
BH CV ECHO MEAS - AO MEAN PG: 12.2 MMHG
BH CV ECHO MEAS - AO ROOT DIAM: 3.6 CM
BH CV ECHO MEAS - AO V2 MAX: 238.9 CM/SEC
BH CV ECHO MEAS - AO V2 VTI: 51.6 CM
BH CV ECHO MEAS - AVA(I,D): 1.5 CM2
BH CV ECHO MEAS - EDV(CUBED): 94.6 ML
BH CV ECHO MEAS - EDV(MOD-SP2): 72.7 ML
BH CV ECHO MEAS - EDV(MOD-SP4): 62.9 ML
BH CV ECHO MEAS - EF(MOD-SP2): 81.5 %
BH CV ECHO MEAS - EF(MOD-SP4): 76.1 %
BH CV ECHO MEAS - ESV(CUBED): 28.2 ML
BH CV ECHO MEAS - ESV(MOD-SP2): 13.4 ML
BH CV ECHO MEAS - ESV(MOD-SP4): 15 ML
BH CV ECHO MEAS - FS: 33.2 %
BH CV ECHO MEAS - IVS/LVPW: 0.8 CM
BH CV ECHO MEAS - IVSD: 0.73 CM
BH CV ECHO MEAS - LA DIMENSION: 3.1 CM
BH CV ECHO MEAS - LAT PEAK E' VEL: 10 CM/SEC
BH CV ECHO MEAS - LV DIASTOLIC VOL/BSA (35-75): 40.2 CM2
BH CV ECHO MEAS - LV MASS(C)D: 120.7 GRAMS
BH CV ECHO MEAS - LV MAX PG: 4.4 MMHG
BH CV ECHO MEAS - LV MEAN PG: 2.46 MMHG
BH CV ECHO MEAS - LV SYSTOLIC VOL/BSA (12-30): 9.6 CM2
BH CV ECHO MEAS - LV V1 MAX: 104.8 CM/SEC
BH CV ECHO MEAS - LV V1 VTI: 24.5 CM
BH CV ECHO MEAS - LVIDD: 4.6 CM
BH CV ECHO MEAS - LVIDS: 3 CM
BH CV ECHO MEAS - LVOT AREA: 3.2 CM2
BH CV ECHO MEAS - LVOT DIAM: 2.01 CM
BH CV ECHO MEAS - LVPWD: 0.91 CM
BH CV ECHO MEAS - MED PEAK E' VEL: 7 CM/SEC
BH CV ECHO MEAS - MV A MAX VEL: 89.8 CM/SEC
BH CV ECHO MEAS - MV DEC SLOPE: 388.9 CM/SEC2
BH CV ECHO MEAS - MV DEC TIME: 0.2 SEC
BH CV ECHO MEAS - MV E MAX VEL: 77.2 CM/SEC
BH CV ECHO MEAS - MV E/A: 0.86
BH CV ECHO MEAS - MV MAX PG: 7.6 MMHG
BH CV ECHO MEAS - MV MEAN PG: 2.9 MMHG
BH CV ECHO MEAS - MV V2 VTI: 37 CM
BH CV ECHO MEAS - MVA(VTI): 2.1 CM2
BH CV ECHO MEAS - PA ACC TIME: 0.14 SEC
BH CV ECHO MEAS - PA V2 MAX: 91.8 CM/SEC
BH CV ECHO MEAS - RAP SYSTOLE: 3 MMHG
BH CV ECHO MEAS - RVSP: 26 MMHG
BH CV ECHO MEAS - SI(MOD-SP2): 37.9 ML/M2
BH CV ECHO MEAS - SI(MOD-SP4): 30.6 ML/M2
BH CV ECHO MEAS - SV(LVOT): 77.6 ML
BH CV ECHO MEAS - SV(MOD-SP2): 59.2 ML
BH CV ECHO MEAS - SV(MOD-SP4): 47.8 ML
BH CV ECHO MEAS - TAPSE (>1.6): 2.1 CM
BH CV ECHO MEAS - TR MAX PG: 22.6 MMHG
BH CV ECHO MEAS - TR MAX VEL: 237.7 CM/SEC
BH CV ECHO MEASUREMENTS AVERAGE E/E' RATIO: 9.08
BH CV VAS BP LEFT ARM: NORMAL MMHG
BH CV XLRA - RV BASE: 3.3 CM
BH CV XLRA - RV LENGTH: 7.5 CM
BH CV XLRA - RV MID: 2.8 CM
BH CV XLRA - TDI S': 11 CM/SEC
IVRT: 88 MS
LEFT ATRIUM VOLUME INDEX: 28 ML/M2
LV EF 2D ECHO EST: 70 %

## 2024-04-10 DIAGNOSIS — E78.2 MIXED HYPERLIPIDEMIA: ICD-10-CM

## 2024-04-10 DIAGNOSIS — I10 ESSENTIAL HYPERTENSION: ICD-10-CM

## 2024-04-10 NOTE — TELEPHONE ENCOUNTER
Rx Refill Note  Requested Prescriptions     Pending Prescriptions Disp Refills    NIFEdipine CC (ADALAT CC) 60 MG 24 hr tablet [Pharmacy Med Name: NIFEdipine ER 60 MG TABLET] 30 tablet 0     Sig: TAKE 1 TABLET BY MOUTH DAILY      Last office visit with prescribing clinician: 2/22/2024   Last telemedicine visit with prescribing clinician: Visit date not found   Next office visit with prescribing clinician: 8/26/2024       Nicolle Sharma MA  04/10/24, 08:23 EDT

## 2024-04-11 RX ORDER — ROSUVASTATIN CALCIUM 40 MG/1
TABLET, COATED ORAL
Qty: 90 TABLET | Refills: 0 | Status: SHIPPED | OUTPATIENT
Start: 2024-04-11

## 2024-04-11 NOTE — TELEPHONE ENCOUNTER
Rx Refill Note  Requested Prescriptions     Pending Prescriptions Disp Refills    rosuvastatin (CRESTOR) 40 MG tablet [Pharmacy Med Name: ROSUVASTATIN CALCIUM 40 MG TAB] 90 tablet 0     Sig: TAKE 1 TABLET BY MOUTH DAILY      Last office visit with prescribing clinician: 2/22/2024   Last telemedicine visit with prescribing clinician: Visit date not found   Next office visit with prescribing clinician: 8/26/2024       Nicolle Sharma MA  04/11/24, 08:26 EDT

## 2024-04-16 ENCOUNTER — HOSPITAL ENCOUNTER (OUTPATIENT)
Dept: ULTRASOUND IMAGING | Facility: HOSPITAL | Age: 69
Discharge: HOME | End: 2024-04-16
Admitting: NURSE PRACTITIONER
Payer: MEDICARE

## 2024-04-16 DIAGNOSIS — R74.8 ELEVATED LIVER ENZYMES: ICD-10-CM

## 2024-04-16 PROCEDURE — 76705 ECHO EXAM OF ABDOMEN: CPT

## 2024-05-08 DIAGNOSIS — I10 ESSENTIAL HYPERTENSION: ICD-10-CM

## 2024-05-08 DIAGNOSIS — E78.2 MIXED HYPERLIPIDEMIA: ICD-10-CM

## 2024-05-10 ENCOUNTER — TELEPHONE (OUTPATIENT)
Dept: INTERNAL MEDICINE | Facility: CLINIC | Age: 69
End: 2024-05-10
Payer: MEDICARE

## 2024-05-24 ENCOUNTER — TELEPHONE (OUTPATIENT)
Dept: GASTROENTEROLOGY | Facility: CLINIC | Age: 69
End: 2024-05-24

## 2024-05-24 NOTE — TELEPHONE ENCOUNTER
Caller: RADHA CRUZ    Relationship to patient: SELF    Best call back number: [phone]    Chief complaint:     Type of visit:    Requested date:     If rescheduling, when is the original appointment:     Additional notes: APPT WAS RESCHEDULED FROM 6.03 TO 7.08. PT NEEDS NEW LETTER REFLECTING THAT CHANGE.

## 2024-06-07 RX ORDER — METHOTREXATE 2.5 MG/1
TABLET ORAL
Qty: 30 TABLET | OUTPATIENT
Start: 2024-06-07

## 2024-06-07 NOTE — TELEPHONE ENCOUNTER
Received refill request for MTX 6 tablets weekly. Patient now takes 8 tablets weekly and rx was sent in for that dose on 4/29/24. Denying this refill request.

## 2024-06-08 DIAGNOSIS — I10 ESSENTIAL HYPERTENSION: ICD-10-CM

## 2024-06-08 DIAGNOSIS — E78.2 MIXED HYPERLIPIDEMIA: ICD-10-CM

## 2024-06-10 ENCOUNTER — OFFICE VISIT (OUTPATIENT)
Dept: CARDIOLOGY | Facility: CLINIC | Age: 69
End: 2024-06-10
Payer: MEDICARE

## 2024-06-10 VITALS
HEIGHT: 58 IN | DIASTOLIC BLOOD PRESSURE: 76 MMHG | SYSTOLIC BLOOD PRESSURE: 150 MMHG | OXYGEN SATURATION: 97 % | BODY MASS INDEX: 28 KG/M2 | WEIGHT: 133.4 LBS | HEART RATE: 83 BPM

## 2024-06-10 DIAGNOSIS — I10 ESSENTIAL HYPERTENSION: ICD-10-CM

## 2024-06-10 DIAGNOSIS — I35.9 AORTIC VALVE DISORDER: Primary | ICD-10-CM

## 2024-06-10 DIAGNOSIS — Z95.2 HISTORY OF TRANSCATHETER AORTIC VALVE REPLACEMENT (TAVR): ICD-10-CM

## 2024-06-10 DIAGNOSIS — E78.2 MIXED HYPERLIPIDEMIA: ICD-10-CM

## 2024-06-10 PROCEDURE — 93000 ELECTROCARDIOGRAM COMPLETE: CPT | Performed by: INTERNAL MEDICINE

## 2024-06-10 PROCEDURE — 3078F DIAST BP <80 MM HG: CPT | Performed by: INTERNAL MEDICINE

## 2024-06-10 PROCEDURE — 1160F RVW MEDS BY RX/DR IN RCRD: CPT | Performed by: INTERNAL MEDICINE

## 2024-06-10 PROCEDURE — 99214 OFFICE O/P EST MOD 30 MIN: CPT | Performed by: INTERNAL MEDICINE

## 2024-06-10 PROCEDURE — 3077F SYST BP >= 140 MM HG: CPT | Performed by: INTERNAL MEDICINE

## 2024-06-10 PROCEDURE — 1159F MED LIST DOCD IN RCRD: CPT | Performed by: INTERNAL MEDICINE

## 2024-06-10 RX ORDER — ALENDRONATE SODIUM 10 MG/1
10 TABLET ORAL
Qty: 90 TABLET | Refills: 1 | Status: SHIPPED | OUTPATIENT
Start: 2024-06-10

## 2024-06-10 NOTE — TELEPHONE ENCOUNTER
Rx Refill Note  Requested Prescriptions     Pending Prescriptions Disp Refills    alendronate (FOSAMAX) 10 MG tablet [Pharmacy Med Name: ALENDRONATE SODIUM 10 MG TAB] 90 tablet 1     Sig: TAKE ONE TABLET BY MOUTH EVERY MORNING BEFORE BREAKFAST      Last office visit with prescribing clinician: 2/22/2024   Last telemedicine visit with prescribing clinician: Visit date not found   Next office visit with prescribing clinician: 8/26/2024       Nicolle Sharma MA  06/10/24, 10:26 EDT

## 2024-06-10 NOTE — PROGRESS NOTES
[facility]  Subjective:     Encounter Date: 06/10/2024      Patient ID: Greta Marino is a 69 y.o. female.    Chief Complaint: Aortic valve disorder (Patient complains of leg swelling)      PROBLEM LIST:  Aortic stenosis   Echo, 05/11/2016: EF 70%. LV has mild concentric hypertrophy. AV was trileaflet. Moderate stenosis mean 20-25 mmHg, max 45-50 mmHg. Moderate AR, MR, and TR. LA mildly dilated.   Echo, 08/17/2020: LVEF 66%. Moderate AI and severe AS with mean gradient 42 mmHg.  Mild MR and TR.  RVSP 23  mmHg. Grade 1 diastolic dysfunction\  Echo, 04/26/2021: EF 60%. Severe aortic valve stenosis is present. Aortic valve area is 0.8 cm2. AV max pressure gradient 76 mmHg, mean 39 mmHg. Mild to moderate AR. Mild MR.  LHC, 06/30/2022 LHC 40% RCA with normal IFR.  Otherwise normal coronaries.  SOURAV, 06/30/2022, Dr. Camara EF 60%.  Bicuspid aortic valve with severe aortic valve stenosis.  TAVR, 07/28/2022: 23 mm Nicolas PALMER III tissue valve  Echo, 08/23/2022: EF 60%.  TAVR valve present.  Mild perivalvular regurgitation.  Mild to moderate MR.  Echo, 08/31/2023: EF 65%. There is a TAVR valve present. Mild paravalvular regurgitation is present in the prosthetic aortic valve.   Echo, 04/08/2024: EF 70%. There is a TAVR valve present. Mild paravalvular regurgitation is present in the prosthetic aortic valve. Aortic valve maximum pressure gradient is 23 mmHg. Aortic valve mean pressure gradient is 12 mmHg. Mild MR.  Hypertension  Hyperlipidemia.   Arthritis  Asthma  Nephrolithiasis   Surgeries:  Ankle surgery, left  Breast biopsy  Knee surgery, right  cytoscopy with stent removal, 2023       History of Present Illness  Greta Marino returns today for a follow up with a history of AV disorder and cardiac risk factors. Since last visit, patient has been doing well overall from a cardiovascular standpoint. She is currently wearing a brace on her right forearm due to pseudo gout that  "eventually turned into osteoarthritis. Patient has noticed some swelling in her legs due to becoming more sedentary from her osteoarthritis. She denies chest pain, shortness of breath, orthopnea, palpitations, dizziness, and syncope.    Allergies   Allergen Reactions    Peanut-Containing Drug Products Hives, Shortness Of Breath and Swelling         Current Outpatient Medications:     alendronate (FOSAMAX) 10 MG tablet, Take 1 tablet by mouth Every Morning Before Breakfast., Disp: 90 tablet, Rfl: 1    aspirin 81 MG EC tablet, Take 1 tablet by mouth Daily., Disp: 100 tablet, Rfl: 3    benazepril (LOTENSIN) 40 MG tablet, TAKE 1 TABLET BY MOUTH DAILY, Disp: 90 tablet, Rfl: 1    Cholecalciferol (Vitamin D3) 10 MCG (400 UNIT) capsule, Take 4,000 Units by mouth Daily., Disp: , Rfl:     Diclofenac Sodium (VOLTAREN) 1 % gel gel, Apply 4 g topically to the appropriate area as directed As Needed., Disp: , Rfl:     folic acid (FOLVITE) 1 MG tablet, Take 1 tablet by mouth Daily., Disp: , Rfl:     NIFEdipine CC (ADALAT CC) 60 MG 24 hr tablet, TAKE 1 TABLET BY MOUTH DAILY, Disp: 30 tablet, Rfl: 0    rosuvastatin (CRESTOR) 40 MG tablet, TAKE 1 TABLET BY MOUTH DAILY, Disp: 90 tablet, Rfl: 0    vitamin B-12 (CYANOCOBALAMIN) 1000 MCG tablet, Take 1 tablet by mouth Daily., Disp: , Rfl:     celecoxib (CeleBREX) 200 MG capsule, Take 1 tablet orally 2 times per day with meals., Disp: 60 capsule, Rfl: 1    methotrexate 2.5 MG tablet, Take 1 tablet by mouth 1 (One) Time Per Week. 4 tabs in AM and 4 tabs in PM once weekly, Disp: , Rfl:     The following portions of the patient's history were reviewed and updated as appropriate: allergies, current medications, past family history, past medical history, past social history, past surgical history and problem list.    ROS       Objective:     Vitals:    06/10/24 0948   BP: 150/76   Pulse: 83   SpO2: 97%   Weight: 60.5 kg (133 lb 6.4 oz)   Height: 147.3 cm (58\")         Vitals reviewed. "   Constitutional:       Appearance: Well-developed and not in distress.   Neck:      Thyroid: No thyromegaly.      Vascular: No carotid bruit or JVD.   Pulmonary:      Breath sounds: Normal breath sounds.   Cardiovascular:      Regular rhythm.      Murmurs: There is a grade 1/6 systolic murmur at the LLSB.      No gallop. No S3 and S4 gallop.   Pulses:     Intact distal pulses.      Carotid: 2+ bilaterally.     Radial: 2+ bilaterally.  Edema:     Peripheral edema absent.   Abdominal:      General: Bowel sounds are normal.      Palpations: Abdomen is soft. There is no abdominal mass.      Tenderness: There is no abdominal tenderness.   Musculoskeletal:         General: No deformity.      Extremities: No clubbing present.Skin:     General: Skin is warm and dry.      Findings: No rash.   Neurological:      Mental Status: Alert and oriented to person, place, and time.         Lab Review:  Lab Results   Component Value Date    GLUCOSE 82 03/01/2024    BUN 18 03/01/2024    CREATININE 0.78 03/01/2024    BCR 23.1 03/01/2024    K 4.3 03/01/2024    CO2 25.0 03/01/2024    CALCIUM 9.5 03/01/2024    ALBUMIN 4.6 03/01/2024    ALKPHOS 94 03/01/2024    AST 53 (H) 03/01/2024    ALT 64 (H) 03/01/2024     Lab Results   Component Value Date    CHOL 97 02/22/2024    TRIG 87 02/22/2024    HDL 31 (L) 02/22/2024    LDL 49 02/22/2024      Lab Results   Component Value Date    WBC 5.49 02/22/2024    RBC 3.94 02/22/2024    HGB 12.0 02/22/2024    HCT 37.0 02/22/2024    MCV 93.9 02/22/2024     02/22/2024     Lab Results   Component Value Date    TSH 0.620 02/22/2024         ECG 12 Lead    Date/Time: 6/10/2024 10:21 AM  Performed by: Jaylan Redd MD    Authorized by: Jaylan Redd MD  Comparison: compared with previous ECG from 7/30/2022  Similar to previous ECG  Comparison to previous ECG: Left axis deviation  Nonspecific ST and T wave abnormality  Rhythm: sinus rhythm  BPM: 83  QRS axis: left  Other findings: non-specific ST-T  wave changes    Clinical impression: abnormal EKG            Advance Care Planning   ACP discussion was held with the patient during this visit. Patient does not have an advance directive, information provided.           Assessment:   Diagnoses and all orders for this visit:    1. Aortic valve disorder (Primary)    2. Essential hypertension    3. Mixed hyperlipidemia        Impression:  1. Aortic valve disorder (Primary).  Status post TAVR.  Mild regurgitation.  Stable and asymptomatic. Continue on aspirin 81 mg for antiplatelet therapy.     2. Essential hypertension. Elevated today.  But all previous readings, at home readings are normal.    3. Mixed hyperlipidemia. Well controlled. Continue on rosuvastatin 40 mg daily for hyperlipidemia.     4.  Coronary disease, mild nonflow-limiting asymptomatic without angina.    Plan:  Stable cardiac status.   Continue current medications.  Revisit in 12 MO with echo, or sooner as needed.          Scribed for Jaylan Redd MD by Catrachito Kasper. 6/10/2024 10:20 EDT  Jaylan Redd MD    Please note that portions of this note may have been completed with a voice recognition program. Efforts were made to edit the dictations, but occasionally words are mistranscribed.

## 2024-06-10 NOTE — TELEPHONE ENCOUNTER
Rx Refill Note  Requested Prescriptions     Pending Prescriptions Disp Refills    NIFEdipine CC (ADALAT CC) 60 MG 24 hr tablet [Pharmacy Med Name: NIFEdipine ER 60 MG TABLET] 30 tablet 0     Sig: TAKE 1 TABLET BY MOUTH DAILY      Last office visit with prescribing clinician: 2/22/2024   Last telemedicine visit with prescribing clinician: Visit date not found   Next office visit with prescribing clinician: 6/9/2024       Nicolle Sharma MA  06/10/24, 10:27 EDT

## 2024-06-13 ENCOUNTER — TELEPHONE (OUTPATIENT)
Age: 69
End: 2024-06-13
Payer: MEDICARE

## 2024-06-13 DIAGNOSIS — D84.9 IMMUNOSUPPRESSION: ICD-10-CM

## 2024-06-13 DIAGNOSIS — M11.20 CALCIUM PYROPHOSPHATE DEPOSITION DISEASE: Primary | ICD-10-CM

## 2024-06-13 DIAGNOSIS — Z79.1 NSAID LONG-TERM USE: ICD-10-CM

## 2024-06-13 DIAGNOSIS — Z79.899 HIGH RISK MEDICATION USE: ICD-10-CM

## 2024-06-13 NOTE — TELEPHONE ENCOUNTER
PT CALLED IN. NEEDS RECURRING LAB ORDERS UPLOADED INTO [facility] SO SHE CAN GET THEM DONE AT A [facility].

## 2024-06-21 ENCOUNTER — LAB (OUTPATIENT)
Dept: LAB | Facility: HOSPITAL | Age: 69
End: 2024-06-21
Payer: MEDICARE

## 2024-06-21 DIAGNOSIS — Z79.1 NSAID LONG-TERM USE: ICD-10-CM

## 2024-06-21 DIAGNOSIS — Z79.899 HIGH RISK MEDICATION USE: ICD-10-CM

## 2024-06-21 DIAGNOSIS — D84.9 IMMUNOSUPPRESSION: ICD-10-CM

## 2024-06-21 DIAGNOSIS — M11.20 CALCIUM PYROPHOSPHATE DEPOSITION DISEASE: ICD-10-CM

## 2024-06-21 LAB
ALBUMIN SERPL-MCNC: 4.4 G/DL (ref 3.5–5.2)
ALBUMIN/GLOB SERPL: 1.8 G/DL
ALP SERPL-CCNC: 81 U/L (ref 39–117)
ALT SERPL W P-5'-P-CCNC: 29 U/L (ref 1–33)
ANION GAP SERPL CALCULATED.3IONS-SCNC: 7 MMOL/L (ref 5–15)
AST SERPL-CCNC: 40 U/L (ref 1–32)
BASOPHILS # BLD AUTO: 0.04 10*3/MM3 (ref 0–0.2)
BASOPHILS NFR BLD AUTO: 0.9 % (ref 0–1.5)
BILIRUB SERPL-MCNC: 0.7 MG/DL (ref 0–1.2)
BUN SERPL-MCNC: 12 MG/DL (ref 8–23)
BUN/CREAT SERPL: 17.1 (ref 7–25)
CALCIUM SPEC-SCNC: 9.4 MG/DL (ref 8.6–10.5)
CHLORIDE SERPL-SCNC: 105 MMOL/L (ref 98–107)
CO2 SERPL-SCNC: 27 MMOL/L (ref 22–29)
CREAT SERPL-MCNC: 0.7 MG/DL (ref 0.57–1)
CRP SERPL-MCNC: <0.3 MG/DL (ref 0–0.5)
DEPRECATED RDW RBC AUTO: 46.5 FL (ref 37–54)
EGFRCR SERPLBLD CKD-EPI 2021: 94.3 ML/MIN/1.73
EOSINOPHIL # BLD AUTO: 0.1 10*3/MM3 (ref 0–0.4)
EOSINOPHIL NFR BLD AUTO: 2.3 % (ref 0.3–6.2)
ERYTHROCYTE [DISTWIDTH] IN BLOOD BY AUTOMATED COUNT: 13.1 % (ref 12.3–15.4)
ERYTHROCYTE [SEDIMENTATION RATE] IN BLOOD: 7 MM/HR (ref 0–30)
GLOBULIN UR ELPH-MCNC: 2.4 GM/DL
GLUCOSE SERPL-MCNC: 85 MG/DL (ref 65–99)
HCT VFR BLD AUTO: 39 % (ref 34–46.6)
HGB BLD-MCNC: 13 G/DL (ref 12–15.9)
IMM GRANULOCYTES # BLD AUTO: 0 10*3/MM3 (ref 0–0.05)
IMM GRANULOCYTES NFR BLD AUTO: 0 % (ref 0–0.5)
LYMPHOCYTES # BLD AUTO: 1.69 10*3/MM3 (ref 0.7–3.1)
LYMPHOCYTES NFR BLD AUTO: 38.5 % (ref 19.6–45.3)
MCH RBC QN AUTO: 32.3 PG (ref 26.6–33)
MCHC RBC AUTO-ENTMCNC: 33.3 G/DL (ref 31.5–35.7)
MCV RBC AUTO: 96.8 FL (ref 79–97)
MONOCYTES # BLD AUTO: 0.39 10*3/MM3 (ref 0.1–0.9)
MONOCYTES NFR BLD AUTO: 8.9 % (ref 5–12)
NEUTROPHILS NFR BLD AUTO: 2.17 10*3/MM3 (ref 1.7–7)
NEUTROPHILS NFR BLD AUTO: 49.4 % (ref 42.7–76)
NRBC BLD AUTO-RTO: 0 /100 WBC (ref 0–0.2)
PLATELET # BLD AUTO: 172 10*3/MM3 (ref 140–450)
PMV BLD AUTO: 10.4 FL (ref 6–12)
POTASSIUM SERPL-SCNC: 4.6 MMOL/L (ref 3.5–5.2)
PROT SERPL-MCNC: 6.8 G/DL (ref 6–8.5)
RBC # BLD AUTO: 4.03 10*6/MM3 (ref 3.77–5.28)
SODIUM SERPL-SCNC: 139 MMOL/L (ref 136–145)
WBC NRBC COR # BLD AUTO: 4.39 10*3/MM3 (ref 3.4–10.8)

## 2024-06-21 PROCEDURE — 85025 COMPLETE CBC W/AUTO DIFF WBC: CPT

## 2024-06-21 PROCEDURE — 85652 RBC SED RATE AUTOMATED: CPT

## 2024-06-21 PROCEDURE — 86140 C-REACTIVE PROTEIN: CPT

## 2024-06-21 PROCEDURE — 36415 COLL VENOUS BLD VENIPUNCTURE: CPT

## 2024-06-21 PROCEDURE — 80053 COMPREHEN METABOLIC PANEL: CPT

## 2024-06-24 ENCOUNTER — TELEPHONE (OUTPATIENT)
Dept: CARDIOLOGY | Facility: HOSPITAL | Age: 69
End: 2024-06-24
Payer: MEDICARE

## 2024-06-25 ENCOUNTER — TELEPHONE (OUTPATIENT)
Dept: CARDIOLOGY | Facility: HOSPITAL | Age: 69
End: 2024-06-25
Payer: MEDICARE

## 2024-06-25 NOTE — TELEPHONE ENCOUNTER
Called patient and notified her that per LIBBY Ramirez, she is a low CV risk for upcoming colonoscopy. Advised patient that she may hold ASA if Dr. Bowling requests it. Verbalized understanding.

## 2024-06-25 NOTE — TELEPHONE ENCOUNTER
Returned call of patient. She is requesting a cardiac clearance for a scheduled colonoscopy on 7/8/2024 with Dr. Steven Bowling. Aspirin 81 mg is the only antiplatelet therapy.     Patient was last seen on 6/10/2024 for:  1. Aortic valve disorder (Primary).  Status post TAVR.  Mild regurgitation.   2. Essential hypertension.    3. Mixed hyperlipidemia.    4.  Coronary disease, mild nonflow-limiting asymptomatic without angina.    If ok for procedure, will fax CC to # [phone]. Please advise.

## 2024-07-01 RX ORDER — SODIUM PICOSULFATE, MAGNESIUM OXIDE, AND ANHYDROUS CITRIC ACID 10; 3.5; 12 MG/160ML; G/160ML; G/160ML
350 LIQUID ORAL TAKE AS DIRECTED
Qty: 350 ML | Refills: 0 | Status: SHIPPED | OUTPATIENT
Start: 2024-07-01

## 2024-07-08 ENCOUNTER — OUTSIDE FACILITY SERVICE (OUTPATIENT)
Dept: GASTROENTEROLOGY | Facility: CLINIC | Age: 69
End: 2024-07-08
Payer: MEDICARE

## 2024-07-08 DIAGNOSIS — I10 ESSENTIAL HYPERTENSION: ICD-10-CM

## 2024-07-08 DIAGNOSIS — E78.2 MIXED HYPERLIPIDEMIA: ICD-10-CM

## 2024-07-08 PROCEDURE — 88305 TISSUE EXAM BY PATHOLOGIST: CPT | Performed by: INTERNAL MEDICINE

## 2024-07-08 PROCEDURE — 45385 COLONOSCOPY W/LESION REMOVAL: CPT | Performed by: INTERNAL MEDICINE

## 2024-07-08 PROCEDURE — 45380 COLONOSCOPY AND BIOPSY: CPT | Performed by: INTERNAL MEDICINE

## 2024-07-08 RX ORDER — ROSUVASTATIN CALCIUM 40 MG/1
TABLET, COATED ORAL
Qty: 90 TABLET | Refills: 0 | Status: SHIPPED | OUTPATIENT
Start: 2024-07-08

## 2024-07-08 NOTE — TELEPHONE ENCOUNTER
Rx Refill Note  Requested Prescriptions     Pending Prescriptions Disp Refills    rosuvastatin (CRESTOR) 40 MG tablet [Pharmacy Med Name: ROSUVASTATIN CALCIUM 40 MG TAB] 90 tablet 0     Sig: TAKE 1 TABLET BY MOUTH DAILY      Last office visit with prescribing clinician: 2/22/2024   Last telemedicine visit with prescribing clinician: Visit date not found   Next office visit with prescribing clinician: 7/8/2024       Cassia Varghese MA  07/08/24, 10:24 EDT

## 2024-07-08 NOTE — TELEPHONE ENCOUNTER
Rx Refill Note  Requested Prescriptions     Pending Prescriptions Disp Refills    NIFEdipine CC (ADALAT CC) 60 MG 24 hr tablet [Pharmacy Med Name: NIFEdipine ER 60 MG TABLET] 30 tablet 0     Sig: TAKE 1 TABLET BY MOUTH DAILY      Last office visit with prescribing clinician: 2/22/2024   Last telemedicine visit with prescribing clinician: Visit date not found   Next office visit with prescribing clinician: 8/26/2024       Cassia Varghese MA  07/08/24, 09:28 EDT

## 2024-07-09 ENCOUNTER — LAB REQUISITION (OUTPATIENT)
Dept: LAB | Facility: HOSPITAL | Age: 69
End: 2024-07-09
Payer: MEDICARE

## 2024-07-09 DIAGNOSIS — D12.5 BENIGN NEOPLASM OF SIGMOID COLON: ICD-10-CM

## 2024-07-09 DIAGNOSIS — Z12.11 ENCOUNTER FOR SCREENING FOR MALIGNANT NEOPLASM OF COLON: ICD-10-CM

## 2024-07-09 DIAGNOSIS — D12.8 BENIGN NEOPLASM OF RECTUM: ICD-10-CM

## 2024-07-09 DIAGNOSIS — K64.8 OTHER HEMORRHOIDS: ICD-10-CM

## 2024-07-10 ENCOUNTER — SPECIALTY PHARMACY (OUTPATIENT)
Age: 69
End: 2024-07-10
Payer: MEDICARE

## 2024-07-10 ENCOUNTER — OFFICE VISIT (OUTPATIENT)
Age: 69
End: 2024-07-10
Payer: MEDICARE

## 2024-07-10 VITALS
HEIGHT: 58 IN | WEIGHT: 131.2 LBS | HEART RATE: 76 BPM | BODY MASS INDEX: 27.54 KG/M2 | SYSTOLIC BLOOD PRESSURE: 146 MMHG | TEMPERATURE: 97.6 F | DIASTOLIC BLOOD PRESSURE: 78 MMHG

## 2024-07-10 DIAGNOSIS — M15.9 GENERALIZED OSTEOARTHROSIS, INVOLVING MULTIPLE SITES: ICD-10-CM

## 2024-07-10 DIAGNOSIS — Z95.2 STATUS POST AORTIC VALVE REPLACEMENT: ICD-10-CM

## 2024-07-10 DIAGNOSIS — Z79.899 HIGH RISK MEDICATION USE: ICD-10-CM

## 2024-07-10 DIAGNOSIS — Z79.1 NSAID LONG-TERM USE: ICD-10-CM

## 2024-07-10 DIAGNOSIS — M11.20 CALCIUM PYROPHOSPHATE DEPOSITION DISEASE (CPPD): Primary | ICD-10-CM

## 2024-07-10 DIAGNOSIS — D84.821 IMMUNOSUPPRESSION DUE TO DRUG THERAPY: ICD-10-CM

## 2024-07-10 DIAGNOSIS — Z79.899 IMMUNOSUPPRESSION DUE TO DRUG THERAPY: ICD-10-CM

## 2024-07-10 LAB — REF LAB TEST METHOD: NORMAL

## 2024-07-10 PROCEDURE — 1160F RVW MEDS BY RX/DR IN RCRD: CPT | Performed by: INTERNAL MEDICINE

## 2024-07-10 PROCEDURE — 3077F SYST BP >= 140 MM HG: CPT | Performed by: INTERNAL MEDICINE

## 2024-07-10 PROCEDURE — G2211 COMPLEX E/M VISIT ADD ON: HCPCS | Performed by: INTERNAL MEDICINE

## 2024-07-10 PROCEDURE — 1159F MED LIST DOCD IN RCRD: CPT | Performed by: INTERNAL MEDICINE

## 2024-07-10 PROCEDURE — 99214 OFFICE O/P EST MOD 30 MIN: CPT | Performed by: INTERNAL MEDICINE

## 2024-07-10 PROCEDURE — 3078F DIAST BP <80 MM HG: CPT | Performed by: INTERNAL MEDICINE

## 2024-07-10 RX ORDER — ADALIMUMAB 40MG/0.4ML
KIT SUBCUTANEOUS
Qty: 2 EACH | Refills: 5 | Status: SHIPPED | OUTPATIENT
Start: 2024-07-10

## 2024-07-10 NOTE — ASSESSMENT & PLAN NOTE
-prn NSAID  Risks of NSAIDs discussed including GI upset, GI bleeding, renal and hepatic risks and the risks of cardiovascular disease and stroke. Warned patient not to take with other NSAIDs including OTC NSAIDs

## 2024-07-10 NOTE — ASSESSMENT & PLAN NOTE
Part-time .  .  Lives with son/daughter-in-law   Sudden onset right wrist pain swelling 7/27/23  Consult from [facility] Dr. Mendez 8/10/23  X-ray right wrist 7/27/23:  Advanced degenerative joint disease, +chondrocalcinosis, no fracture, soft tissue swelling present  Labs 8/4/23:  Elevated CRP 4.14, elevated ESR 68, negative WEN, negative RF,  normal LFTs, normal creatinine, magnesium 2.2, calcium 10.1, uric acid 2.0  Tried:  Occupational therapy, Naproxen, celecoxib, prednisone 8/1/23, prednisone taper starting 40 mg 8/17/23, colchicine  **Current:  diclofenac gel, celecoxib, Humira 4/24(abbvie assist)  Prior methotrexate 8/31/23-4/24 (LFTs).    Sudden onset inflammatory monoarthritis right wrist 7/27/23.    + chondrocalcinosis right wrist on x-ray along with degenerative changes  Elevated ESR/CRP on labs.  Negative WEN.  Negative rheumatoid factor  No history of injury/wrist trauma.  No history of gout.  No fever, night sweats    Suspect pseudogout/CPPD right wrist 7/27/23 with evidence chondrocalcinosis supportive of pseudogout diagnosis on wrist x-ray as above.   Seronegative rheumatoid also a consideration in the differential.   Normal uric acid  No fracture.  Osteoarthritis noted on x-ray  Less likely gout with wrist involvement and no history of gout, normal uric acid.    Unlikely septic arthritis.  No clinical evidence of psoriatic arthritis, lupus or connective tissue disease  WEN negative      Overall right wrist is much improved   Low disease activity on Humira.  No swelling or tenderness to the wrist or hand or any peripheral joint today.    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  Previously considered surgery with [facility] on the right wrist, but they have told her surgery while surgery would possibly help wrist pain , it would not improve her range of motion in the wrist.    -Continue Humira every 2 weeks  Recommend diclofenac gel  as needed  Recommend continue celecoxib as needed and hand therapy  -Labs reviewed from 6/24 CBC CMP are stable.  Continue lab monitoring every 4 to 6 months  Update Q TB yearly  Return to clinic 4 months

## 2024-07-10 NOTE — PROGRESS NOTES
Office Follow Up      Date: 07/10/2024   Patient Name: Greta Marino  MRN: [id number]  YOB: 1955    Referring Physician: Alia Vasquez AP*     Chief Complaint: CPPD, OA, right wrist pain and swelling      History of Present Illness: Greta Marino is a 69 y.o. female who is here today for follow up on right wrist swelling and pain, CPPD and Osteoarthritis    HPI Details:  Initial consult 8/17/23:  68-year-old  female with history of generalized osteoarthritis, aortic valve replacement 7/22 seen today in consultation from [facility] Dr. Mendez for new sudden onset pain, redness, warmth, swelling right wrist starting 7/27/23.  She denies any injury or fall.  Her right wrist just started aching and swelling for no reason one evening in late July.  She has never had anything like this before.  No history of gout.  No fevers.  No other joints seem to be involved beyond the right wrist and dorsum of the right hand.  It is hard to use her right hand presently.  No troubles with the left hand beyond her chronic osteoarthritis.    X-ray right wrist 7/27/23 reportedly showed no fracture, advanced degenerative joint disease, positive chondrocalcinosis, soft tissue swelling wrist.  Labs have shown negative WEN, negative rheumatoid factor, elevated inflammatory markers CRP/sed rate    There is suspicion for pseudogout right wrist.  She was treated with prednisone taper by her PCP starting 8/1/23.  This did not seem to help much.  She was then treated with naproxen followed by celecoxib which she currently is taking b.i.d..  She is currently doing occupational therapy.  She consulted with [facility] Dr. Mendez 8/10/23 who referred her on to our Rheumatology Clinic due to concern for inflammatory arthritis.    She has no history of psoriasis, rheumatoid arthritis, lupus or systemic inflammatory rheumatic disease  Denies Raynaud's, malar rash, oral  nasal ulcers, pleurisy/pericarditis, renal/hematologic abnormalities, clotting disorder, seizure disorder, iritis.    Follow-up 8/31/23:  There is some slight improvement in the right wrist after steroid taper.  She continues on colchicine once daily.  She is not taking Celebrex often.  Swelling is somewhat down.  Still quite swollen and hard to bend.  Doing hand therapy.  No other joints involved.  No fevers night sweats weight loss    Follow-up 9/26/23:  She reports the right wrist is gradually improving.  No pain in the right wrist any longer.  Right wrist is still swollen and cannot make a fist with the right hand.  Continues hand therapy.  No side effects to methotrexate.  Well tolerated and seems to be gradually helping.  Continues celecoxib and diclofenac gel.    Follow-up 11/30/23:  Right wrist continues to gradually improve.  Still some swelling to the right wrist but no pain in the wrist any longer unless she works at therapy.  No side effects to methotrexate.  She thinks it helps her wrist overall.  Just taking Celebrex and diclofenac as needed.  Continues hand therapy    Follow-up 1/12/24:  Not doing well.  Right wrist remains swollen and mildly painful.  She still cannot bend her right hand fingers to make a fist.  She continues hand therapy.  Improvement right wrist pain/swelling on methotrexate seems to have plateaued.  The right wrist is definitely less swollen and less painful than it was when it initially started July 2023. Methotrexate has helped somewhat.  Still decreased function with movement right wrist and right fingers.  She was offered surgery but [facility] on the wrist, but they do not think it would improve her range of motion but could improve pain.  She reports the pain is not as concerning to her as it is relatively mild.  Her main concern is decreased range of motion right wrist and fingers.    Follow-up 4/9/24:  Improved.  Right wrist swelling and pain has resolved on  methotrexate.  She is concerned that LFTs have med mildly elevated the past 2 sets of labs.  She just got approved for Humira assistance program but has not actually received Humira yet.  She wants to proceed with the Humira and consider stopping the methotrexate given LFT elevation.  No motion in the right wrist but no longer swollen or painful    Follow-up 7/10/2024: She has been doing very well.  She started on Humira over 3 months ago and has been doing well.  No side effect.  No serious infection.  No flares or pain in the right wrist.  No swelling.  Remains off methotrexate.  LFTs have improved.  She just had a colonoscopy with Dr. Bowling recently      Subjective       Review of Systems: Review of Systems   Constitutional:  Negative for chills, fatigue, fever and unexpected weight loss.   HENT:  Negative for mouth sores, sinus pressure and sore throat.         Dry mouth  Nose sores   Eyes:  Negative for pain and redness.        Dry eyes   Respiratory:  Negative for cough and shortness of breath.    Cardiovascular:  Negative for chest pain.   Gastrointestinal:  Negative for abdominal pain, blood in stool, diarrhea, nausea, vomiting and GERD.   Endocrine: Negative for polydipsia and polyuria.   Genitourinary:  Negative for dysuria, genital sores and hematuria.   Musculoskeletal:  Negative for arthralgias, back pain, joint swelling, myalgias, neck pain and neck stiffness.   Skin:  Negative for rash and bruise.        Psoriasis  Photosensitvity  Malar rash   Allergic/Immunologic: Negative for immunocompromised state.   Neurological:  Negative for seizures, weakness, numbness and memory problem.   Hematological:  Negative for adenopathy. Does not bruise/bleed easily.   Psychiatric/Behavioral:  Negative for depressed mood. The patient is not nervous/anxious.         Medications:   Current Outpatient Medications:     Adalimumab (Humira, 2 Pen,) 40 MG/0.4ML Pen-injector Kit, Inject  under the skin into the appropriate  "area as directed Every 14 (Fourteen) Days. AS DIRECTED, Disp: 2 each, Rfl: 5    alendronate (FOSAMAX) 10 MG tablet, TAKE ONE TABLET BY MOUTH EVERY MORNING BEFORE BREAKFAST, Disp: 90 tablet, Rfl: 1    aspirin 81 MG EC tablet, Take 1 tablet by mouth Daily., Disp: 100 tablet, Rfl: 3    benazepril (LOTENSIN) 40 MG tablet, TAKE 1 TABLET BY MOUTH DAILY, Disp: 90 tablet, Rfl: 1    Cholecalciferol (Vitamin D3) 10 MCG (400 UNIT) capsule, Take 4,000 Units by mouth Daily., Disp: , Rfl:     Diclofenac Sodium (VOLTAREN) 1 % gel gel, Apply 4 g topically to the appropriate area as directed As Needed., Disp: , Rfl:     NIFEdipine CC (ADALAT CC) 60 MG 24 hr tablet, TAKE 1 TABLET BY MOUTH DAILY, Disp: 30 tablet, Rfl: 0    rosuvastatin (CRESTOR) 40 MG tablet, TAKE 1 TABLET BY MOUTH DAILY, Disp: 90 tablet, Rfl: 0    vitamin B-12 (CYANOCOBALAMIN) 1000 MCG tablet, Take 1 tablet by mouth Daily., Disp: , Rfl:     Allergies:   Allergies   Allergen Reactions    Peanut-Containing Drug Products Hives, Shortness Of Breath and Swelling       I have reviewed and updated the patient's chief complaint, history of present illness, review of systems, past medical history, surgical history, family history, social history, medications and allergy list as appropriate.     Objective        Vital Signs:   Vitals:    07/10/24 1455   BP: 146/78   BP Location: Left arm   Patient Position: Sitting   Cuff Size: Adult   Pulse: 76   Temp: 97.6 °F (36.4 °C)   Weight: 59.5 kg (131 lb 3.2 oz)   Height: 147.3 cm (58\")   PainSc:   2     Body mass index is 27.42 kg/m².      Physical Exam:  Physical Exam   MUSCULOSKELETAL:   No peripheral synovitis.  No tender joints  Right wrist with no swelling erythema or pain; markedly decreased range of motion right wrist.    No synovitis hands or left wrist.  No ulcer.  No drainage.  No rash  No rheumatoid nodules or tophi.  Heberden and Ector's nodes present throughout the finger joints    Complete joint exam was performed " "including the MCPs, PIPs, DIPs of the hands, wrists, elbows, shoulders, hips, knees and ankles.  No soft tissue swelling or tenderness is present except as above.    General: The patient is well-developed and well nourished. Cooperative, alert and oriented. Affect is normal. Hydration appears normal.   HEENT: Normocephalic and atraumatic. No notable alopecia. Lids and conjunctiva are normal. Pupils are equal and sclera are clear. Oropharynx is clear   NECK neck is supple without adenopathy, masses or thyromegaly.   CARDIOVASCULAR: Regular rate and rhythm.   LUNGS: Effort is normal. Lungs are clear bilateral   ABDOMEN: Not examined  EXTREMITIES: Peripheral pulses are intact. No clubbing.   SKIN: No rashes. No subcutaneous nodules. No digital ulcers. No sclerodactyly.   NEUROLOGIC: Gait is normal. Strength testing is normal.  No focal neurologic deficits    Results Review:   Labs:   Lab Results   Component Value Date    GLUCOSE 85 06/21/2024    BUN 12 06/21/2024    CREATININE 0.70 06/21/2024    EGFR 94.3 06/21/2024    BCR 17.1 06/21/2024    K 4.6 06/21/2024    CO2 27.0 06/21/2024    CALCIUM 9.4 06/21/2024    PROTENTOTREF 6.6 03/24/2021    ALBUMIN 4.4 06/21/2024    BILITOT 0.7 06/21/2024    AST 40 (H) 06/21/2024    ALT 29 06/21/2024     Lab Results   Component Value Date    WBC 4.39 06/21/2024    HGB 13.0 06/21/2024    HCT 39.0 06/21/2024    MCV 96.8 06/21/2024     06/21/2024     Lab Results   Component Value Date    SEDRATE 7 06/21/2024     Lab Results   Component Value Date    CRP <0.30 06/21/2024     No results found for: \"QUANTIFERO\", \"QUANTITB1\", \"QUANTITB2\", \"QUANTIFERN\", \"QUANTIFERM\", \"QUANTITBGLDP\"  No results found for: \"RF\"  Lab Results   Component Value Date    HEPBSAG Non-Reactive 03/01/2024    HEPAIGM Non-Reactive 03/01/2024    HEPBIGMCORE Non-Reactive 03/01/2024    HEPCVIRUSABY Non-Reactive 03/01/2024         Procedures    Assessment / Plan        Assessment & Plan  Calcium pyrophosphate deposition " disease (CPPD)  Part-time .  .  Lives with son/daughter-in-law   Sudden onset right wrist pain swelling 7/27/23  Consult from [facility] Dr. Mendez 8/10/23  X-ray right wrist 7/27/23:  Advanced degenerative joint disease, +chondrocalcinosis, no fracture, soft tissue swelling present  Labs 8/4/23:  Elevated CRP 4.14, elevated ESR 68, negative WEN, negative RF,  normal LFTs, normal creatinine, magnesium 2.2, calcium 10.1, uric acid 2.0  Tried:  Occupational therapy, Naproxen, celecoxib, prednisone 8/1/23, prednisone taper starting 40 mg 8/17/23, colchicine  **Current:  diclofenac gel, celecoxib, Humira 4/24([employer] assist)  Prior methotrexate 8/31/23-4/24 (LFTs).    Sudden onset inflammatory monoarthritis right wrist 7/27/23.    + chondrocalcinosis right wrist on x-ray along with degenerative changes  Elevated ESR/CRP on labs.  Negative WEN.  Negative rheumatoid factor  No history of injury/wrist trauma.  No history of gout.  No fever, night sweats    Suspect pseudogout/CPPD right wrist 7/27/23 with evidence chondrocalcinosis supportive of pseudogout diagnosis on wrist x-ray as above.   Seronegative rheumatoid also a consideration in the differential.   Normal uric acid  No fracture.  Osteoarthritis noted on x-ray  Less likely gout with wrist involvement and no history of gout, normal uric acid.    Unlikely septic arthritis.  No clinical evidence of psoriatic arthritis, lupus or connective tissue disease  WEN negative      Overall right wrist is much improved   Low disease activity on Humira.  No swelling or tenderness to the wrist or hand or any peripheral joint today.    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  Previously considered surgery with [facility] on the right wrist, but they have told her surgery while surgery would possibly help wrist pain , it would not improve her range of motion in the wrist.    -Continue Humira every 2 weeks  Recommend  diclofenac gel as needed  Recommend continue celecoxib as needed and hand therapy  -Labs reviewed from 6/24 CBC CMP are stable.  Continue lab monitoring every 4 to 6 months  Update Q TB yearly  Return to clinic 4 months  High risk medication use  Humira  Q TB - 8/31/2023  Hepatitis panel - 8/31/2023    Well-tolerated and effective  We discussed biologic agents at length. Risks and alternatives were discussed at length and the option of no treatment was also given. We discussed risks including but not limited to infections which can be unusual, severe, and deadly. When possible, these agents should be stopped immediately if infections occur. Unusual infection such as TB and fungal infections can occur. There may be an increased risk of lymphoma with these agents. Other risks can include a multiple sclerosis-like illness and worsening of heart failure. Infusion or injection reactions which can be deadly have been reported. Studies on pregnancy have not been done so pregnancy should be avoided while on these agents. Reactivation of a deadly brain virus and hepatitis viruses have been reported. Worsening of COPD has been seen with orencia. Elevated lipids, elevation in liver functions, and dangerous changes in blood counts have been seen with certain agents. Regular monitoring will be required.  Immunosuppression due to drug therapy    Generalized osteoarthrosis, involving multiple sites  -prn NSAID  Risks of NSAIDs discussed including GI upset, GI bleeding, renal and hepatic risks and the risks of cardiovascular disease and stroke. Warned patient not to take with other NSAIDs including OTC NSAIDs  NSAID long-term use    Status post aortic valve replacement  7/22 with Dr. Jaylan Mckay     New Medications Ordered This Visit   Medications    Adalimumab (Humira, 2 Pen,) 40 MG/0.4ML Pen-injector Kit     Sig: Inject  under the skin into the appropriate area as directed Every 14 (Fourteen) Days. AS DIRECTED     Dispense:  2  each     Refill:  5           Follow Up:   Return in about 4 months (around 11/10/2024).        Cleveland Felton MD  [facility]

## 2024-07-10 NOTE — PATIENT INSTRUCTIONS
Adalimumab Injection    What is this medication?  ADALIMUMAB (ay da HUGHES francheska mab) treats autoimmune conditions, such as psoriasis, arthritis, Crohn's disease, and ulcerative colitis. It works by slowing down an overactive immune system. It belongs to a group of medications called TNF inhibitors. It is a monoclonal antibody.    This medicine may be used for other purposes; ask your health care provider or pharmacist if you have questions.  COMMON BRAND NAME(S): ABRILADA, AMJEVITA, CYLTEZO, HADLIMA, Hulio, Hulio PEN, Humira, Hyrimoz, Idacio, Yuflyma, YUSIMRY    What should I tell my care team before I take this medication?  They need to know if you have any of these conditions:  Cancer  Diabetes (high blood sugar)  Having surgery  Heart disease  Hepatitis B  Immune system problems  Infections, such as tuberculosis (TB) or other bacterial, fungal, or viral infections  Multiple sclerosis  Recent or upcoming vaccine  An unusual or allergic reaction to adalimumab, mannitol, latex, rubber, other medications, foods, dyes, or preservatives  Pregnant or trying to get pregnant  Breast-feeding    How should I use this medication?  This medication is injected under the skin. It may be given by your care team in a hospital or clinic setting. It may also be given at home.  If you get this medication at home, you will be taught how to prepare and give it. Use exactly as directed. Take it as directed on the prescription label. Keep taking it unless your care team tells you to stop.    This medication comes with INSTRUCTIONS FOR USE. Ask your pharmacist for directions on how to use this medication. Read the information carefully. Talk to your pharmacist or care team if you have questions.    It is important that you put your used needles and syringes in a special sharps container. Do not put them in a trash can. If you do not have a sharps container, call your pharmacist or care team to get one.    A special MedGuide will be given to  you by the pharmacist with each prescription and refill. If you are getting this medication in a hospital or clinic, a special MedGuide will be given to you before each treatment. Be sure to read this information carefully each time.    Talk to your care team about the use of this medication in children. While it be prescribed for children as young as 2 years for selected conditions, precautions do apply.    Overdosage: If you think you have taken too much of this medicine contact a poison control center or emergency room at once.  NOTE: This medicine is only for you. Do not share this medicine with others.    What if I miss a dose?  If you get this medication at the hospital or clinic: it is important not to miss your dose. Call your care team if you are unable to keep an appointment.  If you give yourself this medication at home: If you miss a dose, take it as soon as you can. If it is almost time for your next dose, take only that dose. Do not take double or extra doses. Call your care team with questions.    What may interact with this medication?  Do not take this medication with any of the following:  Abatacept  Anakinra  Biologic medications, such as certolizumab, etanercept, golimumab, infliximab  Live virus vaccines  This medication may also interact with the following:  Cyclosporine  Theophylline  Vaccines  Warfarin  This list may not describe all possible interactions. Give your health care provider a list of all the medicines, herbs, non-prescription drugs, or dietary supplements you use. Also tell them if you smoke, drink alcohol, or use illegal drugs. Some items may interact with your medicine.    What should I watch for while using this medication?  Visit your care team for regular checks on your progress. Tell your care team if your symptoms do not start to get better or if they get worse.    You will be tested for tuberculosis (TB) before you start this medication. If your care team prescribes any  medication for TB, you should start taking the TB medication before starting this medication. Make sure to finish the full course of TB medication.    This medication may increase your risk of getting an infection. Call your care team for advice if you get a fever, chills, sore throat, or other symptoms of a cold or flu. Do not treat yourself. Try to avoid being around people who are sick.    Talk to your care team about your risk of cancer. You may be more at risk for certain types of cancer if you take this medication.    What side effects may I notice from receiving this medication?  Side effects that you should report to your care team as soon as possible:  Allergic reactions--skin rash, itching, hives, swelling of the face, lips, tongue, or throat  Aplastic anemia--unusual weakness or fatigue, dizziness, headache, trouble breathing, increased bleeding or bruising  Body pain, tingling, or numbness  Heart failure--shortness of breath, swelling of the ankles, feet, or hands, sudden weight gain, unusual weakness or fatigue  Infection--fever, chills, cough, sore throat, wounds that don't heal, pain or trouble when passing urine, general feeling of discomfort or being unwell  Lupus-like syndrome--joint pain, swelling, or stiffness, butterfly-shaped rash on the face, rashes that get worse in the sun, fever, unusual weakness or fatigue  Unusual bruising or bleeding    Side effects that usually do not require medical attention (report to your care team if they continue or are bothersome):  Headache  Nausea  Pain, redness, or irritation at injection site  Runny or stuffy nose  Sore throat  Stomach pain    This list may not describe all possible side effects. Call your doctor for medical advice about side effects. You may report side effects to FDA at 6-187-FDA-0887.    Where should I keep my medication?  Keep out of the reach of children and pets.    Store in the refrigerator. Do not freeze. Keep this medication in the  original packaging until you are ready to take it. Protect from light. Get rid of any unused medication after the expiration date.    This medication may be stored at room temperature for up to 14 days. Keep this medication in the original packaging. Protect from light. If it is stored at room temperature, get rid of any unused medication after 14 days or after it expires, whichever is first.    To get rid of medications that are no longer needed or have :  Take the medication to a medication take-back program. Check with your pharmacy or law enforcement to find a location.  If you cannot return the medication, ask your pharmacist or care team how to get rid of this medication safely.    NOTE: This sheet is a summary. It may not cover all possible information. If you have questions about this medicine, talk to your doctor, pharmacist, or health care provider.  ©  [employer]vier/Gold Standard (2023 00:00:00)

## 2024-07-10 NOTE — ASSESSMENT & PLAN NOTE
Humira  Q TB - 8/31/2023  Hepatitis panel - 8/31/2023    Well-tolerated and effective  We discussed biologic agents at length. Risks and alternatives were discussed at length and the option of no treatment was also given. We discussed risks including but not limited to infections which can be unusual, severe, and deadly. When possible, these agents should be stopped immediately if infections occur. Unusual infection such as TB and fungal infections can occur. There may be an increased risk of lymphoma with these agents. Other risks can include a multiple sclerosis-like illness and worsening of heart failure. Infusion or injection reactions which can be deadly have been reported. Studies on pregnancy have not been done so pregnancy should be avoided while on these agents. Reactivation of a deadly brain virus and hepatitis viruses have been reported. Worsening of COPD has been seen with orencia. Elevated lipids, elevation in liver functions, and dangerous changes in blood counts have been seen with certain agents. Regular monitoring will be required.

## 2024-07-12 ENCOUNTER — HOSPITAL ENCOUNTER (OUTPATIENT)
Dept: ULTRASOUND IMAGING | Facility: HOSPITAL | Age: 69
Discharge: HOME | End: 2024-07-12
Payer: MEDICARE

## 2024-07-12 DIAGNOSIS — N13.30 HYDRONEPHROSIS, UNSPECIFIED HYDRONEPHROSIS TYPE: ICD-10-CM

## 2024-07-12 PROCEDURE — 76775 US EXAM ABDO BACK WALL LIM: CPT

## 2024-07-22 ENCOUNTER — TELEPHONE (OUTPATIENT)
Dept: UROLOGY | Facility: CLINIC | Age: 69
End: 2024-07-22
Payer: MEDICARE

## 2024-07-23 ENCOUNTER — OFFICE VISIT (OUTPATIENT)
Dept: UROLOGY | Facility: CLINIC | Age: 69
End: 2024-07-23
Payer: MEDICARE

## 2024-07-23 DIAGNOSIS — N13.5 URETEROPELVIC JUNCTION (UPJ) OBSTRUCTION, RIGHT: Primary | ICD-10-CM

## 2024-07-23 PROCEDURE — 1159F MED LIST DOCD IN RCRD: CPT | Performed by: UROLOGY

## 2024-07-23 PROCEDURE — 1160F RVW MEDS BY RX/DR IN RCRD: CPT | Performed by: UROLOGY

## 2024-07-23 PROCEDURE — 99213 OFFICE O/P EST LOW 20 MIN: CPT | Performed by: UROLOGY

## 2024-07-23 NOTE — PROGRESS NOTES
Office Note [facility]      Patient Name: Greta Marino  : 1955   MRN: [id number]     Chief Complaint: History of UPJO      History of Present Illness: Greta Marino is a 69 y.o. female who presents today for 1 year follow-up, patient has a history of ureteropelvic junction obstruction status post pyeloplasty.  She has done very well in the postsurgical setting.  She presents today for 1 year follow-up with ultrasound which reveals very minimal right-sided hydronephrosis, chronic in nature, no additional changes.  She is currently asymptomatic and doing very well.        Subjective      Review of System: Review of Systems   Genitourinary:  Negative for decreased urine volume, difficulty urinating, dysuria, enuresis, flank pain, frequency, hematuria and urgency.        I have reviewed the ROS documented by my clinical staff, updated as appropriate and I agree. Shahid Lopez MD    Past Medical History:   Past Medical History:   Diagnosis Date    Allergic     As a child    Arthritis     Arthritis of back     Asthma     Colon polyp     Condition not found     CPPD RIGHT WRIST    Coronary artery disease     Elevated lipids     Fracture     CLOSED FRACTURE OF LEFT ANKLE    Fracture of ankle 1998    Heart murmur     Heart valve disease     aortic valve replaced -     History of mammography, screening 2017    Done in [place]     History of Papanicolaou smear of cervix 2017    Done in [place]    History of shingles 2015    History of transcatheter aortic valve replacement (TAVR) 2022    Hyperlipidemia     Hypertension     Kidney stone     Kidney stones     Knee swelling 2018    Non-functioning kidney     RIGHT    Osteoarthritis     Osteopenia     Pancreatic cyst     Pneumonia 1985    Tear of meniscus of knee 2018    Wears glasses     Wrist pain     R    Wrist sprain        Past Surgical History:   Past Surgical History:   Procedure Laterality Date    ANKLE OPEN REDUCTION  INTERNAL FIXATION  1998    ANKLE SURGERY Left 1980's    ORIF    AORTIC VALVE REPAIR/REPLACEMENT N/A 07/28/2022    Procedure: TRANSCATHETER AORTIC VALVE REPLACEMENT;  Surgeon: Jaylan Mckay MD;  Location:  [facility];  Service: Cardiothoracic;  Laterality: N/A;  flouro 150  dose 16ML  contrast 102 MgY    AORTIC VALVE REPAIR/REPLACEMENT N/A 07/28/2022    Procedure: Transfemoral Transcatheter Aortic Valve Replacement;  Surgeon: Dorian Camara MD;  Location: [facility];  Service: Cardiovascular;  Laterality: N/A;    BREAST BIOPSY Right 1995    CARDIAC CATHETERIZATION Left 06/30/2022    Procedure: Left Heart Cath;  Surgeon: Jaylan Redd MD;  Location: [facility] CATH INVASIVE LOCATION;  Service: Cardiovascular;  Laterality: Left;  to be scheduled in next 1-2 months    COLONOSCOPY  8/24/2020, 2005    8/24/2020- Dr. Bowling. 2005-Dr. Spence in [place], [place]    CYSTOSCOPY  2022    CYSTOSCOPY W/ URETERAL STENT PLACEMENT Right 11/07/2022    Procedure: CYSTOSCOPY URETERAL CATHETER/STENT INSERTION;  Surgeon: Shahid Lopez MD;  Location:  [facility];  Service: Robotics - DaVinci;  Laterality: Right;    CYSTOSCOPY, URETEROSCOPY, RETROGRADE PYELOGRAM, STONE EXTRACTION, STENT INSERTION Right 09/16/2022    Procedure: URETEROSCOPY, RETROGRADE PYELOGRAM, STENT INSERTION - RIGHT;  Surgeon: Shahid Lopez MD;  Location:  [facility];  Service: Urology;  Laterality: Right;    KIDNEY STONE SURGERY  2022    KNEE SURGERY Right 12/20/2018    meniscus repair    PYELOLITHOTOMY Right 11/07/2022    Procedure: PYELOLITHOTOMY LAPAROSCOPIC WITH DAVINCI ROBOT;  Surgeon: Shahid Lopez MD;  Location:  [facility];  Service: Robotics - DaVinci;  Laterality: Right;    PYELOPLASTY Right 11/07/2022    Procedure: PYELOPLASTY LAPAROSCOPIC WITH DAVINCI ROBOT;  Surgeon: Shahid Lopez MD;  Location:  [facility];  Service: Robotics - DaVinci;  Laterality: Right;    TRANSESOPHAGEAL ECHOCARDIOGRAM (SOURAV)      TRANSESOPHAGEAL ECHOCARDIOGRAM (SOURAV)  N/A 07/28/2022    Procedure: TRANSESOPHAGEAL ECHOCARDIOGRAM;  Surgeon: Jaylan Mckay MD;  Location: [facility];  Service: Cardiothoracic;  Laterality: N/A;       Family History:   Family History   Problem Relation Age of Onset    Pancreatic cancer Mother         Passed away in 2002    Arthritis Mother     Cancer Mother         Pancreatic cancer    Diabetes Father         Type 2    Heart attack Father         passed in 2000    Hypertension Father     Colon cancer Father     Cancer Father         Colon cancer    Sudden death Father     Osteoporosis Sister     Arthritis Sister     Diabetes Brother         Type 2    Hypertension Brother     Hypertension Brother     Stroke Brother     Sarcoidosis Daughter     Breast cancer Neg Hx     Ovarian cancer Neg Hx        Social History:   Social History     Socioeconomic History    Marital status:     Number of children: 2    Highest education level: High school graduate   Tobacco Use    Smoking status: Never     Passive exposure: Never    Smokeless tobacco: Never   Vaping Use    Vaping status: Never Used   Substance and Sexual Activity    Alcohol use: Yes     Comment: 1-2 monthly if that    Drug use: Never    Sexual activity: Not Currently     Partners: Male     Birth control/protection: Spermicide, Birth control pill     Comment:        Medications:     Current Outpatient Medications:     Adalimumab (Humira, 2 Pen,) 40 MG/0.4ML Pen-injector Kit, Inject  under the skin into the appropriate area as directed Every 14 (Fourteen) Days. AS DIRECTED, Disp: 2 each, Rfl: 5    alendronate (FOSAMAX) 10 MG tablet, TAKE ONE TABLET BY MOUTH EVERY MORNING BEFORE BREAKFAST, Disp: 90 tablet, Rfl: 1    aspirin 81 MG EC tablet, Take 1 tablet by mouth Daily., Disp: 100 tablet, Rfl: 3    benazepril (LOTENSIN) 40 MG tablet, TAKE 1 TABLET BY MOUTH DAILY, Disp: 90 tablet, Rfl: 1    Cholecalciferol (Vitamin D3) 10 MCG (400 UNIT) capsule, Take 4,000 Units by mouth Daily.,  Disp: , Rfl:     Diclofenac Sodium (VOLTAREN) 1 % gel gel, Apply 4 g topically to the appropriate area as directed As Needed., Disp: , Rfl:     NIFEdipine CC (ADALAT CC) 60 MG 24 hr tablet, TAKE 1 TABLET BY MOUTH DAILY, Disp: 30 tablet, Rfl: 0    rosuvastatin (CRESTOR) 40 MG tablet, TAKE 1 TABLET BY MOUTH DAILY, Disp: 90 tablet, Rfl: 0    vitamin B-12 (CYANOCOBALAMIN) 1000 MCG tablet, Take 1 tablet by mouth Daily., Disp: , Rfl:     Allergies:   Allergies   Allergen Reactions    Peanut-Containing Drug Products Hives, Shortness Of Breath and Swelling       Objective     Physical Exam:   Vital Signs: There were no vitals filed for this visit.  There is no height or weight on file to calculate BMI.     Physical Exam  Vitals and nursing note reviewed.   Constitutional:       Appearance: Normal appearance.   HENT:      Head: Normocephalic and atraumatic.   Cardiovascular:      Comments: Well perfused  Pulmonary:      Effort: Pulmonary effort is normal.   Abdominal:      General: Abdomen is flat.      Palpations: Abdomen is soft.   Musculoskeletal:         General: Normal range of motion.   Skin:     General: Skin is warm and dry.   Neurological:      General: No focal deficit present.      Mental Status: She is alert and oriented to person, place, and time. Mental status is at baseline.   Psychiatric:         Mood and Affect: Mood normal.         Behavior: Behavior normal.         Thought Content: Thought content normal.         Judgment: Judgment normal.         Labs:   Brief Urine Lab Results       None                 Lab Results   Component Value Date    GLUCOSE 85 06/21/2024    CALCIUM 9.4 06/21/2024     06/21/2024    K 4.6 06/21/2024    CO2 27.0 06/21/2024     06/21/2024    BUN 12 06/21/2024    CREATININE 0.70 06/21/2024    EGFRIFAFRI 106 03/24/2021    EGFRIFNONA 87 03/24/2021    BCR 17.1 06/21/2024    ANIONGAP 7.0 06/21/2024       Lab Results   Component Value Date    WBC 4.39 06/21/2024    HGB 13.0  06/21/2024    HCT 39.0 06/21/2024    MCV 96.8 06/21/2024     06/21/2024         Images:   US Renal Bilateral    Result Date: 7/12/2024  Impression: Mild right hydronephrosis. Further evaluation with a CT abdomen pelvis is advised Electronically Signed: Dilshad Anne MD  7/12/2024 3:21 PM EDT  Workstation ID: [id number]    US Liver    Result Date: 4/17/2024  Impression: Unremarkable sonographic appearance of the hepatobiliary system, outside of small incidental hepatic cysts. Unchanged mild right hydronephrosis, which may be related to chronic UPJ stenosis. Unchanged pancreatic body cystic lesion, as characterized on prior MRI. Electronically Signed: Aaron Doshi MD  4/17/2024 11:30 AM EDT  Workstation ID: [id number]      Measures:   Tobacco:   Greta Marino  reports that she has never smoked. She has never been exposed to tobacco smoke. She has never used smokeless tobacco. I have educated her on the risk of diseases from using tobacco products.     Assessment / Plan      Assessment/Plan:   Greta Marino is a 69 y.o. female who presented today for 1 year follow-up status post right pyeloplasty for management of UPJ.  She is doing well.  Ultrasound stable today.  She is currently asymptomatic.  We will continue to follow once yearly with ultrasound she will alert with any acute change in symptoms.    Diagnoses and all orders for this visit:    1. Ureteropelvic junction (UPJ) obstruction, right (Primary)           Follow Up:   Return in about 1 year (around 7/23/2025) for Recheck.    I spent approximately 20 minutes providing clinical care for this patient; including review of patient's chart and provider documentation, face to face time spent with patient in examination room (obtaining history, performing physical exam, discussing diagnosis and management options), placing orders, and completing patient documentation.     Shahid Lopez MD  [facility]

## 2024-08-08 DIAGNOSIS — I10 ESSENTIAL HYPERTENSION: ICD-10-CM

## 2024-08-08 DIAGNOSIS — E78.2 MIXED HYPERLIPIDEMIA: ICD-10-CM

## 2024-08-08 NOTE — TELEPHONE ENCOUNTER
Rx Refill Note  Requested Prescriptions     Pending Prescriptions Disp Refills    NIFEdipine CC (ADALAT CC) 60 MG 24 hr tablet [Pharmacy Med Name: NIFEdipine ER 60 MG TABLET] 30 tablet 0     Sig: TAKE 1 TABLET BY MOUTH DAILY      Last office visit with prescribing clinician: 2/22/2024     Next office visit with prescribing clinician: 8/26/2024       Cee Winston  08/08/24, 09:24 EDT

## 2024-08-26 ENCOUNTER — OFFICE VISIT (OUTPATIENT)
Dept: INTERNAL MEDICINE | Facility: CLINIC | Age: 69
End: 2024-08-26
Payer: MEDICARE

## 2024-08-26 VITALS
DIASTOLIC BLOOD PRESSURE: 68 MMHG | HEART RATE: 90 BPM | OXYGEN SATURATION: 98 % | BODY MASS INDEX: 27.75 KG/M2 | RESPIRATION RATE: 16 BRPM | TEMPERATURE: 99.3 F | HEIGHT: 58 IN | WEIGHT: 132.2 LBS | SYSTOLIC BLOOD PRESSURE: 124 MMHG

## 2024-08-26 DIAGNOSIS — I35.9 AORTIC VALVE DISORDER: ICD-10-CM

## 2024-08-26 DIAGNOSIS — E55.9 VITAMIN D DEFICIENCY: ICD-10-CM

## 2024-08-26 DIAGNOSIS — L98.9 SKIN LESIONS: ICD-10-CM

## 2024-08-26 DIAGNOSIS — Z00.00 ROUTINE GENERAL MEDICAL EXAMINATION AT A HEALTH CARE FACILITY: ICD-10-CM

## 2024-08-26 DIAGNOSIS — N89.8 VAGINAL LESION: ICD-10-CM

## 2024-08-26 DIAGNOSIS — I10 ESSENTIAL HYPERTENSION: ICD-10-CM

## 2024-08-26 DIAGNOSIS — Z00.00 MEDICARE ANNUAL WELLNESS VISIT, SUBSEQUENT: Primary | ICD-10-CM

## 2024-08-26 DIAGNOSIS — E78.2 MIXED HYPERLIPIDEMIA: ICD-10-CM

## 2024-08-26 PROCEDURE — 3078F DIAST BP <80 MM HG: CPT | Performed by: NURSE PRACTITIONER

## 2024-08-26 PROCEDURE — 99397 PER PM REEVAL EST PAT 65+ YR: CPT | Performed by: NURSE PRACTITIONER

## 2024-08-26 PROCEDURE — 1159F MED LIST DOCD IN RCRD: CPT | Performed by: NURSE PRACTITIONER

## 2024-08-26 PROCEDURE — 3074F SYST BP LT 130 MM HG: CPT | Performed by: NURSE PRACTITIONER

## 2024-08-26 PROCEDURE — 1160F RVW MEDS BY RX/DR IN RCRD: CPT | Performed by: NURSE PRACTITIONER

## 2024-08-26 PROCEDURE — G0439 PPPS, SUBSEQ VISIT: HCPCS | Performed by: NURSE PRACTITIONER

## 2024-08-26 PROCEDURE — 99214 OFFICE O/P EST MOD 30 MIN: CPT | Performed by: NURSE PRACTITIONER

## 2024-08-26 PROCEDURE — 1126F AMNT PAIN NOTED NONE PRSNT: CPT | Performed by: NURSE PRACTITIONER

## 2024-08-26 NOTE — PROGRESS NOTES
Subjective   The ABCs of the Annual Wellness Visit  Medicare Wellness Visit      Greta Marino is a 69 y.o. patient who presents for a Medicare Wellness Visit.    The following portions of the patient's history were reviewed and   updated as appropriate: allergies, current medications, past family history, past medical history, past social history, past surgical history, and problem list.    Compared to one year ago, the patient's physical   health is the same.  Compared to one year ago, the patient's mental   health is the same.    Recent Hospitalizations:  She was not admitted to the hospital during the last year.     Current Medical Providers:  Patient Care Team:  Alia Vasquez APRN as PCP - General (Family Medicine)  Jaylan Mckay MD as Surgeon (Cardiothoracic Surgery)  Jaylan Redd MD as Consulting Physician (Cardiology)  Jaquelin Dominique APRN as Nurse Practitioner (Cardiology)  Steven Bowling MD as Consulting Physician (Gastroenterology)  Shahid Lopez MD as Consulting Physician (Urology)  Hayley Mckeon APRN as Nurse Practitioner (Gastroenterology)  Cleveland Felton MD as Consulting Physician (Rheumatology)    Outpatient Medications Prior to Visit   Medication Sig Dispense Refill    Adalimumab (Humira, 2 Pen,) 40 MG/0.4ML Pen-injector Kit Inject  under the skin into the appropriate area as directed Every 14 (Fourteen) Days. AS DIRECTED 2 each 5    alendronate (FOSAMAX) 10 MG tablet TAKE ONE TABLET BY MOUTH EVERY MORNING BEFORE BREAKFAST 90 tablet 1    aspirin 81 MG EC tablet Take 1 tablet by mouth Daily. 100 tablet 3    benazepril (LOTENSIN) 40 MG tablet TAKE 1 TABLET BY MOUTH DAILY 90 tablet 1    Cholecalciferol (Vitamin D3) 10 MCG (400 UNIT) capsule Take 4,000 Units by mouth Daily.      Diclofenac Sodium (VOLTAREN) 1 % gel gel Apply 4 g topically to the appropriate area as directed As Needed.      NIFEdipine CC (ADALAT CC) 60 MG 24 hr tablet TAKE 1 TABLET BY MOUTH DAILY 30  "tablet 0    rosuvastatin (CRESTOR) 40 MG tablet TAKE 1 TABLET BY MOUTH DAILY 90 tablet 0    vitamin B-12 (CYANOCOBALAMIN) 1000 MCG tablet Take 1 tablet by mouth Daily.       No facility-administered medications prior to visit.     No opioid medication identified on active medication list. I have reviewed chart for other potential  high risk medication/s and harmful drug interactions in the elderly.      Aspirin is on active medication list. Aspirin use is indicated based on review of current medical condition/s. Pros and cons of this therapy have been discussed today. Benefits of this medication outweigh potential harm.  Patient has been encouraged to continue taking this medication.  .      Patient Active Problem List   Diagnosis    Essential hypertension    Mixed hyperlipidemia    Wrist arthritis    Aortic valve disorder    Ureteropelvic junction (UPJ) obstruction    Nephrolithiasis    Sleep apnea    Pericardial effusion (S/P Drain)    Pancreatic cyst    Status post aortic valve replacement    Ureteropelvic junction (UPJ) obstruction, right    Calcium pyrophosphate deposition disease (CPPD)    NSAID long-term use    Immunosuppression due to drug therapy    High risk medication use    Generalized osteoarthrosis, involving multiple sites     Advance Care Planning Advance Directive is not on file.  ACP discussion was held with the patient during this visit. Patient does not have an advance directive, information provided.            Objective   Vitals:    08/26/24 0954   BP: 124/68   BP Location: Left arm   Patient Position: Sitting   Cuff Size: Adult   Pulse: 90   Resp: 16   Temp: 99.3 °F (37.4 °C)   TempSrc: Temporal   SpO2: 98%   Weight: 60 kg (132 lb 3.2 oz)   Height: 147.3 cm (57.99\")   PainSc: 0-No pain       Estimated body mass index is 27.64 kg/m² as calculated from the following:    Height as of this encounter: 147.3 cm (57.99\").    Weight as of this encounter: 60 kg (132 lb 3.2 oz).            Does the " patient have evidence of cognitive impairment? No                                                                                                Health  Risk Assessment    Smoking Status:  Social History     Tobacco Use   Smoking Status Never    Passive exposure: Never   Smokeless Tobacco Never     Alcohol Consumption:  Social History     Substance and Sexual Activity   Alcohol Use Yes    Comment: 1-2 monthly if that       Fall Risk Screen  ZIYADADI Fall Risk Assessment was completed, and patient is at LOW risk for falls.Assessment completed on:2024    Depression Screenin/26/2024     9:49 AM   PHQ-2/PHQ-9 Depression Screening   Little Interest or Pleasure in Doing Things 0-->not at all   Feeling Down, Depressed or Hopeless 1-->several days   PHQ-9: Brief Depression Severity Measure Score 1     Health Habits and Functional and Cognitive Screenin/19/2024     9:02 AM   Functional & Cognitive Status   Do you have difficulty preparing food and eating? No   Do you have difficulty bathing yourself, getting dressed or grooming yourself? No   Do you have difficulty using the toilet? No   Do you have difficulty moving around from place to place? No   Do you have trouble with steps or getting out of a bed or a chair? Yes   Current Diet Unhealthy Diet   Dental Exam Not up to date   Eye Exam Not up to date   Exercise (times per week) 0 times per week   Current Exercises Include House Cleaning;Walking   Do you need help using the phone?  No   Are you deaf or do you have serious difficulty hearing?  No   Do you need help to go to places out of walking distance? No   Do you need help shopping? No   Do you need help preparing meals?  No   Do you need help with housework?  No   Do you need help with laundry? No   Do you need help taking your medications? No   Do you need help managing money? No   Do you ever drive or ride in a car without wearing a seat belt? No   Have you felt unusual stress, anger or loneliness  in the last month? No   Who do you live with? Child   If you need help, do you have trouble finding someone available to you? No   Have you been bothered in the last four weeks by sexual problems? No   Do you have difficulty concentrating, remembering or making decisions? No           Age-appropriate Screening Schedule:  Refer to the list below for future screening recommendations based on patient's age, sex and/or medical conditions. Orders for these recommended tests are listed in the plan section. The patient has been provided with a written plan.    Health Maintenance List  Health Maintenance   Topic Date Due    INFLUENZA VACCINE  08/01/2024    MAMMOGRAM  11/08/2024    ZOSTER VACCINE (1 of 2) 08/26/2024 (Originally 4/8/1974)    COVID-19 Vaccine (6 - 2023-24 season) 11/15/2024 (Originally 2/7/2024)    BMI FOLLOWUP  12/19/2024    TDAP/TD VACCINES (2 - Td or Tdap) 01/01/2025    Pneumococcal Vaccine 65+ (3 of 3 - PCV) 02/22/2025    LIPID PANEL  02/22/2025    ANNUAL WELLNESS VISIT  08/26/2025    DXA SCAN  07/09/2026    COLORECTAL CANCER SCREENING  07/08/2027    HEPATITIS C SCREENING  Completed                                                                                                                                                CMS Preventative Services Quick Reference  Risk Factors Identified During Encounter  Inactivity/Sedentary: Patient was advised to exercise at least 150 minutes a week per CDC recommendations.    The above risks/problems have been discussed with the patient.  Pertinent information has been shared with the patient in the After Visit Summary.  An After Visit Summary and PPPS were made available to the patient.    Follow Up:   Next Medicare Wellness visit to be scheduled in 1 year.         Additional E&M Note during same encounter follows:  Patient has additional, significant, and separately identifiable condition(s)/problem(s) that require work above and beyond the Medicare Wellness Visit  "    Chief Complaint  Medicare Wellness-subsequent (Patient has no questions or concerns. /She would like to get a gyno referral and a dermatologist referral, and prefers female doctors. )    Subjective   HPI  Greta is also being seen today for an annual adult preventative physical exam.  and Greta is also being seen today for additional medical problem/s.    HTN - has been consistently taking BP meds (nifedipine and benazepril) as directed.  BP looks great today.  Denies any medication SE's     HL - chronic; currently taking Crestor 40 mg daily  Lab Results   Component Value Date    CHOL 97 02/22/2024    CHLPL 123 03/24/2021    TRIG 87 02/22/2024    HDL 31 (L) 02/22/2024    LDL 49 02/22/2024     TAVR on 7/28/22 - procedure went well and DUNCAN has resolved.  She is now being seen annually     S/P Pyelolithotomy and Pyeloplasty on 11/7/2022 per Dr. Lopez due to renal calculi and obstruction.  Now followed yearly     Pancreatic cyst - followed by GI; past due for an appt     Has been dealing with Chondrocalcinosis of the right wrist.  Has seen ortho and rheum.  She has been told this will be permanent.  She is still doing PT.  Now on Humira Q2 weeks    Requests referral to GYN due to growing lesion on the vagina.  Had a biopsy 7 years ago in [place] and was told it was a wart    Requests referral to Derm for annual skin check     COVID -3/31/2021, 4/23/2021, and 1/30/2022, 10/29/22  Tdap - 2015  Flu shot -10/29/22  Pneumovax 23 - 4/4/22  Mamm - 6/2/22  Colon- 7/8/2024  DEXA - 7/9/2024    Review of Systems   All other systems reviewed and are negative.             Objective   Vital Signs:  /68 (BP Location: Left arm, Patient Position: Sitting, Cuff Size: Adult)   Pulse 90   Temp 99.3 °F (37.4 °C) (Temporal)   Resp 16   Ht 147.3 cm (57.99\")   Wt 60 kg (132 lb 3.2 oz)   SpO2 98%   BMI 27.64 kg/m²   Physical Exam  Constitutional:       Appearance: She is well-developed.   HENT:      Head: Normocephalic and " atraumatic.   Eyes:      Conjunctiva/sclera: Conjunctivae normal.      Pupils: Pupils are equal, round, and reactive to light.   Cardiovascular:      Rate and Rhythm: Normal rate and regular rhythm.      Heart sounds: Normal heart sounds.   Pulmonary:      Effort: Pulmonary effort is normal.      Breath sounds: Normal breath sounds.   Abdominal:      General: Bowel sounds are normal.      Palpations: Abdomen is soft.   Musculoskeletal:         General: Normal range of motion.      Cervical back: Normal range of motion.   Skin:     General: Skin is warm and dry.   Neurological:      Mental Status: She is alert and oriented to person, place, and time.      Deep Tendon Reflexes: Reflexes are normal and symmetric.   Psychiatric:         Behavior: Behavior normal.         Thought Content: Thought content normal.         Judgment: Judgment normal.                 Assessment and Plan Additional age appropriate preventative wellness advice topics were discussed during today's preventative wellness exam(some topics already addressed during AWV portion of the note above):    Physical Activity: Advised cardiovascular activity 150 minutes per week as tolerated. (example brisk walk for 30 minutes, 5 days a week).     Nutrition: Discussed nutrition plan with patient. Information shared in after visit summary. Goal is for a well balanced diet to enhance overall health.     Healthy Weight: Discussed current and goal BMI with patient. Steps to attain this goal discussed. Information shared in after visit summary.              Medicare annual wellness visit, subsequent    Routine general medical examination at a health care facility    Mixed hyperlipidemia   Lipid abnormalities are stable    Plan:  Continue same medication/s without change.      Discussed medication dosage, use, side effects, and goals of treatment in detail.    Counseled patient on lifestyle modifications to help control hyperlipidemia.     Patient Treatment Goals:    LDL goal is less than 70    Followup in 6 months.  Essential hypertension  Hypertension is stable and controlled  Continue current treatment regimen.  Blood pressure will be reassessed in 6 months.  Aortic valve disorder    Vaginal lesion    Skin lesions    Vitamin D deficiency      Orders Placed This Encounter   Procedures    Lipid Panel     Standing Status:   Future     Order Specific Question:   Release to patient     Answer:   Routine Release [[id number]]    TSH Rfx On Abnormal To Free T4     Standing Status:   Future     Order Specific Question:   Release to patient     Answer:   Routine Release [[id number]]    Vitamin D,25-Hydroxy     Standing Status:   Future     Order Specific Question:   Release to patient     Answer:   Routine Release [[id number]]    Vitamin B12     Standing Status:   Future     Order Specific Question:   Release to patient     Answer:   Routine Release [[id number]]    Ambulatory Referral to Gynecology     Referral Priority:   Routine     Referral Type:   Consultation     Referral Reason:   Specialty Services Required     Requested Specialty:   Gynecology     Number of Visits Requested:   1    Ambulatory Referral to Dermatology     Referral Priority:   Routine     Referral Type:   Consultation     Referral Reason:   Specialty Services Required     Requested Specialty:   Dermatology     Number of Visits Requested:   1             Follow Up   Return in about 6 months (around 2/26/2025) for f/u.  Patient was given instructions and counseling regarding her condition or for health maintenance advice. Please see specific information pulled into the AVS if appropriate.

## 2024-09-06 DIAGNOSIS — I10 ESSENTIAL HYPERTENSION: ICD-10-CM

## 2024-09-06 DIAGNOSIS — E78.2 MIXED HYPERLIPIDEMIA: ICD-10-CM

## 2024-09-06 RX ORDER — BENAZEPRIL HYDROCHLORIDE 40 MG/1
40 TABLET ORAL DAILY
Qty: 90 TABLET | Refills: 1 | Status: SHIPPED | OUTPATIENT
Start: 2024-09-06

## 2024-09-06 NOTE — TELEPHONE ENCOUNTER
Rx Refill Note  Requested Prescriptions     Pending Prescriptions Disp Refills    benazepril (LOTENSIN) 40 MG tablet [Pharmacy Med Name: BENAZEPRIL HCL 40 MG TABLET] 90 tablet 1     Sig: TAKE 1 TABLET BY MOUTH DAILY    NIFEdipine CC (ADALAT CC) 60 MG 24 hr tablet [Pharmacy Med Name: NIFEdipine ER 60 MG TABLET] 30 tablet 0     Sig: TAKE 1 TABLET BY MOUTH DAILY      Last office visit with prescribing clinician: 8/26/2024   Last telemedicine visit with prescribing clinician: Visit date not found   Next office visit with prescribing clinician: 2/27/2025     Nicolle Sharma MA  09/06/24, 09:35 EDT

## 2024-09-09 ENCOUNTER — LAB (OUTPATIENT)
Dept: LAB | Facility: HOSPITAL | Age: 69
End: 2024-09-09
Payer: MEDICARE

## 2024-09-09 DIAGNOSIS — I35.9 AORTIC VALVE DISORDER: ICD-10-CM

## 2024-09-09 DIAGNOSIS — M11.20 CALCIUM PYROPHOSPHATE DEPOSITION DISEASE: ICD-10-CM

## 2024-09-09 DIAGNOSIS — D84.9 IMMUNOSUPPRESSION: ICD-10-CM

## 2024-09-09 DIAGNOSIS — Z00.00 ROUTINE GENERAL MEDICAL EXAMINATION AT A HEALTH CARE FACILITY: ICD-10-CM

## 2024-09-09 DIAGNOSIS — Z00.00 MEDICARE ANNUAL WELLNESS VISIT, SUBSEQUENT: ICD-10-CM

## 2024-09-09 DIAGNOSIS — Z79.899 HIGH RISK MEDICATION USE: ICD-10-CM

## 2024-09-09 DIAGNOSIS — Z79.1 NSAID LONG-TERM USE: ICD-10-CM

## 2024-09-09 DIAGNOSIS — E78.2 MIXED HYPERLIPIDEMIA: ICD-10-CM

## 2024-09-09 DIAGNOSIS — E55.9 VITAMIN D DEFICIENCY: ICD-10-CM

## 2024-09-09 DIAGNOSIS — I10 ESSENTIAL HYPERTENSION: ICD-10-CM

## 2024-09-09 LAB
25(OH)D3 SERPL-MCNC: 50.4 NG/ML (ref 30–100)
ALBUMIN SERPL-MCNC: 4.3 G/DL (ref 3.5–5.2)
ALBUMIN/GLOB SERPL: 1.5 G/DL
ALP SERPL-CCNC: 89 U/L (ref 39–117)
ALT SERPL W P-5'-P-CCNC: 38 U/L (ref 1–33)
ANION GAP SERPL CALCULATED.3IONS-SCNC: 11.7 MMOL/L (ref 5–15)
AST SERPL-CCNC: 48 U/L (ref 1–32)
BASOPHILS # BLD AUTO: 0.04 10*3/MM3 (ref 0–0.2)
BASOPHILS NFR BLD AUTO: 0.9 % (ref 0–1.5)
BILIRUB SERPL-MCNC: 0.8 MG/DL (ref 0–1.2)
BUN SERPL-MCNC: 10 MG/DL (ref 8–23)
BUN/CREAT SERPL: 16.4 (ref 7–25)
CALCIUM SPEC-SCNC: 9.4 MG/DL (ref 8.6–10.5)
CHLORIDE SERPL-SCNC: 101 MMOL/L (ref 98–107)
CHOLEST SERPL-MCNC: 95 MG/DL (ref 0–200)
CO2 SERPL-SCNC: 24.3 MMOL/L (ref 22–29)
CREAT SERPL-MCNC: 0.61 MG/DL (ref 0.57–1)
CRP SERPL-MCNC: <0.3 MG/DL (ref 0–0.5)
DEPRECATED RDW RBC AUTO: 43 FL (ref 37–54)
EGFRCR SERPLBLD CKD-EPI 2021: 97.5 ML/MIN/1.73
EOSINOPHIL # BLD AUTO: 0.31 10*3/MM3 (ref 0–0.4)
EOSINOPHIL NFR BLD AUTO: 6.8 % (ref 0.3–6.2)
ERYTHROCYTE [DISTWIDTH] IN BLOOD BY AUTOMATED COUNT: 12.2 % (ref 12.3–15.4)
ERYTHROCYTE [SEDIMENTATION RATE] IN BLOOD: 11 MM/HR (ref 0–30)
GLOBULIN UR ELPH-MCNC: 2.8 GM/DL
GLUCOSE SERPL-MCNC: 85 MG/DL (ref 65–99)
HCT VFR BLD AUTO: 38.1 % (ref 34–46.6)
HDLC SERPL-MCNC: 28 MG/DL (ref 40–60)
HGB BLD-MCNC: 12.8 G/DL (ref 12–15.9)
IMM GRANULOCYTES # BLD AUTO: 0 10*3/MM3 (ref 0–0.05)
IMM GRANULOCYTES NFR BLD AUTO: 0 % (ref 0–0.5)
LDLC SERPL CALC-MCNC: 50 MG/DL (ref 0–100)
LDLC/HDLC SERPL: 1.79 {RATIO}
LYMPHOCYTES # BLD AUTO: 1.88 10*3/MM3 (ref 0.7–3.1)
LYMPHOCYTES NFR BLD AUTO: 41.2 % (ref 19.6–45.3)
MCH RBC QN AUTO: 32.6 PG (ref 26.6–33)
MCHC RBC AUTO-ENTMCNC: 33.6 G/DL (ref 31.5–35.7)
MCV RBC AUTO: 96.9 FL (ref 79–97)
MONOCYTES # BLD AUTO: 0.38 10*3/MM3 (ref 0.1–0.9)
MONOCYTES NFR BLD AUTO: 8.3 % (ref 5–12)
NEUTROPHILS NFR BLD AUTO: 1.95 10*3/MM3 (ref 1.7–7)
NEUTROPHILS NFR BLD AUTO: 42.8 % (ref 42.7–76)
NRBC BLD AUTO-RTO: 0 /100 WBC (ref 0–0.2)
PLATELET # BLD AUTO: 184 10*3/MM3 (ref 140–450)
PMV BLD AUTO: 10.9 FL (ref 6–12)
POTASSIUM SERPL-SCNC: 4.1 MMOL/L (ref 3.5–5.2)
PROT SERPL-MCNC: 7.1 G/DL (ref 6–8.5)
RBC # BLD AUTO: 3.93 10*6/MM3 (ref 3.77–5.28)
SODIUM SERPL-SCNC: 137 MMOL/L (ref 136–145)
TRIGL SERPL-MCNC: 84 MG/DL (ref 0–150)
TSH SERPL DL<=0.05 MIU/L-ACNC: 1.17 UIU/ML (ref 0.27–4.2)
VIT B12 BLD-MCNC: 1957 PG/ML (ref 211–946)
VLDLC SERPL-MCNC: 17 MG/DL (ref 5–40)
WBC NRBC COR # BLD AUTO: 4.56 10*3/MM3 (ref 3.4–10.8)

## 2024-09-09 PROCEDURE — 80061 LIPID PANEL: CPT

## 2024-09-09 PROCEDURE — 82306 VITAMIN D 25 HYDROXY: CPT

## 2024-09-09 PROCEDURE — 86140 C-REACTIVE PROTEIN: CPT

## 2024-09-09 PROCEDURE — 84443 ASSAY THYROID STIM HORMONE: CPT

## 2024-09-09 PROCEDURE — 85652 RBC SED RATE AUTOMATED: CPT

## 2024-09-09 PROCEDURE — 85025 COMPLETE CBC W/AUTO DIFF WBC: CPT

## 2024-09-09 PROCEDURE — 80053 COMPREHEN METABOLIC PANEL: CPT

## 2024-09-09 PROCEDURE — 82607 VITAMIN B-12: CPT

## 2024-09-09 PROCEDURE — 36415 COLL VENOUS BLD VENIPUNCTURE: CPT

## 2024-10-07 DIAGNOSIS — E78.2 MIXED HYPERLIPIDEMIA: ICD-10-CM

## 2024-10-07 DIAGNOSIS — I10 ESSENTIAL HYPERTENSION: ICD-10-CM

## 2024-10-07 RX ORDER — ROSUVASTATIN CALCIUM 40 MG/1
TABLET, COATED ORAL
Qty: 90 TABLET | Refills: 1 | Status: SHIPPED | OUTPATIENT
Start: 2024-10-07

## 2024-10-07 NOTE — TELEPHONE ENCOUNTER
Rx Refill Note  Requested Prescriptions     Pending Prescriptions Disp Refills    rosuvastatin (CRESTOR) 40 MG tablet [Pharmacy Med Name: ROSUVASTATIN CALCIUM 40 MG TAB] 90 tablet 1     Sig: TAKE 1 TABLET BY MOUTH DAILY    NIFEdipine CC (ADALAT CC) 60 MG 24 hr tablet [Pharmacy Med Name: NIFEdipine ER 60 MG TABLET] 90 tablet 1     Sig: TAKE 1 TABLET BY MOUTH DAILY      Last office visit with prescribing clinician: 8/26/2024     Next office visit with prescribing clinician: 2/27/2025     Cee Winston  10/07/24, 13:01 EDT

## 2024-11-11 ENCOUNTER — OFFICE VISIT (OUTPATIENT)
Age: 69
End: 2024-11-11
Payer: MEDICARE

## 2024-11-11 ENCOUNTER — LAB (OUTPATIENT)
Facility: HOSPITAL | Age: 69
End: 2024-11-11
Payer: MEDICARE

## 2024-11-11 VITALS
TEMPERATURE: 98 F | HEIGHT: 58 IN | SYSTOLIC BLOOD PRESSURE: 142 MMHG | BODY MASS INDEX: 27.73 KG/M2 | HEART RATE: 87 BPM | WEIGHT: 132.1 LBS | DIASTOLIC BLOOD PRESSURE: 78 MMHG

## 2024-11-11 DIAGNOSIS — Z79.899 HIGH RISK MEDICATION USE: ICD-10-CM

## 2024-11-11 DIAGNOSIS — M11.20 CALCIUM PYROPHOSPHATE DEPOSITION DISEASE (CPPD): Primary | ICD-10-CM

## 2024-11-11 DIAGNOSIS — D84.821 IMMUNOSUPPRESSION DUE TO DRUG THERAPY: ICD-10-CM

## 2024-11-11 DIAGNOSIS — Z79.899 IMMUNOSUPPRESSION DUE TO DRUG THERAPY: ICD-10-CM

## 2024-11-11 DIAGNOSIS — M11.20 CALCIUM PYROPHOSPHATE DEPOSITION DISEASE (CPPD): ICD-10-CM

## 2024-11-11 LAB
BASOPHILS # BLD AUTO: 0.07 10*3/MM3 (ref 0–0.2)
BASOPHILS NFR BLD AUTO: 1.1 % (ref 0–1.5)
DEPRECATED RDW RBC AUTO: 43.9 FL (ref 37–54)
EOSINOPHIL # BLD AUTO: 0.27 10*3/MM3 (ref 0–0.4)
EOSINOPHIL NFR BLD AUTO: 4.4 % (ref 0.3–6.2)
ERYTHROCYTE [DISTWIDTH] IN BLOOD BY AUTOMATED COUNT: 12.4 % (ref 12.3–15.4)
ERYTHROCYTE [SEDIMENTATION RATE] IN BLOOD: 15 MM/HR (ref 0–30)
HCT VFR BLD AUTO: 40.7 % (ref 34–46.6)
HGB BLD-MCNC: 13.1 G/DL (ref 12–15.9)
IMM GRANULOCYTES # BLD AUTO: 0.02 10*3/MM3 (ref 0–0.05)
IMM GRANULOCYTES NFR BLD AUTO: 0.3 % (ref 0–0.5)
LYMPHOCYTES # BLD AUTO: 1.95 10*3/MM3 (ref 0.7–3.1)
LYMPHOCYTES NFR BLD AUTO: 32 % (ref 19.6–45.3)
MCH RBC QN AUTO: 31.3 PG (ref 26.6–33)
MCHC RBC AUTO-ENTMCNC: 32.2 G/DL (ref 31.5–35.7)
MCV RBC AUTO: 97.4 FL (ref 79–97)
MONOCYTES # BLD AUTO: 0.47 10*3/MM3 (ref 0.1–0.9)
MONOCYTES NFR BLD AUTO: 7.7 % (ref 5–12)
NEUTROPHILS NFR BLD AUTO: 3.31 10*3/MM3 (ref 1.7–7)
NEUTROPHILS NFR BLD AUTO: 54.5 % (ref 42.7–76)
NRBC BLD AUTO-RTO: 0 /100 WBC (ref 0–0.2)
PLATELET # BLD AUTO: 208 10*3/MM3 (ref 140–450)
PMV BLD AUTO: 10.9 FL (ref 6–12)
RBC # BLD AUTO: 4.18 10*6/MM3 (ref 3.77–5.28)
WBC NRBC COR # BLD AUTO: 6.09 10*3/MM3 (ref 3.4–10.8)

## 2024-11-11 PROCEDURE — 85025 COMPLETE CBC W/AUTO DIFF WBC: CPT

## 2024-11-11 PROCEDURE — 86480 TB TEST CELL IMMUN MEASURE: CPT

## 2024-11-11 PROCEDURE — 86140 C-REACTIVE PROTEIN: CPT

## 2024-11-11 PROCEDURE — G2211 COMPLEX E/M VISIT ADD ON: HCPCS | Performed by: INTERNAL MEDICINE

## 2024-11-11 PROCEDURE — 99214 OFFICE O/P EST MOD 30 MIN: CPT | Performed by: INTERNAL MEDICINE

## 2024-11-11 PROCEDURE — 1159F MED LIST DOCD IN RCRD: CPT | Performed by: INTERNAL MEDICINE

## 2024-11-11 PROCEDURE — 3078F DIAST BP <80 MM HG: CPT | Performed by: INTERNAL MEDICINE

## 2024-11-11 PROCEDURE — 36415 COLL VENOUS BLD VENIPUNCTURE: CPT

## 2024-11-11 PROCEDURE — 80053 COMPREHEN METABOLIC PANEL: CPT

## 2024-11-11 PROCEDURE — 1160F RVW MEDS BY RX/DR IN RCRD: CPT | Performed by: INTERNAL MEDICINE

## 2024-11-11 PROCEDURE — 3077F SYST BP >= 140 MM HG: CPT | Performed by: INTERNAL MEDICINE

## 2024-11-11 PROCEDURE — 85652 RBC SED RATE AUTOMATED: CPT

## 2024-11-11 RX ORDER — ADALIMUMAB 40MG/0.8ML
40 KIT SUBCUTANEOUS
Qty: 2 EACH | Refills: 5 | Status: SHIPPED | OUTPATIENT
Start: 2024-11-11 | End: 2024-11-13 | Stop reason: SDUPTHER

## 2024-11-11 NOTE — PATIENT INSTRUCTIONS
Adalimumab Injection    What is this medication?  ADALIMUMAB (ay da HUGHES francheska mab) treats autoimmune conditions, such as psoriasis, arthritis, Crohn's disease, and ulcerative colitis. It works by slowing down an overactive immune system. It belongs to a group of medications called TNF inhibitors. It is a monoclonal antibody.    This medicine may be used for other purposes; ask your health care provider or pharmacist if you have questions.  COMMON BRAND NAME(S): ABRILADA, AMJEVITA, CYLTEZO, HADLIMA, Hulio, Hulio PEN, Humira, Hyrimoz, Idacio, Yuflyma, YUSIMRY    What should I tell my care team before I take this medication?  They need to know if you have any of these conditions:  Cancer  Diabetes (high blood sugar)  Having surgery  Heart disease  Hepatitis B  Immune system problems  Infections, such as tuberculosis (TB) or other bacterial, fungal, or viral infections  Multiple sclerosis  Recent or upcoming vaccine  An unusual or allergic reaction to adalimumab, mannitol, latex, rubber, other medications, foods, dyes, or preservatives  Pregnant or trying to get pregnant  Breast-feeding    How should I use this medication?  This medication is injected under the skin. It may be given by your care team in a hospital or clinic setting. It may also be given at home.  If you get this medication at home, you will be taught how to prepare and give it. Use exactly as directed. Take it as directed on the prescription label. Keep taking it unless your care team tells you to stop.    This medication comes with INSTRUCTIONS FOR USE. Ask your pharmacist for directions on how to use this medication. Read the information carefully. Talk to your pharmacist or care team if you have questions.    It is important that you put your used needles and syringes in a special sharps container. Do not put them in a trash can. If you do not have a sharps container, call your pharmacist or care team to get one.    A special MedGuide will be given to  you by the pharmacist with each prescription and refill. If you are getting this medication in a hospital or clinic, a special MedGuide will be given to you before each treatment. Be sure to read this information carefully each time.    Talk to your care team about the use of this medication in children. While it be prescribed for children as young as 2 years for selected conditions, precautions do apply.    Overdosage: If you think you have taken too much of this medicine contact a poison control center or emergency room at once.  NOTE: This medicine is only for you. Do not share this medicine with others.    What if I miss a dose?  If you get this medication at the hospital or clinic: it is important not to miss your dose. Call your care team if you are unable to keep an appointment.  If you give yourself this medication at home: If you miss a dose, take it as soon as you can. If it is almost time for your next dose, take only that dose. Do not take double or extra doses. Call your care team with questions.    What may interact with this medication?  Do not take this medication with any of the following:  Abatacept  Anakinra  Biologic medications, such as certolizumab, etanercept, golimumab, infliximab  Live virus vaccines  This medication may also interact with the following:  Cyclosporine  Theophylline  Vaccines  Warfarin  This list may not describe all possible interactions. Give your health care provider a list of all the medicines, herbs, non-prescription drugs, or dietary supplements you use. Also tell them if you smoke, drink alcohol, or use illegal drugs. Some items may interact with your medicine.    What should I watch for while using this medication?  Visit your care team for regular checks on your progress. Tell your care team if your symptoms do not start to get better or if they get worse.    You will be tested for tuberculosis (TB) before you start this medication. If your care team prescribes any  medication for TB, you should start taking the TB medication before starting this medication. Make sure to finish the full course of TB medication.    This medication may increase your risk of getting an infection. Call your care team for advice if you get a fever, chills, sore throat, or other symptoms of a cold or flu. Do not treat yourself. Try to avoid being around people who are sick.    Talk to your care team about your risk of cancer. You may be more at risk for certain types of cancer if you take this medication.    What side effects may I notice from receiving this medication?  Side effects that you should report to your care team as soon as possible:  Allergic reactions--skin rash, itching, hives, swelling of the face, lips, tongue, or throat  Aplastic anemia--unusual weakness or fatigue, dizziness, headache, trouble breathing, increased bleeding or bruising  Body pain, tingling, or numbness  Heart failure--shortness of breath, swelling of the ankles, feet, or hands, sudden weight gain, unusual weakness or fatigue  Infection--fever, chills, cough, sore throat, wounds that don't heal, pain or trouble when passing urine, general feeling of discomfort or being unwell  Lupus-like syndrome--joint pain, swelling, or stiffness, butterfly-shaped rash on the face, rashes that get worse in the sun, fever, unusual weakness or fatigue  Unusual bruising or bleeding    Side effects that usually do not require medical attention (report to your care team if they continue or are bothersome):  Headache  Nausea  Pain, redness, or irritation at injection site  Runny or stuffy nose  Sore throat  Stomach pain    This list may not describe all possible side effects. Call your doctor for medical advice about side effects. You may report side effects to FDA at 5-627-FDA-1601.    Where should I keep my medication?  Keep out of the reach of children and pets.    Store in the refrigerator. Do not freeze. Keep this medication in the  original packaging until you are ready to take it. Protect from light. Get rid of any unused medication after the expiration date.    This medication may be stored at room temperature for up to 14 days. Keep this medication in the original packaging. Protect from light. If it is stored at room temperature, get rid of any unused medication after 14 days or after it expires, whichever is first.    To get rid of medications that are no longer needed or have :  Take the medication to a medication take-back program. Check with your pharmacy or law enforcement to find a location.  If you cannot return the medication, ask your pharmacist or care team how to get rid of this medication safely.    NOTE: This sheet is a summary. It may not cover all possible information. If you have questions about this medicine, talk to your doctor, pharmacist, or health care provider.  ©  Elsevier/Gold Standard (2023 00:00:00)

## 2024-11-11 NOTE — PROGRESS NOTES
Office Follow Up      Date: 11/11/2024   Patient Name: Greta Marino  MRN: [id number]  YOB: 1955    Referring Physician: Alia Vasquez AP*     Chief Complaint: CPPD, OA, right wrist pain and swelling      History of Present Illness: Greta Marino is a 69 y.o. female who is here today for follow up on right wrist swelling and pain, CPPD and Osteoarthritis    HPI Details:  Initial consult 8/17/23:  68-year-old  female with history of generalized osteoarthritis, aortic valve replacement 7/22 seen today in consultation from [facility] Dr. Mendez for new sudden onset pain, redness, warmth, swelling right wrist starting 7/27/23.  She denies any injury or fall.  Her right wrist just started aching and swelling for no reason one evening in late July.  She has never had anything like this before.  No history of gout.  No fevers.  No other joints seem to be involved beyond the right wrist and dorsum of the right hand.  It is hard to use her right hand presently.  No troubles with the left hand beyond her chronic osteoarthritis.    X-ray right wrist 7/27/23 reportedly showed no fracture, advanced degenerative joint disease, positive chondrocalcinosis, soft tissue swelling wrist.  Labs have shown negative WEN, negative rheumatoid factor, elevated inflammatory markers CRP/sed rate    There is suspicion for pseudogout right wrist.  She was treated with prednisone taper by her PCP starting 8/1/23.  This did not seem to help much.  She was then treated with naproxen followed by celecoxib which she currently is taking b.i.d..  She is currently doing occupational therapy.  She consulted with [facility] Dr. Mendez 8/10/23 who referred her on to our Rheumatology Clinic due to concern for inflammatory arthritis.    She has no history of psoriasis, rheumatoid arthritis, lupus or systemic inflammatory rheumatic disease  Denies Raynaud's, malar rash, oral  nasal ulcers, pleurisy/pericarditis, renal/hematologic abnormalities, clotting disorder, seizure disorder, iritis.    Follow-up 8/31/23:  There is some slight improvement in the right wrist after steroid taper.  She continues on colchicine once daily.  She is not taking Celebrex often.  Swelling is somewhat down.  Still quite swollen and hard to bend.  Doing hand therapy.  No other joints involved.  No fevers night sweats weight loss    Follow-up 9/26/23:  She reports the right wrist is gradually improving.  No pain in the right wrist any longer.  Right wrist is still swollen and cannot make a fist with the right hand.  Continues hand therapy.  No side effects to methotrexate.  Well tolerated and seems to be gradually helping.  Continues celecoxib and diclofenac gel.    Follow-up 11/30/23:  Right wrist continues to gradually improve.  Still some swelling to the right wrist but no pain in the wrist any longer unless she works at therapy.  No side effects to methotrexate.  She thinks it helps her wrist overall.  Just taking Celebrex and diclofenac as needed.  Continues hand therapy    Follow-up 1/12/24:  Not doing well.  Right wrist remains swollen and mildly painful.  She still cannot bend her right hand fingers to make a fist.  She continues hand therapy.  Improvement right wrist pain/swelling on methotrexate seems to have plateaued.  The right wrist is definitely less swollen and less painful than it was when it initially started July 2023. Methotrexate has helped somewhat.  Still decreased function with movement right wrist and right fingers.  She was offered surgery but [facility] on the wrist, but they do not think it would improve her range of motion but could improve pain.  She reports the pain is not as concerning to her as it is relatively mild.  Her main concern is decreased range of motion right wrist and fingers.    Follow-up 4/9/24:  Improved.  Right wrist swelling and pain has resolved on  methotrexate.  She is concerned that LFTs have med mildly elevated the past 2 sets of labs.  She just got approved for Humira assistance program but has not actually received Humira yet.  She wants to proceed with the Humira and consider stopping the methotrexate given LFT elevation.  No motion in the right wrist but no longer swollen or painful    Follow-up 11/11/2024:: She has been doing very well.  She started on Humira April 2024 and has been doing well.  No side effect.  No serious infection.  No flares or pain in the right wrist.  No swelling.  Remains off methotrexate.  LFTs have improved.  Joints are stiff for a few minutes when she wakes in the morning.      Subjective       Review of Systems: Review of Systems   Constitutional:  Negative for chills, fatigue, fever and unexpected weight loss.   HENT:  Negative for mouth sores, sinus pressure and sore throat.         Dry mouth  Nose sores   Eyes:  Negative for pain and redness.        Dry eyes   Respiratory:  Negative for cough and shortness of breath.    Cardiovascular:  Negative for chest pain.   Gastrointestinal:  Negative for abdominal pain, blood in stool, diarrhea, nausea, vomiting and GERD.   Endocrine: Negative for polydipsia and polyuria.   Genitourinary:  Negative for dysuria, genital sores and hematuria.   Musculoskeletal:  Positive for arthralgias. Negative for back pain, joint swelling, myalgias, neck pain and neck stiffness.   Skin:  Negative for rash and bruise.        Psoriasis  Photosensitvity  Malar rash   Allergic/Immunologic: Negative for immunocompromised state.   Neurological:  Negative for seizures, weakness, numbness and memory problem.   Hematological:  Negative for adenopathy. Does not bruise/bleed easily.   Psychiatric/Behavioral:  Negative for depressed mood. The patient is not nervous/anxious.         Medications:   Current Outpatient Medications:     alendronate (FOSAMAX) 10 MG tablet, TAKE ONE TABLET BY MOUTH EVERY MORNING  "BEFORE BREAKFAST, Disp: 90 tablet, Rfl: 1    aspirin 81 MG EC tablet, Take 1 tablet by mouth Daily., Disp: 100 tablet, Rfl: 3    benazepril (LOTENSIN) 40 MG tablet, TAKE 1 TABLET BY MOUTH DAILY, Disp: 90 tablet, Rfl: 1    Cholecalciferol (Vitamin D3) 10 MCG (400 UNIT) capsule, Take 4,000 Units by mouth Daily., Disp: , Rfl:     Diclofenac Sodium (VOLTAREN) 1 % gel gel, APPLY TWO TO FOUR GRAMS TO AFFECTED AREA(S) THREE TO FOUR TIMES DAILY AS NEEDED, Disp: 100 g, Rfl: 3    NIFEdipine CC (ADALAT CC) 60 MG 24 hr tablet, TAKE 1 TABLET BY MOUTH DAILY, Disp: 90 tablet, Rfl: 1    rosuvastatin (CRESTOR) 40 MG tablet, TAKE 1 TABLET BY MOUTH DAILY, Disp: 90 tablet, Rfl: 1    Adalimumab (Humira, 2 Pen,) 40 MG/0.8ML Auto-injector Kit, Inject 40 mg under the skin into the appropriate area as directed Every 14 (Fourteen) Days., Disp: 2 each, Rfl: 5    Allergies:   Allergies   Allergen Reactions    Peanut-Containing Drug Products Hives, Shortness Of Breath and Swelling       I have reviewed and updated the patient's chief complaint, history of present illness, review of systems, past medical history, surgical history, family history, social history, medications and allergy list as appropriate.     Objective        Vital Signs:   Vitals:    11/11/24 1425   BP: 142/78   BP Location: Right arm   Patient Position: Sitting   Cuff Size: Adult   Pulse: 87   Temp: 98 °F (36.7 °C)   Weight: 59.9 kg (132 lb 1.6 oz)   Height: 147.3 cm (57.99\")   PainSc:   3       Body mass index is 27.62 kg/m².      Physical Exam:  Physical Exam   MUSCULOSKELETAL:   No peripheral synovitis.  No tender joints  Right wrist with no swelling erythema or pain; markedly decreased range of motion right wrist.    No synovitis hands or left wrist.  No ulcer.  No drainage.  No rash  No rheumatoid nodules or tophi.  Heberden and Ector's nodes present throughout the finger joints    Complete joint exam was performed including the MCPs, PIPs, DIPs of the hands, wrists, " "elbows, shoulders, hips, knees and ankles.  No soft tissue swelling or tenderness is present except as above.    General: The patient is well-developed and well nourished. Cooperative, alert and oriented. Affect is normal. Hydration appears normal.   HEENT: Normocephalic and atraumatic. No notable alopecia. Lids and conjunctiva are normal. Pupils are equal and sclera are clear. Oropharynx is clear   NECK neck is supple without adenopathy, masses or thyromegaly.   CARDIOVASCULAR: Regular rate and rhythm.   LUNGS: Effort is normal. Lungs are clear bilateral   ABDOMEN: Not examined  EXTREMITIES: Peripheral pulses are intact. No clubbing.   SKIN: No rashes. No subcutaneous nodules. No digital ulcers. No sclerodactyly.   NEUROLOGIC: Gait is normal. Strength testing is normal.  No focal neurologic deficits    Results Review:   Labs:   Lab Results   Component Value Date    GLUCOSE 85 09/09/2024    BUN 10 09/09/2024    CREATININE 0.61 09/09/2024    EGFR 97.5 09/09/2024    BCR 16.4 09/09/2024    K 4.1 09/09/2024    CO2 24.3 09/09/2024    CALCIUM 9.4 09/09/2024    PROTENTOTREF 6.6 03/24/2021    ALBUMIN 4.3 09/09/2024    BILITOT 0.8 09/09/2024    AST 48 (H) 09/09/2024    ALT 38 (H) 09/09/2024     Lab Results   Component Value Date    WBC 4.56 09/09/2024    HGB 12.8 09/09/2024    HCT 38.1 09/09/2024    MCV 96.9 09/09/2024     09/09/2024     Lab Results   Component Value Date    SEDRATE 11 09/09/2024     Lab Results   Component Value Date    CRP <0.30 09/09/2024     No results found for: \"QUANTIFERO\", \"QUANTITB1\", \"QUANTITB2\", \"QUANTIFERN\", \"QUANTIFERM\", \"QUANTITBGLDP\"  No results found for: \"RF\"  Lab Results   Component Value Date    HEPBSAG Non-Reactive 03/01/2024    HEPAIGM Non-Reactive 03/01/2024    HEPBIGMCORE Non-Reactive 03/01/2024    HEPCVIRUSABY Non-Reactive 03/01/2024         Procedures    Assessment / Plan        Calcium pyrophosphate deposition disease (CPPD)  Part-time .  .  Lives with " son/daughter-in-law   Sudden onset right wrist pain swelling 7/27/23  Consult from [facility] Dr. Mendez 8/10/23  X-ray right wrist 7/27/23:  Advanced degenerative joint disease, +chondrocalcinosis, no fracture, soft tissue swelling present  Labs 8/4/23:  Elevated CRP 4.14, elevated ESR 68, negative WEN, negative RF,  normal LFTs, normal creatinine, magnesium 2.2, calcium 10.1, uric acid 2.0  Tried:  Occupational therapy, Naproxen, celecoxib, prednisone 8/1/23, prednisone taper starting 40 mg 8/17/23, colchicine  **Current:  diclofenac gel, celecoxib, Humira 4/24([place] [employer] assist)  Prior methotrexate 8/31/23-4/24 (LFTs).     Sudden onset inflammatory monoarthritis right wrist 7/27/23.    + chondrocalcinosis right wrist on x-ray along with degenerative changes  Elevated ESR/CRP on labs.  Negative WEN.  Negative rheumatoid factor  No history of injury/wrist trauma.  No history of gout.  No fever, night sweats     Suspect pseudogout/CPPD right wrist 7/27/23 with evidence chondrocalcinosis supportive of pseudogout diagnosis on wrist x-ray as above.   Seronegative rheumatoid also a consideration in the differential.   Normal uric acid  No fracture.  Osteoarthritis noted on x-ray  Less likely gout with wrist involvement and no history of gout, normal uric acid.    Unlikely septic arthritis.  No clinical evidence of psoriatic arthritis, lupus or connective tissue disease  WEN negative        Overall right wrist inflammatory arthritis is much improved   Low disease activity on Humira.  No swelling or tenderness to the wrist or hand or any peripheral joint today.    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  Previously considered surgery with [facility] on the right wrist, but they have told her while surgery would possibly help wrist pain , it would not improve her range of motion in the wrist.     -Continue Humira 40 mg every 2 weeks  Recommend diclofenac gel as  18-Jan-2023 11:03 needed  Recommend continue celecoxib as needed and hand therapy  -Labs reviewed from 6/24 CBC CMP are stable.  Continue lab monitoring every 4 to 6 months  Update Q TB yearly  Return to clinic 4 months    High risk medication use  Humira  Q TB - 8/31/2023, update 11/11/2024  Hepatitis panel - 8/31/2023     Well-tolerated and effective  We discussed biologic agents at length. Risks and alternatives were discussed at length and the option of no treatment was also given. We discussed risks including but not limited to infections which can be unusual, severe, and deadly. When possible, these agents should be stopped immediately if infections occur. Unusual infection such as TB and fungal infections can occur. There may be an increased risk of lymphoma with these agents. Other risks can include a multiple sclerosis-like illness and worsening of heart failure. Infusion or injection reactions which can be deadly have been reported. Studies on pregnancy have not been done so pregnancy should be avoided while on these agents. Reactivation of a deadly brain virus and hepatitis viruses have been reported. Worsening of COPD has been seen with orencia. Elevated lipids, elevation in liver functions, and dangerous changes in blood counts have been seen with certain agents. Regular monitoring will be required.    Immunosuppression due to drug therapy     Generalized osteoarthrosis, involving multiple sites  -prn NSAID  Risks of NSAIDs discussed including GI upset, GI bleeding, renal and hepatic risks and the risks of cardiovascular disease and stroke. Warned patient not to take with other NSAIDs including OTC NSAIDs    NSAID long-term use     Status post aortic valve replacement  7/22 with Dr. Jaylan Mckay      Assessment & Plan  Calcium pyrophosphate deposition disease (CPPD)    High risk medication use    Immunosuppression due to drug therapy      Orders Placed This Encounter   Procedures    CBC Auto Differential     Comprehensive Metabolic Panel    Sedimentation Rate    C-reactive Protein    QuantiFERON-TB Gold Plus     New Medications Ordered This Visit   Medications    Adalimumab (Humira, 2 Pen,) 40 MG/0.8ML Auto-injector Kit     Sig: Inject 40 mg under the skin into the appropriate area as directed Every 14 (Fourteen) Days.     Dispense:  2 each     Refill:  5             Follow Up:   Return in about 4 months (around 3/11/2025).        Cleveland Felton MD  [facility] [place]

## 2024-11-12 LAB
ALBUMIN SERPL-MCNC: 4.3 G/DL (ref 3.5–5.2)
ALBUMIN/GLOB SERPL: 1.5 G/DL
ALP SERPL-CCNC: 94 U/L (ref 39–117)
ALT SERPL W P-5'-P-CCNC: 42 U/L (ref 1–33)
ANION GAP SERPL CALCULATED.3IONS-SCNC: 8.9 MMOL/L (ref 5–15)
AST SERPL-CCNC: 58 U/L (ref 1–32)
BILIRUB SERPL-MCNC: 0.7 MG/DL (ref 0–1.2)
BUN SERPL-MCNC: 15 MG/DL (ref 8–23)
BUN/CREAT SERPL: 18.5 (ref 7–25)
CALCIUM SPEC-SCNC: 9.6 MG/DL (ref 8.6–10.5)
CHLORIDE SERPL-SCNC: 106 MMOL/L (ref 98–107)
CO2 SERPL-SCNC: 23.1 MMOL/L (ref 22–29)
CREAT SERPL-MCNC: 0.81 MG/DL (ref 0.57–1)
CRP SERPL-MCNC: <0.3 MG/DL (ref 0–0.5)
EGFRCR SERPLBLD CKD-EPI 2021: 79.2 ML/MIN/1.73
GLOBULIN UR ELPH-MCNC: 2.9 GM/DL
GLUCOSE SERPL-MCNC: 101 MG/DL (ref 65–99)
POTASSIUM SERPL-SCNC: 4.5 MMOL/L (ref 3.5–5.2)
PROT SERPL-MCNC: 7.2 G/DL (ref 6–8.5)
SODIUM SERPL-SCNC: 138 MMOL/L (ref 136–145)

## 2024-11-13 ENCOUNTER — SPECIALTY PHARMACY (OUTPATIENT)
Age: 69
End: 2024-11-13
Payer: MEDICARE

## 2024-11-13 ENCOUNTER — TELEPHONE (OUTPATIENT)
Age: 69
End: 2024-11-13
Payer: MEDICARE

## 2024-11-13 RX ORDER — ADALIMUMAB 40MG/0.4ML
40 KIT SUBCUTANEOUS
Qty: 2 EACH | Refills: 5 | Status: SHIPPED | OUTPATIENT
Start: 2024-11-13

## 2024-11-13 NOTE — TELEPHONE ENCOUNTER
Specialty Pharmacy Patient Management Program  Per Protocol Prescription Order/Refill     Patient currently fills medications at Other and is not enrolled in an Rheumatology Patient Management Program.     Requested Prescriptions     Signed Prescriptions Disp Refills    Adalimumab (Humira, 2 Pen,) 40 MG/0.4ML Auto-injector Kit 2 each 5     Sig: Inject 40 mg under the skin into the appropriate area as directed Every 14 (Fourteen) Days.     Authorizing Provider: SAMY HILLMAN     Ordering User: GRETTA SHARP     Prescription orders above were sent to the pharmacy per Collaborative Care Agreement Protocol.     Gretta Sharp, PharmD, BCPS  Clinical Specialty Pharmacist, Rheumatology   11/13/2024  11:51 EST

## 2024-11-13 NOTE — ADDENDUM NOTE
Addended by: GRETTA FERNANDEZ on: 11/13/2024 11:51 AM     Modules accepted: Orders     History & Physical    SUBJECTIVE:     History of Present Illness:  Patient is a 62 y.o. male presents with  Non healing wound left foot metatarsal stum    No chief complaint on file.      Review of patient's allergies indicates:   Allergen Reactions    Penicillins Rash       Current Facility-Administered Medications   Medication Dose Route Frequency Provider Last Rate Last Admin    lactated ringers infusion   Intravenous Continuous Loy Macias DNP        LIDOcaine (PF) 10 mg/ml (1%) injection 10 mg  1 mL Intradermal Once Loy Macias DNP           Past Medical History:   Diagnosis Date    Coronary artery disease     MI (myocardial infarction)    COVID-19 6/16/2020    Diabetes mellitus     Neuropathy - Retinopathy - PAD    Glaucoma     High cholesterol     Hypertension      Past Surgical History:   Procedure Laterality Date    WOUND DEBRIDEMENT Left 5/23/2022    Procedure: DEBRIDEMENT, WOUND;  Surgeon: Dilip Horner MD;  Location: [facility] OR;  Service: General;  Laterality: Left;     Family History   Problem Relation Age of Onset    Heart disease Father      Social History     Tobacco Use    Smoking status: Current Every Day Smoker     Packs/day: 0.50     Types: Cigarettes     Start date: 10/28/1979    Smokeless tobacco: Never Used   Substance Use Topics    Alcohol use: Yes     Alcohol/week: 4.0 standard drinks     Types: 4 Cans of beer per week    Drug use: No        Review of Systems:    Review of Systems   Constitutional: Negative.    HENT: Negative.    Eyes: Negative.    Respiratory: Negative.    Cardiovascular: Negative.    Gastrointestinal: Negative.    Genitourinary: Negative.    Musculoskeletal: Negative.    Neurological: Negative.    Psychiatric/Behavioral: Negative.        OBJECTIVE:     Vital Signs (Most Recent)              Physical Exam:    Physical Exam  Constitutional:       Appearance: He is well-developed.   HENT:      Head: Normocephalic.   Eyes:      Conjunctiva/sclera:  Conjunctivae normal.      Pupils: Pupils are equal, round, and reactive to light.   Cardiovascular:      Rate and Rhythm: Normal rate and regular rhythm.      Heart sounds: Normal heart sounds.   Pulmonary:      Effort: Pulmonary effort is normal.      Breath sounds: Normal breath sounds.   Abdominal:      General: Bowel sounds are normal.      Palpations: Abdomen is soft.   Musculoskeletal:         General: Normal range of motion.      Cervical back: Normal range of motion and neck supple.   Skin:     General: Skin is warm and dry.   Neurological:      Mental Status: He is alert and oriented to person, place, and time.      Deep Tendon Reflexes: Reflexes are normal and symmetric.         Laboratory      Diagnostic Results:      ASSESSMENT/PLAN:   Non healing wound left foot stump  Dm htn  pvd      PLAN:Plan   Debridement to day

## 2024-11-14 LAB
GAMMA INTERFERON BACKGROUND BLD IA-ACNC: 0.01 IU/ML
M TB IFN-G BLD-IMP: NEGATIVE
M TB IFN-G CD4+ BCKGRND COR BLD-ACNC: 0.01 IU/ML
M TB IFN-G CD4+CD8+ BCKGRND COR BLD-ACNC: 0.01 IU/ML
MITOGEN IGNF BCKGRD COR BLD-ACNC: >10 IU/ML
QUANTIFERON INCUBATION: NORMAL
SERVICE CMNT-IMP: NORMAL

## 2024-11-18 ENCOUNTER — OFFICE VISIT (OUTPATIENT)
Dept: INTERNAL MEDICINE | Facility: CLINIC | Age: 69
End: 2024-11-18
Payer: MEDICARE

## 2024-11-18 VITALS
HEIGHT: 58 IN | DIASTOLIC BLOOD PRESSURE: 72 MMHG | WEIGHT: 133 LBS | BODY MASS INDEX: 27.92 KG/M2 | OXYGEN SATURATION: 100 % | RESPIRATION RATE: 16 BRPM | HEART RATE: 99 BPM | TEMPERATURE: 99.4 F | SYSTOLIC BLOOD PRESSURE: 134 MMHG

## 2024-11-18 DIAGNOSIS — Z95.2 STATUS POST AORTIC VALVE REPLACEMENT: ICD-10-CM

## 2024-11-18 DIAGNOSIS — K76.89 OTHER SPECIFIED DISEASES OF LIVER: ICD-10-CM

## 2024-11-18 DIAGNOSIS — Z12.31 ENCOUNTER FOR SCREENING MAMMOGRAM FOR MALIGNANT NEOPLASM OF BREAST: ICD-10-CM

## 2024-11-18 DIAGNOSIS — R74.8 ELEVATED LIVER ENZYMES: ICD-10-CM

## 2024-11-18 DIAGNOSIS — Z79.899 IMMUNOSUPPRESSION DUE TO DRUG THERAPY: ICD-10-CM

## 2024-11-18 DIAGNOSIS — E78.2 MIXED HYPERLIPIDEMIA: ICD-10-CM

## 2024-11-18 DIAGNOSIS — E55.9 VITAMIN D DEFICIENCY: ICD-10-CM

## 2024-11-18 DIAGNOSIS — D84.821 IMMUNOSUPPRESSION DUE TO DRUG THERAPY: ICD-10-CM

## 2024-11-18 DIAGNOSIS — R94.5 ABNORMAL RESULTS OF LIVER FUNCTION STUDIES: ICD-10-CM

## 2024-11-18 DIAGNOSIS — I10 ESSENTIAL HYPERTENSION: Primary | ICD-10-CM

## 2024-11-18 DIAGNOSIS — M11.20 CALCIUM PYROPHOSPHATE DEPOSITION DISEASE (CPPD): ICD-10-CM

## 2024-11-18 PROCEDURE — 3078F DIAST BP <80 MM HG: CPT | Performed by: NURSE PRACTITIONER

## 2024-11-18 PROCEDURE — 1160F RVW MEDS BY RX/DR IN RCRD: CPT | Performed by: NURSE PRACTITIONER

## 2024-11-18 PROCEDURE — 3075F SYST BP GE 130 - 139MM HG: CPT | Performed by: NURSE PRACTITIONER

## 2024-11-18 PROCEDURE — 1159F MED LIST DOCD IN RCRD: CPT | Performed by: NURSE PRACTITIONER

## 2024-11-18 PROCEDURE — 1126F AMNT PAIN NOTED NONE PRSNT: CPT | Performed by: NURSE PRACTITIONER

## 2024-11-18 PROCEDURE — G2211 COMPLEX E/M VISIT ADD ON: HCPCS | Performed by: NURSE PRACTITIONER

## 2024-11-18 PROCEDURE — 99214 OFFICE O/P EST MOD 30 MIN: CPT | Performed by: NURSE PRACTITIONER

## 2024-11-18 NOTE — PROGRESS NOTES
Subjective   Greta Marino is a 69 y.o. female    Chief Complaint   Patient presents with    Follow-up     Patient is here for a follow up on lab results. Patient states her rheumatologist is making her getting blood work every 8 weeks. And the past two times her liver enzymes have been up and he stated she needed to check with pcp.      History of Present Illness     HTN - has been consistently taking BP meds (nifedipine and benazepril) as directed.  BP looks great today.  Denies any medication SE's     HL - chronic; currently taking Crestor 40 mg daily  Lab Results   Component Value Date    CHOL 95 09/09/2024    CHLPL 123 03/24/2021    TRIG 84 09/09/2024    HDL 28 (L) 09/09/2024    LDL 50 09/09/2024     TAVR on 7/28/22 - procedure went well and DUNCAN has resolved.  She is now being seen annually     S/P Pyelolithotomy and Pyeloplasty on 11/7/2022 per Dr. Lopez due to renal calculi and obstruction.  Now followed yearly     Pancreatic cyst - followed by GI     Has been dealing with Chondrocalcinosis of the right wrist.  Has seen ortho and rheum.  She has been told this will be permanent.  She is still doing PT.  Now on Humira Q2 weeks.  Liver enzymes have been elevated on last 2 checks        COVID -3/31/2021, 4/23/2021, and 1/30/2022, 10/29/22  Tdap - 2015  Flu shot -10/29/22  Pneumovax 23 - 4/4/22  Mamm - 6/2/22  Colon- 7/8/2024  DEXA - 7/9/2024       The following portions of the patient's history were reviewed and updated as appropriate: allergies, current medications, past family history, past medical history, past social history, past surgical history, and problem list.    Current Outpatient Medications:     Adalimumab (Humira, 2 Pen,) 40 MG/0.4ML Auto-injector Kit, Inject 40 mg under the skin into the appropriate area as directed Every 14 (Fourteen) Days., Disp: 2 each, Rfl: 5    alendronate (FOSAMAX) 10 MG tablet, TAKE ONE TABLET BY MOUTH EVERY MORNING BEFORE BREAKFAST, Disp: 90 tablet, Rfl: 1    aspirin 81  MG EC tablet, Take 1 tablet by mouth Daily., Disp: 100 tablet, Rfl: 3    benazepril (LOTENSIN) 40 MG tablet, TAKE 1 TABLET BY MOUTH DAILY, Disp: 90 tablet, Rfl: 1    Cholecalciferol (Vitamin D3) 10 MCG (400 UNIT) capsule, Take 4,000 Units by mouth Daily., Disp: , Rfl:     Diclofenac Sodium (VOLTAREN) 1 % gel gel, APPLY TWO TO FOUR GRAMS TO AFFECTED AREA(S) THREE TO FOUR TIMES DAILY AS NEEDED, Disp: 100 g, Rfl: 3    NIFEdipine CC (ADALAT CC) 60 MG 24 hr tablet, TAKE 1 TABLET BY MOUTH DAILY, Disp: 90 tablet, Rfl: 1    rosuvastatin (CRESTOR) 40 MG tablet, TAKE 1 TABLET BY MOUTH DAILY, Disp: 90 tablet, Rfl: 1     Review of Systems   Constitutional:  Negative for chills, fatigue and fever.   Respiratory:  Negative for cough, chest tightness and shortness of breath.    Cardiovascular:  Negative for chest pain.   Gastrointestinal:  Negative for abdominal pain, diarrhea, nausea and vomiting.   Endocrine: Negative for cold intolerance and heat intolerance.   Musculoskeletal:  Negative for arthralgias.   Neurological:  Negative for dizziness.       Objective   Physical Exam  Constitutional:       Appearance: She is well-developed.   HENT:      Head: Normocephalic and atraumatic.   Eyes:      Conjunctiva/sclera: Conjunctivae normal.      Pupils: Pupils are equal, round, and reactive to light.   Cardiovascular:      Rate and Rhythm: Normal rate and regular rhythm.      Heart sounds: Normal heart sounds.   Pulmonary:      Effort: Pulmonary effort is normal.      Breath sounds: Normal breath sounds.   Abdominal:      General: Bowel sounds are normal.      Palpations: Abdomen is soft.   Musculoskeletal:         General: Normal range of motion.      Cervical back: Normal range of motion.   Skin:     General: Skin is warm and dry.   Neurological:      Mental Status: She is alert and oriented to person, place, and time.      Deep Tendon Reflexes: Reflexes are normal and symmetric.   Psychiatric:         Behavior: Behavior normal.     "     Thought Content: Thought content normal.         Judgment: Judgment normal.       Vitals:    11/18/24 1048   BP: 134/72   BP Location: Right arm   Patient Position: Sitting   Cuff Size: Adult   Pulse: 99   Resp: 16   Temp: 99.4 °F (37.4 °C)   TempSrc: Temporal   SpO2: 100%   Weight: 60.3 kg (133 lb)   Height: 147.3 cm (57.99\")         Assessment & Plan   Diagnoses and all orders for this visit:    1. Essential hypertension (Primary)  -     Basic Metabolic Panel; Future  -     Hepatic Function Panel; Future  -     Hepatitis Panel, Acute; Future  -     Protime-INR; Future  -     APTT; Future  -     PUGH Fibrosure; Future    2. Mixed hyperlipidemia  -     Basic Metabolic Panel; Future  -     Hepatic Function Panel; Future  -     Hepatitis Panel, Acute; Future  -     Protime-INR; Future  -     APTT; Future  -     PU[facility] Fibrosure; Future    3. Status post aortic valve replacement  -     Basic Metabolic Panel; Future  -     Hepatic Function Panel; Future  -     Hepatitis Panel, Acute; Future  -     Protime-INR; Future  -     APTT; Future  -     PU[facility] Fibrosure; Future    4. Immunosuppression due to drug therapy  -     Basic Metabolic Panel; Future  -     Hepatic Function Panel; Future  -     Hepatitis Panel, Acute; Future  -     Protime-INR; Future  -     APTT; Future  -     PU[facility] Fibrosure; Future    5. Calcium pyrophosphate deposition disease (CPPD)  -     Basic Metabolic Panel; Future  -     Hepatic Function Panel; Future  -     Hepatitis Panel, Acute; Future  -     Protime-INR; Future  -     APTT; Future  -     PU[facility] Fibrosure; Future    6. Vitamin D deficiency  -     Basic Metabolic Panel; Future  -     Hepatic Function Panel; Future  -     Hepatitis Panel, Acute; Future  -     Protime-INR; Future  -     APTT; Future  -     PU[facility] Fibrosure; Future    7. Encounter for screening mammogram for malignant neoplasm of breast  -     Mammo Screening Digital Tomosynthesis Bilateral With CAD; Future    8. Elevated liver " enzymes  -     Basic Metabolic Panel; Future  -     Hepatic Function Panel; Future  -     Hepatitis Panel, Acute; Future  -     Protime-INR; Future  -     APTT; Future  -     PU[facility] Fibrosure; Future    9. Abnormal results of liver function studies  -     Basic Metabolic Panel; Future  -     Hepatic Function Panel; Future  -     Hepatitis Panel, Acute; Future  -     Protime-INR; Future  -     APTT; Future  -     PU[facility] Fibrosure; Future    10. Other specified diseases of liver  -     APTT; Future      Labs sent  Will also ck US of the liver  Return in about 6 months (around 5/18/2025) for Medicare Wellness.

## 2024-11-21 ENCOUNTER — LAB (OUTPATIENT)
Dept: LAB | Facility: HOSPITAL | Age: 69
End: 2024-11-21
Payer: MEDICARE

## 2024-11-21 DIAGNOSIS — Z79.899 IMMUNOSUPPRESSION DUE TO DRUG THERAPY: ICD-10-CM

## 2024-11-21 DIAGNOSIS — E55.9 VITAMIN D DEFICIENCY: ICD-10-CM

## 2024-11-21 DIAGNOSIS — Z95.2 STATUS POST AORTIC VALVE REPLACEMENT: ICD-10-CM

## 2024-11-21 DIAGNOSIS — I10 ESSENTIAL HYPERTENSION: ICD-10-CM

## 2024-11-21 DIAGNOSIS — K76.89 OTHER SPECIFIED DISEASES OF LIVER: ICD-10-CM

## 2024-11-21 DIAGNOSIS — R74.8 ELEVATED LIVER ENZYMES: ICD-10-CM

## 2024-11-21 DIAGNOSIS — R94.5 ABNORMAL RESULTS OF LIVER FUNCTION STUDIES: ICD-10-CM

## 2024-11-21 DIAGNOSIS — D84.821 IMMUNOSUPPRESSION DUE TO DRUG THERAPY: ICD-10-CM

## 2024-11-21 DIAGNOSIS — E78.2 MIXED HYPERLIPIDEMIA: ICD-10-CM

## 2024-11-21 DIAGNOSIS — M11.20 CALCIUM PYROPHOSPHATE DEPOSITION DISEASE (CPPD): ICD-10-CM

## 2024-11-21 LAB
APTT PPP: 31.7 SECONDS (ref 22–39)
INR PPP: 0.97 (ref 0.89–1.12)
PROTHROMBIN TIME: 13 SECONDS (ref 12.2–14.5)

## 2024-11-21 PROCEDURE — 82977 ASSAY OF GGT: CPT

## 2024-11-21 PROCEDURE — 85610 PROTHROMBIN TIME: CPT

## 2024-11-21 PROCEDURE — 83883 ASSAY NEPHELOMETRY NOT SPEC: CPT

## 2024-11-21 PROCEDURE — 85730 THROMBOPLASTIN TIME PARTIAL: CPT

## 2024-11-21 PROCEDURE — 36415 COLL VENOUS BLD VENIPUNCTURE: CPT

## 2024-11-21 PROCEDURE — 84460 ALANINE AMINO (ALT) (SGPT): CPT

## 2024-11-21 PROCEDURE — 82172 ASSAY OF APOLIPOPROTEIN: CPT

## 2024-11-21 PROCEDURE — 84478 ASSAY OF TRIGLYCERIDES: CPT

## 2024-11-21 PROCEDURE — 82947 ASSAY GLUCOSE BLOOD QUANT: CPT

## 2024-11-21 PROCEDURE — 84450 TRANSFERASE (AST) (SGOT): CPT

## 2024-11-21 PROCEDURE — 83010 ASSAY OF HAPTOGLOBIN QUANT: CPT

## 2024-11-21 PROCEDURE — 82247 BILIRUBIN TOTAL: CPT

## 2024-11-21 PROCEDURE — 82465 ASSAY BLD/SERUM CHOLESTEROL: CPT

## 2024-11-26 ENCOUNTER — TELEPHONE (OUTPATIENT)
Dept: INTERNAL MEDICINE | Facility: CLINIC | Age: 69
End: 2024-11-26
Payer: MEDICARE

## 2024-11-26 DIAGNOSIS — K75.81 NASH (NONALCOHOLIC STEATOHEPATITIS): Primary | ICD-10-CM

## 2024-11-26 LAB
A2 MACROGLOB SERPL-MCNC: 233 MG/DL (ref 110–276)
ALT SERPL W P-5'-P-CCNC: 41 IU/L (ref 0–40)
APO A-I SERPL-MCNC: 106 MG/DL (ref 116–209)
AST SERPL W P-5'-P-CCNC: 62 IU/L (ref 0–40)
BILIRUB SERPL-MCNC: 0.7 MG/DL (ref 0–1.2)
CHOLEST SERPL-MCNC: 106 MG/DL (ref 100–199)
FIBROSIS SCORING:: ABNORMAL
FIBROSIS STAGE SERPL QL: ABNORMAL
GGT SERPL-CCNC: 67 IU/L (ref 0–60)
GLUCOSE SERPL-MCNC: 87 MG/DL (ref 70–99)
HAPTOGLOB SERPL-MCNC: <10 MG/DL (ref 37–355)
LABORATORY COMMENT REPORT: ABNORMAL
LIVER FIBR SCORE SERPL CALC.FIBROSURE: 0.87 (ref 0–0.21)
LIVER STEATOSIS GRADE SERPL QL: ABNORMAL
LIVER STEATOSIS SCORE SERPL: 0.55 (ref 0–0.4)
NASH GRADE SERPL QL: ABNORMAL
NASH INTERPRETATION SERPL-IMP: ABNORMAL
NASH SCORE SERPL: 0.87 (ref 0–0.25)
NASH SCORING: ABNORMAL
STEATOSIS SCORING: ABNORMAL
TEST PERFORMANCE INFO SPEC: ABNORMAL
TEST PERFORMANCE INFO SPEC: ABNORMAL
TRIGL SERPL-MCNC: 103 MG/DL (ref 0–149)

## 2024-11-26 NOTE — TELEPHONE ENCOUNTER
Left patient voicemail to go over lab results.   Office number provided.       HUB RELAY  Please let pt know that her additional liver testing is concerning for cirrhosis of the liver.  I am going to refer her to GI for further evaluation

## 2024-11-26 NOTE — TELEPHONE ENCOUNTER
----- Message from Alia Warentyre sent at 11/26/2024 12:54 PM EST -----  Please let pt know that her additional liver testing is concerning for cirrhosis of the liver.  I am going to refer her to GI for further evaluation

## 2024-12-09 RX ORDER — FOLIC ACID 1 MG/1
1000 TABLET ORAL DAILY
Qty: 90 TABLET | OUTPATIENT
Start: 2024-12-09

## 2024-12-09 RX ORDER — ALENDRONATE SODIUM 10 MG/1
10 TABLET ORAL
Qty: 90 TABLET | Refills: 1 | Status: SHIPPED | OUTPATIENT
Start: 2024-12-09 | End: 2024-12-15 | Stop reason: SDUPTHER

## 2024-12-09 NOTE — TELEPHONE ENCOUNTER
Rx Refill Note  Requested Prescriptions     Pending Prescriptions Disp Refills    alendronate (FOSAMAX) 10 MG tablet [Pharmacy Med Name: ALENDRONATE SODIUM 10 MG TAB] 90 tablet 1     Sig: TAKE ONE TABLET BY MOUTH EVERY MORNING BEFORE BREAKFAST      Last office visit with prescribing clinician: 11/18/2024   Last telemedicine visit with prescribing clinician: Visit date not found   Next office visit with prescribing clinician: 2/27/2025       Nicolle Sharma MA  12/09/24, 12:14 EST

## 2024-12-16 RX ORDER — ALENDRONATE SODIUM 10 MG/1
10 TABLET ORAL
Qty: 90 TABLET | Refills: 1 | Status: SHIPPED | OUTPATIENT
Start: 2024-12-16

## 2024-12-16 NOTE — TELEPHONE ENCOUNTER
Rx Refill Note  Requested Prescriptions     Pending Prescriptions Disp Refills    alendronate (FOSAMAX) 10 MG tablet 90 tablet 1     Sig: Take 1 tablet by mouth Every Morning Before Breakfast.      Last office visit with prescribing clinician: 11/18/2024   Last telemedicine visit with prescribing clinician: Visit date not found   Next office visit with prescribing clinician: 2/27/2025       Nicolle Sharma MA  12/16/24, 08:37 EST

## 2024-12-17 ENCOUNTER — HOSPITAL ENCOUNTER (OUTPATIENT)
Dept: ULTRASOUND IMAGING | Facility: HOSPITAL | Age: 69
Discharge: HOME | End: 2024-12-17
Admitting: NURSE PRACTITIONER
Payer: MEDICARE

## 2024-12-17 DIAGNOSIS — K76.89 OTHER SPECIFIED DISEASES OF LIVER: ICD-10-CM

## 2024-12-17 DIAGNOSIS — R74.8 ELEVATED LIVER ENZYMES: ICD-10-CM

## 2024-12-17 DIAGNOSIS — R94.5 ABNORMAL RESULTS OF LIVER FUNCTION STUDIES: ICD-10-CM

## 2024-12-17 PROCEDURE — 76705 ECHO EXAM OF ABDOMEN: CPT

## 2024-12-20 ENCOUNTER — TELEPHONE (OUTPATIENT)
Dept: INTERNAL MEDICINE | Facility: CLINIC | Age: 69
End: 2024-12-20
Payer: MEDICARE

## 2024-12-20 NOTE — TELEPHONE ENCOUNTER
----- Message from Alia Pedro sent at 12/20/2024 11:22 AM EST -----  Abdominal ultrasound shows a stable pancreatic cyst.  Moderate diffuse fatty liver is also noted.  There is a 16mm cyst on the right kidney, along with mild fluid around this kidney.  Is she still following with urology?

## 2024-12-20 NOTE — TELEPHONE ENCOUNTER
Patient has been notified and verbalized understanding.  She does see urology once a year.  She has already seen them for 2024.  If she needs to make an appointment, please let her know.

## 2025-01-14 RX ORDER — ROSUVASTATIN CALCIUM 40 MG/1
TABLET, COATED ORAL
Qty: 90 TABLET | Refills: 1 | OUTPATIENT
Start: 2025-01-14

## 2025-01-14 NOTE — TELEPHONE ENCOUNTER
90 day supply of medication sent 10/7/2024 with 1 RF. Patient should have enough to last until appt as scheduled in February.

## 2025-02-04 LAB
NCCN CRITERIA FLAG: ABNORMAL
TYRER CUZICK SCORE: 4.1

## 2025-02-07 ENCOUNTER — DOCUMENTATION (OUTPATIENT)
Dept: GENETICS | Facility: HOSPITAL | Age: 70
End: 2025-02-07
Payer: MEDICARE

## 2025-02-07 NOTE — PROGRESS NOTES
This patient recently completed the CARE risk assessment for a mammogram appointment. Based on the patient's responses, NCCN criteria for genetic testing was met.     Navigator follow-up:     Attempts to reach the patient to discuss the risk assessment results have been unsuccessful.

## 2025-02-19 ENCOUNTER — HOSPITAL ENCOUNTER (OUTPATIENT)
Dept: MAMMOGRAPHY | Facility: HOSPITAL | Age: 70
Discharge: HOME | End: 2025-02-19
Admitting: NURSE PRACTITIONER
Payer: MEDICARE

## 2025-02-19 DIAGNOSIS — Z12.31 ENCOUNTER FOR SCREENING MAMMOGRAM FOR MALIGNANT NEOPLASM OF BREAST: ICD-10-CM

## 2025-02-19 PROCEDURE — 77063 BREAST TOMOSYNTHESIS BI: CPT

## 2025-02-19 PROCEDURE — 77067 SCR MAMMO BI INCL CAD: CPT

## 2025-02-27 ENCOUNTER — OFFICE VISIT (OUTPATIENT)
Dept: INTERNAL MEDICINE | Facility: CLINIC | Age: 70
End: 2025-02-27
Payer: MEDICARE

## 2025-02-27 ENCOUNTER — LAB (OUTPATIENT)
Dept: INTERNAL MEDICINE | Facility: CLINIC | Age: 70
End: 2025-02-27
Payer: MEDICARE

## 2025-02-27 VITALS
DIASTOLIC BLOOD PRESSURE: 68 MMHG | SYSTOLIC BLOOD PRESSURE: 116 MMHG | WEIGHT: 127.8 LBS | BODY MASS INDEX: 26.83 KG/M2 | TEMPERATURE: 98.7 F | HEART RATE: 80 BPM | HEIGHT: 58 IN | RESPIRATION RATE: 16 BRPM | OXYGEN SATURATION: 99 %

## 2025-02-27 DIAGNOSIS — E55.9 VITAMIN D DEFICIENCY: ICD-10-CM

## 2025-02-27 DIAGNOSIS — Z95.2 STATUS POST AORTIC VALVE REPLACEMENT: ICD-10-CM

## 2025-02-27 DIAGNOSIS — I10 ESSENTIAL HYPERTENSION: Primary | ICD-10-CM

## 2025-02-27 DIAGNOSIS — D84.821 IMMUNOSUPPRESSION DUE TO DRUG THERAPY: ICD-10-CM

## 2025-02-27 DIAGNOSIS — E78.2 MIXED HYPERLIPIDEMIA: ICD-10-CM

## 2025-02-27 DIAGNOSIS — K86.2 PANCREATIC CYST: ICD-10-CM

## 2025-02-27 DIAGNOSIS — M11.20 CALCIUM PYROPHOSPHATE DEPOSITION DISEASE (CPPD): ICD-10-CM

## 2025-02-27 DIAGNOSIS — I10 ESSENTIAL HYPERTENSION: ICD-10-CM

## 2025-02-27 DIAGNOSIS — Z79.1 NSAID LONG-TERM USE: ICD-10-CM

## 2025-02-27 DIAGNOSIS — M11.20 CALCIUM PYROPHOSPHATE DEPOSITION DISEASE: ICD-10-CM

## 2025-02-27 DIAGNOSIS — R74.8 ELEVATED LIVER ENZYMES: ICD-10-CM

## 2025-02-27 DIAGNOSIS — R94.5 ABNORMAL RESULTS OF LIVER FUNCTION STUDIES: ICD-10-CM

## 2025-02-27 DIAGNOSIS — Z23 NEED FOR PNEUMOCOCCAL 20-VALENT CONJUGATE VACCINATION: ICD-10-CM

## 2025-02-27 DIAGNOSIS — Z79.899 HIGH RISK MEDICATION USE: ICD-10-CM

## 2025-02-27 DIAGNOSIS — Z79.899 IMMUNOSUPPRESSION DUE TO DRUG THERAPY: ICD-10-CM

## 2025-02-27 DIAGNOSIS — D84.9 IMMUNOSUPPRESSION: ICD-10-CM

## 2025-02-27 LAB
25(OH)D3 SERPL-MCNC: 57.3 NG/ML (ref 30–100)
ALBUMIN SERPL-MCNC: 4.3 G/DL (ref 3.5–5.2)
ALBUMIN/GLOB SERPL: 1.3 G/DL
ALP SERPL-CCNC: 96 U/L (ref 39–117)
ALT SERPL W P-5'-P-CCNC: 25 U/L (ref 1–33)
ANION GAP SERPL CALCULATED.3IONS-SCNC: 11.6 MMOL/L (ref 5–15)
AST SERPL-CCNC: 43 U/L (ref 1–32)
BASOPHILS # BLD AUTO: 0.08 10*3/MM3 (ref 0–0.2)
BASOPHILS NFR BLD AUTO: 1.4 % (ref 0–1.5)
BILIRUB CONJ SERPL-MCNC: 0.3 MG/DL (ref 0–0.3)
BILIRUB SERPL-MCNC: 0.8 MG/DL (ref 0–1.2)
BUN SERPL-MCNC: 10 MG/DL (ref 8–23)
BUN/CREAT SERPL: 11.9 (ref 7–25)
CALCIUM SPEC-SCNC: 9.6 MG/DL (ref 8.6–10.5)
CHLORIDE SERPL-SCNC: 103 MMOL/L (ref 98–107)
CHOLEST SERPL-MCNC: 96 MG/DL (ref 0–200)
CO2 SERPL-SCNC: 21.4 MMOL/L (ref 22–29)
CREAT SERPL-MCNC: 0.84 MG/DL (ref 0.57–1)
CRP SERPL-MCNC: <0.3 MG/DL (ref 0–0.5)
DEPRECATED RDW RBC AUTO: 47 FL (ref 37–54)
EGFRCR SERPLBLD CKD-EPI 2021: 75.8 ML/MIN/1.73
EOSINOPHIL # BLD AUTO: 0.18 10*3/MM3 (ref 0–0.4)
EOSINOPHIL NFR BLD AUTO: 3.1 % (ref 0.3–6.2)
ERYTHROCYTE [DISTWIDTH] IN BLOOD BY AUTOMATED COUNT: 13.1 % (ref 12.3–15.4)
ERYTHROCYTE [SEDIMENTATION RATE] IN BLOOD: 21 MM/HR (ref 0–30)
GLOBULIN UR ELPH-MCNC: 3.4 GM/DL
GLUCOSE SERPL-MCNC: 95 MG/DL (ref 65–99)
HAV IGM SERPL QL IA: NORMAL
HBV CORE IGM SERPL QL IA: NORMAL
HBV SURFACE AG SERPL QL IA: NORMAL
HCT VFR BLD AUTO: 41.5 % (ref 34–46.6)
HCV AB SER QL: NORMAL
HDLC SERPL-MCNC: 28 MG/DL (ref 40–60)
HGB BLD-MCNC: 13.8 G/DL (ref 12–15.9)
IMM GRANULOCYTES # BLD AUTO: 0 10*3/MM3 (ref 0–0.05)
IMM GRANULOCYTES NFR BLD AUTO: 0 % (ref 0–0.5)
LDLC SERPL CALC-MCNC: 52 MG/DL (ref 0–100)
LDLC/HDLC SERPL: 1.89 {RATIO}
LYMPHOCYTES # BLD AUTO: 2.04 10*3/MM3 (ref 0.7–3.1)
LYMPHOCYTES NFR BLD AUTO: 34.6 % (ref 19.6–45.3)
MCH RBC QN AUTO: 32.6 PG (ref 26.6–33)
MCHC RBC AUTO-ENTMCNC: 33.3 G/DL (ref 31.5–35.7)
MCV RBC AUTO: 98.1 FL (ref 79–97)
MONOCYTES # BLD AUTO: 0.41 10*3/MM3 (ref 0.1–0.9)
MONOCYTES NFR BLD AUTO: 6.9 % (ref 5–12)
NEUTROPHILS NFR BLD AUTO: 3.19 10*3/MM3 (ref 1.7–7)
NEUTROPHILS NFR BLD AUTO: 54 % (ref 42.7–76)
NRBC BLD AUTO-RTO: 0 /100 WBC (ref 0–0.2)
PLATELET # BLD AUTO: 203 10*3/MM3 (ref 140–450)
PMV BLD AUTO: 11.7 FL (ref 6–12)
POTASSIUM SERPL-SCNC: 4 MMOL/L (ref 3.5–5.2)
PROT SERPL-MCNC: 7.7 G/DL (ref 6–8.5)
RBC # BLD AUTO: 4.23 10*6/MM3 (ref 3.77–5.28)
SODIUM SERPL-SCNC: 136 MMOL/L (ref 136–145)
TRIGL SERPL-MCNC: 75 MG/DL (ref 0–150)
TSH SERPL DL<=0.05 MIU/L-ACNC: 0.76 UIU/ML (ref 0.27–4.2)
VIT B12 BLD-MCNC: 776 PG/ML (ref 211–946)
VLDLC SERPL-MCNC: 16 MG/DL (ref 5–40)
WBC NRBC COR # BLD AUTO: 5.9 10*3/MM3 (ref 3.4–10.8)

## 2025-02-27 PROCEDURE — 86140 C-REACTIVE PROTEIN: CPT | Performed by: INTERNAL MEDICINE

## 2025-02-27 PROCEDURE — 84443 ASSAY THYROID STIM HORMONE: CPT | Performed by: INTERNAL MEDICINE

## 2025-02-27 PROCEDURE — 82248 BILIRUBIN DIRECT: CPT | Performed by: INTERNAL MEDICINE

## 2025-02-27 PROCEDURE — 80053 COMPREHEN METABOLIC PANEL: CPT | Performed by: INTERNAL MEDICINE

## 2025-02-27 PROCEDURE — 80074 ACUTE HEPATITIS PANEL: CPT | Performed by: NURSE PRACTITIONER

## 2025-02-27 PROCEDURE — 85025 COMPLETE CBC W/AUTO DIFF WBC: CPT | Performed by: INTERNAL MEDICINE

## 2025-02-27 PROCEDURE — 82607 VITAMIN B-12: CPT | Performed by: NURSE PRACTITIONER

## 2025-02-27 PROCEDURE — 82306 VITAMIN D 25 HYDROXY: CPT | Performed by: NURSE PRACTITIONER

## 2025-02-27 PROCEDURE — 85652 RBC SED RATE AUTOMATED: CPT | Performed by: INTERNAL MEDICINE

## 2025-02-27 PROCEDURE — 80061 LIPID PANEL: CPT | Performed by: INTERNAL MEDICINE

## 2025-02-27 RX ORDER — ROSUVASTATIN CALCIUM 40 MG/1
40 TABLET, COATED ORAL DAILY
Qty: 90 TABLET | Refills: 1 | Status: SHIPPED | OUTPATIENT
Start: 2025-02-27

## 2025-02-27 RX ORDER — BENAZEPRIL HYDROCHLORIDE 40 MG/1
40 TABLET ORAL DAILY
Qty: 90 TABLET | Refills: 1 | Status: SHIPPED | OUTPATIENT
Start: 2025-02-27

## 2025-02-27 NOTE — PROGRESS NOTES
Subjective   Greta Marino is a 69 y.o. female    Chief Complaint   Patient presents with    Hypertension     History of Present Illness     HTN - has been consistently taking BP meds (nifedipine and benazepril) as directed.  BP looks great today.  Denies any medication SE's     HL - chronic; currently taking Crestor 40 mg daily  Lab Results   Component Value Date    CHOL 95 09/09/2024    CHLPL 123 03/24/2021    TRIG 103 11/21/2024    HDL 28 (L) 09/09/2024    LDL 50 09/09/2024     TAVR on 7/28/22 - procedure went well and DUNCAN has resolved.  She is now being seen annually     S/P Pyelolithotomy and Pyeloplasty on 11/7/2022 per Dr. Lopez due to renal calculi and obstruction.  Now followed yearly     Pancreatic cyst - followed by GI     Has been dealing with Chondrocalcinosis of the right wrist.  Has seen ortho and rheum.  She has been told this will be permanent.  She is still doing PT.  Now on Humira Q2 weeks.  Liver enzymes had been elevated.  Additional labs were sent at last visit and US was ordered.  Findings are concerning for PUGH so she has been referred to GI and has appt on 3/19/25     COVID -3/31/2021, 4/23/2021, and 1/30/2022, 10/29/22, 10/2024  Tdap - 2015  Flu shot -10/2024  Pneumovax 23 - 4/4/22  Prevnar - updating today  Mamm - 2/2025  Colon- 7/8/2024  DEXA - 7/9/2024          The following portions of the patient's history were reviewed and updated as appropriate: allergies, current medications, past family history, past medical history, past social history, past surgical history, and problem list.    Current Outpatient Medications:     alendronate (FOSAMAX) 10 MG tablet, Take 1 tablet by mouth Every Morning Before Breakfast., Disp: 90 tablet, Rfl: 1    aspirin 81 MG EC tablet, Take 1 tablet by mouth Daily., Disp: 100 tablet, Rfl: 3    benazepril (LOTENSIN) 40 MG tablet, Take 1 tablet by mouth Daily., Disp: 90 tablet, Rfl: 1    Cholecalciferol (Vitamin D3) 10 MCG (400 UNIT) capsule, Take 4,000  "Units by mouth Daily., Disp: , Rfl:     NIFEdipine CC (ADALAT CC) 60 MG 24 hr tablet, TAKE 1 TABLET BY MOUTH DAILY, Disp: 90 tablet, Rfl: 1    rosuvastatin (CRESTOR) 40 MG tablet, Take 1 tablet by mouth Daily., Disp: 90 tablet, Rfl: 1     Review of Systems   Constitutional:  Negative for chills, fatigue and fever.   Respiratory:  Negative for cough, chest tightness and shortness of breath.    Cardiovascular:  Negative for chest pain.   Gastrointestinal:  Negative for abdominal pain, diarrhea, nausea and vomiting.   Endocrine: Negative for cold intolerance and heat intolerance.   Musculoskeletal:  Negative for arthralgias.   Neurological:  Negative for dizziness.       Objective   Physical Exam  Constitutional:       Appearance: She is well-developed.   HENT:      Head: Normocephalic and atraumatic.   Eyes:      Conjunctiva/sclera: Conjunctivae normal.      Pupils: Pupils are equal, round, and reactive to light.   Cardiovascular:      Rate and Rhythm: Normal rate and regular rhythm.      Heart sounds: Normal heart sounds.   Pulmonary:      Effort: Pulmonary effort is normal.      Breath sounds: Normal breath sounds.   Abdominal:      General: Bowel sounds are normal.      Palpations: Abdomen is soft.   Musculoskeletal:         General: Normal range of motion.      Cervical back: Normal range of motion.   Skin:     General: Skin is warm and dry.   Neurological:      Mental Status: She is alert and oriented to person, place, and time.      Deep Tendon Reflexes: Reflexes are normal and symmetric.   Psychiatric:         Behavior: Behavior normal.         Thought Content: Thought content normal.         Judgment: Judgment normal.       Vitals:    02/27/25 0922   BP: 116/68   BP Location: Right arm   Patient Position: Sitting   Cuff Size: Adult   Pulse: 80   Resp: 16   Temp: 98.7 °F (37.1 °C)   TempSrc: Temporal   SpO2: 99%   Weight: 58 kg (127 lb 12.8 oz)   Height: 147.3 cm (57.99\")         Assessment & Plan   Diagnoses " and all orders for this visit:    1. Essential hypertension (Primary)  -     benazepril (LOTENSIN) 40 MG tablet; Take 1 tablet by mouth Daily.  Dispense: 90 tablet; Refill: 1  -     CBC & Differential; Future  -     Comprehensive Metabolic Panel; Future  -     Lipid Panel; Future  -     TSH; Future  -     Vitamin B12; Future  -     Vitamin D,25-Hydroxy; Future    2. Mixed hyperlipidemia  -     rosuvastatin (CRESTOR) 40 MG tablet; Take 1 tablet by mouth Daily.  Dispense: 90 tablet; Refill: 1  -     benazepril (LOTENSIN) 40 MG tablet; Take 1 tablet by mouth Daily.  Dispense: 90 tablet; Refill: 1  -     CBC & Differential; Future  -     Comprehensive Metabolic Panel; Future  -     Lipid Panel; Future  -     TSH; Future  -     Vitamin B12; Future  -     Vitamin D,25-Hydroxy; Future    3. Elevated liver enzymes  -     CBC & Differential; Future  -     Comprehensive Metabolic Panel; Future  -     Lipid Panel; Future  -     TSH; Future  -     Vitamin B12; Future  -     Vitamin D,25-Hydroxy; Future    4. Vitamin D deficiency  -     CBC & Differential; Future  -     Comprehensive Metabolic Panel; Future  -     Lipid Panel; Future  -     TSH; Future  -     Vitamin B12; Future  -     Vitamin D,25-Hydroxy; Future    5. Pancreatic cyst  -     CBC & Differential; Future  -     Comprehensive Metabolic Panel; Future  -     Lipid Panel; Future  -     TSH; Future  -     Vitamin B12; Future  -     Vitamin D,25-Hydroxy; Future    6. Need for pneumococcal 20-valent conjugate vaccination  -     Pneumococcal Conjugate Vaccine 20-Valent (PCV20)      Labs sent  Meds refilled w/o changes  Keep appt scheduled with GI   Prevnar updated  Return in about 6 months (around 8/27/2025) for Medicare Wellness, collect labs today.

## 2025-02-27 NOTE — LETTER
[facility]  Vaccine Consent Form    Patient Name:  Greta Marino  Patient :  1955     Vaccine(s) Ordered    Pneumococcal Conjugate Vaccine 20-Valent (PCV20)        Screening Checklist  The following questions should be completed prior to vaccination. If you answer “yes” to any question, it does not necessarily mean you should not be vaccinated. It just means we may need to clarify or ask more questions. If a question is unclear, please ask your healthcare provider to explain it.    Yes No   Any fever or moderate to severe illness today (mild illness and/or antibiotic treatment are not contraindications)?     Do you have a history of a serious reaction to any previous vaccinations, such as anaphylaxis, encephalopathy within 7 days, Guillain-[place] syndrome within 6 weeks, seizure?     Have you received any live vaccine(s) (e.g MMR, TARAN) or any other vaccines in the last month (to ensure duplicate doses aren't given)?     Do you have an anaphylactic allergy to latex (DTaP, DTaP-IPV, Hep A, Hep B, MenB, RV, Td, Tdap), baker’s yeast (Hep B, HPV), polysorbates (RSV, nirsevimab, PCV 20, Rotavirrus, Tdap, Shingrix), or gelatin (TARAN, MMR)?     Do you have an anaphylactic allergy to neomycin (Rabies, TARAN, MMR, IPV, Hep A), polymyxin B (IPV), or streptomycin (IPV)?      Any cancer, leukemia, AIDS, or other immune system disorder? (TARAN, MMR, RV)     Do you have a parent, brother, or sister with an immune system problem (if immune competence of vaccine recipient clinically verified, can proceed)? (MMR, TARAN)     Any recent steroid treatments for >2 weeks, chemotherapy, or radiation treatment? (TARAN, MMR)     Have you received antibody-containing blood transfusions or IVIG in the past 11 months (recommended interval is dependent on product)? (MMR, TARAN)     Have you taken antiviral drugs (acyclovir, famciclovir, valacyclovir for TARAN) in the last 24 or 48 hours, respectively?      Are you pregnant or planning to become  "pregnant within 1 month? (TARAN, MMR, HPV, IPV, MenB, Abrexvy; For Hep B- refer to Engerix-B; For RSV - Abrysvo is indicated for 32-36 weeks of pregnancy from September to January)     For infants, have you ever been told your child has had intussusception or a medical emergency involving obstruction of the intestine (Rotavirus)? If not for an infant, can skip this question.         *Ordering Physicians/APC should be consulted if \"yes\" is checked by the patient or guardian above.  I have received, read, and understand the Vaccine Information Statement (VIS) for each vaccine ordered.  I have considered my or my child's health status as well as the health status of my close contacts.  I have taken the opportunity to discuss my vaccine questions with my or my child's health care provider.   I have requested that the ordered vaccine(s) be given to me or my child.  I understand the benefits and risks of the vaccines.  I understand that I should remain in the clinic for 15 minutes after receiving the vaccine(s).  _________________________________________________________  Signature of Patient or Parent/Legal Guardian ____________________  Date   "

## 2025-03-11 ENCOUNTER — OFFICE VISIT (OUTPATIENT)
Age: 70
End: 2025-03-11
Payer: MEDICARE

## 2025-03-11 VITALS
TEMPERATURE: 97.5 F | HEIGHT: 58 IN | SYSTOLIC BLOOD PRESSURE: 130 MMHG | HEART RATE: 95 BPM | BODY MASS INDEX: 27.22 KG/M2 | WEIGHT: 129.7 LBS | DIASTOLIC BLOOD PRESSURE: 84 MMHG

## 2025-03-11 DIAGNOSIS — E78.2 MIXED HYPERLIPIDEMIA: ICD-10-CM

## 2025-03-11 DIAGNOSIS — M15.9 GENERALIZED OSTEOARTHROSIS, INVOLVING MULTIPLE SITES: ICD-10-CM

## 2025-03-11 DIAGNOSIS — I10 ESSENTIAL HYPERTENSION: ICD-10-CM

## 2025-03-11 DIAGNOSIS — M11.20 CALCIUM PYROPHOSPHATE DEPOSITION DISEASE (CPPD): Primary | ICD-10-CM

## 2025-03-11 DIAGNOSIS — Z95.2 STATUS POST AORTIC VALVE REPLACEMENT: ICD-10-CM

## 2025-03-11 DIAGNOSIS — Z79.899 HIGH RISK MEDICATION USE: ICD-10-CM

## 2025-03-11 DIAGNOSIS — Z79.1 NSAID LONG-TERM USE: ICD-10-CM

## 2025-03-11 PROCEDURE — 3079F DIAST BP 80-89 MM HG: CPT | Performed by: INTERNAL MEDICINE

## 2025-03-11 PROCEDURE — 99214 OFFICE O/P EST MOD 30 MIN: CPT | Performed by: INTERNAL MEDICINE

## 2025-03-11 PROCEDURE — 3075F SYST BP GE 130 - 139MM HG: CPT | Performed by: INTERNAL MEDICINE

## 2025-03-11 PROCEDURE — 1160F RVW MEDS BY RX/DR IN RCRD: CPT | Performed by: INTERNAL MEDICINE

## 2025-03-11 PROCEDURE — G2211 COMPLEX E/M VISIT ADD ON: HCPCS | Performed by: INTERNAL MEDICINE

## 2025-03-11 PROCEDURE — 1159F MED LIST DOCD IN RCRD: CPT | Performed by: INTERNAL MEDICINE

## 2025-03-11 RX ORDER — MELOXICAM 7.5 MG/1
7.5 TABLET ORAL DAILY PRN
Qty: 30 TABLET | Refills: 5 | Status: SHIPPED | OUTPATIENT
Start: 2025-03-11

## 2025-03-11 RX ORDER — BENAZEPRIL HYDROCHLORIDE 40 MG/1
40 TABLET ORAL DAILY
Qty: 90 TABLET | Refills: 1 | OUTPATIENT
Start: 2025-03-11

## 2025-03-11 RX ORDER — ALENDRONATE SODIUM 70 MG/1
70 TABLET ORAL
Qty: 4 TABLET | Refills: 11 | Status: SHIPPED | OUTPATIENT
Start: 2025-03-11

## 2025-03-11 NOTE — PROGRESS NOTES
Office Follow Up      Date: 03/11/2025   Patient Name: Greta Marino  MRN: [id number]  YOB: 1955    Referring Physician: No ref. provider found     Chief Complaint:   Chief Complaint   Patient presents with    Calcium pyrophosphate deposition disease (CPPD)       History of Present Illness: Greta Marino is a 69 y.o. female who is here today for follow up on right wrist swelling and pain, pseudogout/CPPD right wrist and Osteoarthritis     History:  Initial consult 8/17/23:  68-year-old  female with history of generalized osteoarthritis, aortic valve replacement 7/22 seen today in consultation from [facility] Dr. Mendez for new sudden onset pain, redness, warmth, swelling right wrist starting 7/27/23.  She denies any injury or fall.  Her right wrist just started aching and swelling for no reason one evening in late July.  She has never had anything like this before.  No history of gout.  No fevers.  No other joints seem to be involved beyond the right wrist and dorsum of the right hand.  It is hard to use her right hand presently.  No troubles with the left hand beyond her chronic osteoarthritis.    X-ray right wrist 7/27/23 reportedly showed no fracture, advanced degenerative joint disease, positive chondrocalcinosis, soft tissue swelling wrist.  Labs have shown negative WEN, negative rheumatoid factor, elevated inflammatory markers CRP/sed rate     There is suspicion for pseudogout right wrist.  She was treated with prednisone taper by her PCP starting 8/1/23.  This did not seem to help much.  She was then treated with naproxen followed by celecoxib which she currently is taking b.i.d..  She is currently doing occupational therapy.  She consulted with [facility] Dr. Mendez 8/10/23 who referred her on to our Rheumatology Clinic due to concern for inflammatory arthritis.     She has no history of psoriasis, rheumatoid arthritis, lupus or  systemic inflammatory rheumatic disease  Denies Raynaud's, malar rash, oral nasal ulcers, pleurisy/pericarditis, renal/hematologic abnormalities, clotting disorder, seizure disorder, iritis.     Follow-up 8/31/23:  There is some slight improvement in the right wrist after steroid taper.  She continues on colchicine once daily.  She is not taking Celebrex often.  Swelling is somewhat down.  Still quite swollen and hard to bend.  Doing hand therapy.  No other joints involved.  No fevers night sweats weight loss     Follow-up 9/26/23:  She reports the right wrist is gradually improving.  No pain in the right wrist any longer.  Right wrist is still swollen and cannot make a fist with the right hand.  Continues hand therapy.  No side effects to methotrexate.  Well tolerated and seems to be gradually helping.  Continues celecoxib and diclofenac gel.     Follow-up 11/30/23:  Right wrist continues to gradually improve.  Still some swelling to the right wrist but no pain in the wrist any longer unless she works at therapy.  No side effects to methotrexate.  She thinks it helps her wrist overall.  Just taking Celebrex and diclofenac as needed.  Continues hand therapy     Follow-up 1/12/24:  Not doing well.  Right wrist remains swollen and mildly painful.  She still cannot bend her right hand fingers to make a fist.  She continues hand therapy.  Improvement right wrist pain/swelling on methotrexate seems to have plateaued.  The right wrist is definitely less swollen and less painful than it was when it initially started July 2023. Methotrexate has helped somewhat.  Still decreased function with movement right wrist and right fingers.  She was offered surgery but [facility] on the wrist, but they do not think it would improve her range of motion but could improve pain.  She reports the pain is not as concerning to her as it is relatively mild.  Her main concern is decreased range of motion right wrist and fingers.      Follow-up 4/9/24:  Improved.  Right wrist swelling and pain has resolved on methotrexate.  She is concerned that LFTs have med mildly elevated the past 2 sets of labs.  She just got approved for Humira assistance program but has not actually received Humira yet.  She wants to proceed with the Humira and consider stopping the methotrexate given LFT elevation.  No motion in the right wrist but no longer swollen or painful     Follow-up 11/11/2024:: She has been doing very well.  She started on Humira April 2024 and has been doing well.  No side effect.  No serious infection.  No flares or pain in the right wrist.  No swelling.  Remains off methotrexate.  LFTs have improved.  Joints are stiff for a few minutes when she wakes in the morning.    Follow-up 3/12/2025: She has been doing well.  She stopped Humira January 2025 and has been doing fine without it.  No side effects but she just did not think she needed.  No further swelling or inflammation in the right wrist.       History of Present Illness        Subjective       Review of Systems: Review of Systems   Constitutional:  Negative for chills, fatigue, fever and unexpected weight loss.   HENT:  Negative for mouth sores, sinus pressure and sore throat.    Eyes:  Negative for pain and redness.   Respiratory:  Negative for cough and shortness of breath.    Cardiovascular:  Negative for chest pain.   Gastrointestinal:  Negative for abdominal pain, blood in stool, diarrhea, nausea, vomiting and GERD.   Endocrine: Negative for polydipsia and polyuria.   Genitourinary:  Negative for dysuria, genital sores and hematuria.   Musculoskeletal:  Negative for arthralgias, back pain, joint swelling, myalgias, neck pain and neck stiffness.   Skin:  Negative for rash and bruise.   Neurological:  Negative for seizures, weakness, numbness and memory problem.   Hematological:  Negative for adenopathy. Does not bruise/bleed easily.   Psychiatric/Behavioral:  Negative for depressed  mood. The patient is not nervous/anxious.         Past Medical History:   Past Medical History:   Diagnosis Date    Allergic 1955    As a child    Arthritis     Arthritis of back 2023    Asthma     Colon polyp 2022    Condition not found     CPPD RIGHT WRIST    Coronary artery disease     Elevated lipids     Fracture     CLOSED FRACTURE OF LEFT ANKLE    Fracture of ankle 1998    Heart murmur     Heart valve disease     aortic valve replaced - 2022    History of mammography, screening 2017    Done in [place]     History of Papanicolaou smear of cervix 2017    Done in [place]    History of shingles 2015    History of transcatheter aortic valve replacement (TAVR) 08/17/2022    Hyperlipidemia     Hypertension     Kidney stone     Kidney stones     Knee swelling 2018    Non-functioning kidney     RIGHT    Osteoarthritis     Osteopenia 2023    Pancreatic cyst     Pneumonia 1985    Tear of meniscus of knee 2018    Wears glasses     Wrist pain     R    Wrist sprain 2023       Past Surgical History:   Past Surgical History:   Procedure Laterality Date    ANKLE OPEN REDUCTION INTERNAL FIXATION  1998    ANKLE SURGERY Left 1980's    ORIF    AORTIC VALVE REPAIR/REPLACEMENT N/A 07/28/2022    Procedure: TRANSCATHETER AORTIC VALVE REPLACEMENT;  Surgeon: Jaylan Mckay MD;  Location:  [facility];  Service: Cardiothoracic;  Laterality: N/A;  flouro 150  dose 16ML  contrast 102 MgY    AORTIC VALVE REPAIR/REPLACEMENT N/A 07/28/2022    Procedure: Transfemoral Transcatheter Aortic Valve Replacement;  Surgeon: Dorian Camara MD;  Location:  [facility];  Service: Cardiovascular;  Laterality: N/A;    BREAST BIOPSY Right 1995    CARDIAC CATHETERIZATION Left 06/30/2022    Procedure: Left Heart Cath;  Surgeon: Jaylan Redd MD;  Location:  [facility];  Service: Cardiovascular;  Laterality: Left;  to be scheduled in next 1-2 months    CARDIAC VALVE REPLACEMENT      COLONOSCOPY  8/24/2020, 2005    8/24/2020-  Dr. Bowling. 2005-Dr. Spence in [place], [place]    CYSTOSCOPY  2022    CYSTOSCOPY W/ URETERAL STENT PLACEMENT Right 11/07/2022    Procedure: CYSTOSCOPY URETERAL CATHETER/STENT INSERTION;  Surgeon: Shahid Lopez MD;  Location:  [facility];  Service: [facility] - [facility];  Laterality: Right;    CYSTOSCOPY, URETEROSCOPY, RETROGRADE PYELOGRAM, STONE EXTRACTION, STENT INSERTION Right 09/16/2022    Procedure: URETEROSCOPY, RETROGRADE PYELOGRAM, STENT INSERTION - RIGHT;  Surgeon: Shahid Lopez MD;  Location:  [facility];  Service: Urology;  Laterality: Right;    KIDNEY STONE SURGERY  2022    KNEE SURGERY Right 12/20/2018    meniscus repair    PYELOLITHOTOMY Right 11/07/2022    Procedure: PYELOLITHOTOMY LAPAROSCOPIC WITH DAVINCI ROBOT;  Surgeon: Shahid Lopez MD;  Location:  [facility];  Service: Robotics - Da[facility]i;  Laterality: Right;    PYELOPLASTY Right 11/07/2022    Procedure: PYELOPLASTY LAPAROSCOPIC WITH DAVINCI ROBOT;  Surgeon: Shahid Lopez MD;  Location:  [facility];  Service: Robotics - DaVinci;  Laterality: Right;    TRANSESOPHAGEAL ECHOCARDIOGRAM (SOURAV)      TRANSESOPHAGEAL ECHOCARDIOGRAM (SOURAV) N/A 07/28/2022    Procedure: TRANSESOPHAGEAL ECHOCARDIOGRAM;  Surgeon: Jaylan Mckay MD;  Location: [facility];  Service: Cardiothoracic;  Laterality: N/A;       Family History:   Family History   Problem Relation Age of Onset    Pancreatic cancer Mother         Passed away in 2002    Arthritis Mother     Cancer Mother         Pancreatic cancer    Diabetes Father         Type 2    Heart attack Father         passed in 2000    Hypertension Father     Colon cancer Father     Cancer Father         Colon cancer    Sudden death Father     Osteoporosis Sister     Arthritis Sister     Diabetes Brother         Type 2    Hypertension Brother     Hypertension Brother     Stroke Brother     Sarcoidosis Daughter     Breast cancer Neg Hx     Ovarian cancer Neg Hx        Social History:   Social History     Socioeconomic  "History    Marital status:     Number of children: 2    Highest education level: High school graduate   Tobacco Use    Smoking status: Never     Passive exposure: Never    Smokeless tobacco: Never   Vaping Use    Vaping status: Never Used   Substance and Sexual Activity    Alcohol use: Not Currently     Comment: 1-2 monthly if that    Drug use: Never    Sexual activity: Not Currently     Partners: Male     Birth control/protection: Spermicide, Birth control pill     Comment:        Medications:   Current Outpatient Medications:     aspirin 81 MG EC tablet, Take 1 tablet by mouth Daily., Disp: 100 tablet, Rfl: 3    benazepril (LOTENSIN) 40 MG tablet, Take 1 tablet by mouth Daily., Disp: 90 tablet, Rfl: 1    Cholecalciferol (Vitamin D3) 10 MCG (400 UNIT) capsule, Take 4,000 Units by mouth Daily., Disp: , Rfl:     NIFEdipine CC (ADALAT CC) 60 MG 24 hr tablet, TAKE 1 TABLET BY MOUTH DAILY, Disp: 90 tablet, Rfl: 1    rosuvastatin (CRESTOR) 40 MG tablet, Take 1 tablet by mouth Daily., Disp: 90 tablet, Rfl: 1    alendronate (FOSAMAX) 70 MG tablet, Take 1 tablet by mouth Every 7 (Seven) Days., Disp: 4 tablet, Rfl: 11    meloxicam (MOBIC) 7.5 MG tablet, Take 1 tablet by mouth Daily As Needed for Mild Pain., Disp: 30 tablet, Rfl: 5    Allergies:   Allergies   Allergen Reactions    Peanut-Containing Drug Products Hives, Shortness Of Breath and Swelling           Objective        Vital Signs:   Vitals:    03/11/25 1426   BP: 130/84   BP Location: Left arm   Patient Position: Sitting   Cuff Size: Adult   Pulse: 95   Temp: 97.5 °F (36.4 °C)   Weight: 58.8 kg (129 lb 11.2 oz)   Height: 147.3 cm (57.99\")   PainSc: 3      Body mass index is 27.11 kg/m².      Physical Exam:  Physical Exam   MUSCULOSKELETAL:   No peripheral synovitis  Right wrist is fused with no range of motion.  No tenderness.  No warmth or effusion.    Complete joint exam was performed including the MCPs, PIPs, DIPs of the hands, wrists, elbows, " "shoulders, hips, knees and ankles.  No soft tissue swelling or tenderness is present except as above.    General: The patient is well-developed and well nourished. Cooperative, alert and oriented. Affect is normal. Hydration appears normal.   HEENT: Normocephalic and atraumatic. Lids and conjunctiva are normal. Pupils are equal and sclera are clear. Oropharynx is clear   NECK neck is supple without adenopathy, masses or thyromegaly.   CARDIOVASCULAR: Regular rate and rhythm. No murmurs, rubs or gallops   LUNGS: Effort is normal. Lungs are clear bilateral   ABDOMEN: Not examined  EXTREMITIES: Peripheral pulses are intact. No clubbing.   SKIN: No rashes. No subcutaneous nodules. No digital ulcers. No sclerodactyly.   NEUROLOGIC: Gait is normal. Strength testing is normal.  No focal neurologic deficits    Results Review:   Labs:   Lab Results   Component Value Date    GLUCOSE 95 02/27/2025    BUN 10 02/27/2025    CREATININE 0.84 02/27/2025    EGFR 75.8 02/27/2025    BCR 11.9 02/27/2025    K 4.0 02/27/2025    CO2 21.4 (L) 02/27/2025    CALCIUM 9.6 02/27/2025    ALBUMIN 4.3 02/27/2025    BILITOT 0.8 02/27/2025    AST 43 (H) 02/27/2025    ALT 25 02/27/2025     Lab Results   Component Value Date    WBC 5.90 02/27/2025    HGB 13.8 02/27/2025    HCT 41.5 02/27/2025    MCV 98.1 (H) 02/27/2025     02/27/2025     Lab Results   Component Value Date    SEDRATE 21 02/27/2025     Lab Results   Component Value Date    CRP <0.30 02/27/2025     Lab Results   Component Value Date    QUANTIFERO Incubation performed. 11/11/2024    QUANTIFERO Comment 11/11/2024    QUANTITB1 0.01 11/11/2024    QUANTITB2 0.01 11/11/2024    QUANTIFERN 0.01 11/11/2024    QUANTIFERM >10.00 11/11/2024    QUANTITBGLDP Negative 11/11/2024     No results found for: \"RF\"  Lab Results   Component Value Date    HEPBSAG Non-Reactive 02/27/2025    HEPAIGM Non-Reactive 02/27/2025    HEPBIGMCORE Non-Reactive 02/27/2025    HEPCVIRUSABY Non-Reactive 02/27/2025 "         Procedures    Assessment / Plan        -Calcium pyrophosphate deposition disease (CPPD) /pseudogout right wrist  Part-time .  .  Lives with son/daughter-in-law   Sudden onset right wrist pain swelling 7/27/23  Consult from [facility] Dr. Mendez 8/10/23  -X-ray right wrist 7/27/23:  Advanced degenerative joint disease, +chondrocalcinosis, no fracture, soft tissue swelling present  Labs 8/4/23:  Elevated CRP 4.14, elevated ESR 68, negative WEN, negative RF,  normal LFTs, normal creatinine, magnesium 2.2, calcium 10.1, uric acid 2.0  -Tried:  Occupational therapy, Naproxen, celecoxib, prednisone 8/1/23, prednisone taper starting 40 mg 8/17/23, colchicine  **Current:  diclofenac gel, celecoxib,  Prior methotrexate 8/31/23-4/24 (LFTs),  Humira 4/24-1/25(abbvie assist)     Sudden onset inflammatory monoarthritis right wrist 7/27/23.    + chondrocalcinosis right wrist on x-ray along with degenerative changes  Elevated ESR/CRP on labs.  Negative WEN.  Negative rheumatoid factor  No history of injury/wrist trauma.  No history of gout.  No fever, night sweats     Suspect pseudogout/CPPD right wrist 7/27/23 with evidence chondrocalcinosis supportive of pseudogout diagnosis on wrist x-ray as above.   Seronegative rheumatoid also a consideration in the differential.   Normal uric acid  No fracture.  Osteoarthritis noted on x-ray  Less likely gout with wrist involvement and no history of gout, normal uric acid.    Unlikely septic arthritis.  No clinical evidence of psoriatic arthritis, lupus or connective tissue disease  WEN negative        Overall right wrist inflammatory arthritis is resolved and improved  Low disease activity no longer on Humira which she stopped 1/25   -no swelling or tenderness to the wrist or hand or any peripheral joint today.    -Right wrist has minimal range of motion and has I suspect auto fused/OA.    Previously considered surgery with [facility] on the right  wrist, but they have told her while surgery would possibly help wrist pain , it would not improve her range of motion in the wrist.     -Remain off Humira and DMARDs and will monitor off immunosuppression as she is doing quite well clinically  Recommend alternating with meloxicam and diclofenac gel as needed  -Labs reviewed 2/27/2025 are stable, so no labs ordered today.    Return to clinic 6 months     -Generalized osteoarthrosis, involving multiple sites  -prn NSAID  Risks of NSAIDs discussed including GI upset, GI bleeding, renal and hepatic risks and the risks of cardiovascular disease and stroke. Warned patient not to take with other NSAIDs including OTC NSAIDs     -NSAID long-term use     -Status post aortic valve replacement  7/22 with Dr. Jaylan Mckay          No orders of the defined types were placed in this encounter.    New Medications Ordered This Visit   Medications    alendronate (FOSAMAX) 70 MG tablet     Sig: Take 1 tablet by mouth Every 7 (Seven) Days.     Dispense:  4 tablet     Refill:  11    meloxicam (MOBIC) 7.5 MG tablet     Sig: Take 1 tablet by mouth Daily As Needed for Mild Pain.     Dispense:  30 tablet     Refill:  5       Follow Up:   Return in about 6 months (around 9/11/2025).      Discussed plan of care in detail with the patient today.  Patient verbalized understanding and agrees.    I confirm accuracy of unchanged data/findings which have been carried forward from previous visit.  I have updated appropriately those that have changed.      Cleveland Felton MD  [facility]

## 2025-03-13 DIAGNOSIS — I10 ESSENTIAL HYPERTENSION: ICD-10-CM

## 2025-03-13 DIAGNOSIS — E78.2 MIXED HYPERLIPIDEMIA: ICD-10-CM

## 2025-03-13 RX ORDER — BENAZEPRIL HYDROCHLORIDE 40 MG/1
40 TABLET ORAL DAILY
Qty: 90 TABLET | Refills: 1 | OUTPATIENT
Start: 2025-03-13

## 2025-03-19 ENCOUNTER — TELEPHONE (OUTPATIENT)
Dept: GASTROENTEROLOGY | Facility: CLINIC | Age: 70
End: 2025-03-19

## 2025-03-19 NOTE — TELEPHONE ENCOUNTER
Name: Greta Marino    Relationship: Self    Best Callback Number: [phone]    Patient would like to Schedule a Follow-Up. Unable to schedule within the 3 week timeframe.     Patient is not having any symptoms. Please call patient. If not able to reach pt. It is okay to lvm.

## 2025-04-06 DIAGNOSIS — E78.2 MIXED HYPERLIPIDEMIA: ICD-10-CM

## 2025-04-06 DIAGNOSIS — I10 ESSENTIAL HYPERTENSION: ICD-10-CM

## 2025-06-02 ENCOUNTER — HOSPITAL ENCOUNTER (OUTPATIENT)
Dept: CARDIOLOGY | Facility: HOSPITAL | Age: 70
Discharge: HOME | End: 2025-06-02
Admitting: INTERNAL MEDICINE
Payer: MEDICARE

## 2025-06-02 VITALS — BODY MASS INDEX: 26.84 KG/M2 | HEIGHT: 58 IN | WEIGHT: 127.87 LBS

## 2025-06-02 DIAGNOSIS — Z95.2 HISTORY OF TRANSCATHETER AORTIC VALVE REPLACEMENT (TAVR): ICD-10-CM

## 2025-06-02 DIAGNOSIS — I35.9 AORTIC VALVE DISORDER: ICD-10-CM

## 2025-06-02 LAB
AORTIC DIMENSIONLESS INDEX: 0.56 (DI)
AV MEAN PRESS GRAD SYS DOP V1V2: 9 MMHG
AV VMAX SYS DOP: 207 CM/SEC
BH CV ECHO MEAS - AI P1/2T: 598.9 MSEC
BH CV ECHO MEAS - AO MAX PG: 17.2 MMHG
BH CV ECHO MEAS - AO ROOT DIAM: 3 CM
BH CV ECHO MEAS - AO V2 VTI: 38 CM
BH CV ECHO MEAS - AVA(I,D): 1.77 CM2
BH CV ECHO MEAS - EDV(CUBED): 79.5 ML
BH CV ECHO MEAS - EDV(MOD-SP2): 55.9 ML
BH CV ECHO MEAS - EDV(MOD-SP4): 47.4 ML
BH CV ECHO MEAS - EF(MOD-SP2): 69.2 %
BH CV ECHO MEAS - EF(MOD-SP4): 73.4 %
BH CV ECHO MEAS - ESV(CUBED): 15.6 ML
BH CV ECHO MEAS - ESV(MOD-SP2): 17.2 ML
BH CV ECHO MEAS - ESV(MOD-SP4): 12.6 ML
BH CV ECHO MEAS - FS: 41.9 %
BH CV ECHO MEAS - IVS/LVPW: 1.1 CM
BH CV ECHO MEAS - IVSD: 1.1 CM
BH CV ECHO MEAS - LA DIMENSION: 3.5 CM
BH CV ECHO MEAS - LAT PEAK E' VEL: 14.1 CM/SEC
BH CV ECHO MEAS - LV DIASTOLIC VOL/BSA (35-75): 31.9 CM2
BH CV ECHO MEAS - LV MASS(C)D: 152.6 GRAMS
BH CV ECHO MEAS - LV MAX PG: 4.7 MMHG
BH CV ECHO MEAS - LV MEAN PG: 2.7 MMHG
BH CV ECHO MEAS - LV SYSTOLIC VOL/BSA (12-30): 8.5 CM2
BH CV ECHO MEAS - LV V1 MAX: 108.3 CM/SEC
BH CV ECHO MEAS - LV V1 VTI: 21.5 CM
BH CV ECHO MEAS - LVIDD: 4.3 CM
BH CV ECHO MEAS - LVIDS: 2.5 CM
BH CV ECHO MEAS - LVOT AREA: 3.1 CM2
BH CV ECHO MEAS - LVOT DIAM: 2 CM
BH CV ECHO MEAS - LVPWD: 1 CM
BH CV ECHO MEAS - MED PEAK E' VEL: 8.2 CM/SEC
BH CV ECHO MEAS - MV A MAX VEL: 75.5 CM/SEC
BH CV ECHO MEAS - MV DEC SLOPE: 360 CM/SEC2
BH CV ECHO MEAS - MV DEC TIME: 0.28 SEC
BH CV ECHO MEAS - MV E MAX VEL: 71.8 CM/SEC
BH CV ECHO MEAS - MV E/A: 0.95
BH CV ECHO MEAS - MV MAX PG: 4.6 MMHG
BH CV ECHO MEAS - MV MEAN PG: 2 MMHG
BH CV ECHO MEAS - MV P1/2T: 83.8 MSEC
BH CV ECHO MEAS - MV V2 VTI: 29.4 CM
BH CV ECHO MEAS - MVA(P1/2T): 2.6 CM2
BH CV ECHO MEAS - MVA(VTI): 2.29 CM2
BH CV ECHO MEAS - PA ACC TIME: 0.15 SEC
BH CV ECHO MEAS - SV(LVOT): 67.4 ML
BH CV ECHO MEAS - SV(MOD-SP2): 38.7 ML
BH CV ECHO MEAS - SV(MOD-SP4): 34.8 ML
BH CV ECHO MEAS - SVI(LVOT): 45.3 ML/M2
BH CV ECHO MEAS - SVI(MOD-SP2): 26 ML/M2
BH CV ECHO MEAS - SVI(MOD-SP4): 23.4 ML/M2
BH CV ECHO MEAS - TAPSE (>1.6): 1.97 CM
BH CV ECHO MEAS - TR MAX PG: 15.8 MMHG
BH CV ECHO MEAS - TR MAX VEL: 198.5 CM/SEC
BH CV ECHO MEASUREMENTS AVERAGE E/E' RATIO: 6.44
BH CV VAS BP RIGHT ARM: NORMAL MMHG
BH CV XLRA - RV BASE: 2.1 CM
BH CV XLRA - RV LENGTH: 4.6 CM
BH CV XLRA - RV MID: 1.7 CM
BH CV XLRA - TDI S': 10.7 CM/SEC
LEFT ATRIUM VOLUME INDEX: 16.9 ML/M2
LV EF 2D ECHO EST: 65 %
LV EF BIPLANE MOD: 72.4 %

## 2025-06-02 PROCEDURE — 93306 TTE W/DOPPLER COMPLETE: CPT

## 2025-06-02 PROCEDURE — 93306 TTE W/DOPPLER COMPLETE: CPT | Performed by: INTERNAL MEDICINE

## 2025-06-16 ENCOUNTER — OFFICE VISIT (OUTPATIENT)
Dept: CARDIOLOGY | Facility: CLINIC | Age: 70
End: 2025-06-16
Payer: MEDICARE

## 2025-06-16 VITALS
SYSTOLIC BLOOD PRESSURE: 118 MMHG | HEIGHT: 58 IN | WEIGHT: 123 LBS | HEART RATE: 77 BPM | OXYGEN SATURATION: 99 % | DIASTOLIC BLOOD PRESSURE: 62 MMHG | BODY MASS INDEX: 25.82 KG/M2

## 2025-06-16 DIAGNOSIS — Z95.2 HISTORY OF TRANSCATHETER AORTIC VALVE REPLACEMENT (TAVR): Primary | ICD-10-CM

## 2025-06-16 DIAGNOSIS — E78.2 MIXED HYPERLIPIDEMIA: ICD-10-CM

## 2025-06-16 DIAGNOSIS — I10 ESSENTIAL HYPERTENSION: ICD-10-CM

## 2025-06-16 DIAGNOSIS — R09.89 BILATERAL CAROTID BRUITS: ICD-10-CM

## 2025-06-16 PROCEDURE — 99214 OFFICE O/P EST MOD 30 MIN: CPT | Performed by: NURSE PRACTITIONER

## 2025-06-16 PROCEDURE — 3074F SYST BP LT 130 MM HG: CPT | Performed by: NURSE PRACTITIONER

## 2025-06-16 PROCEDURE — G2211 COMPLEX E/M VISIT ADD ON: HCPCS | Performed by: NURSE PRACTITIONER

## 2025-06-16 PROCEDURE — 1159F MED LIST DOCD IN RCRD: CPT | Performed by: NURSE PRACTITIONER

## 2025-06-16 PROCEDURE — 3078F DIAST BP <80 MM HG: CPT | Performed by: NURSE PRACTITIONER

## 2025-06-16 PROCEDURE — 1160F RVW MEDS BY RX/DR IN RCRD: CPT | Performed by: NURSE PRACTITIONER

## 2025-06-16 NOTE — PROGRESS NOTES
Subjective:     Encounter Date:06/16/2025    Primary Care Physician: Alia Vasquez APRN      Patient ID: Greta Marino is a 70 y.o. female.    Chief Complaint:Aortic valve disorder, Dizziness, and Edema (Bilateral legs)      PROBLEM LIST:  Aortic stenosis   Echo, 05/11/2016: EF 70%. LV has mild concentric hypertrophy. AV was trileaflet. Moderate stenosis mean 20-25 mmHg, max 45-50 mmHg. Moderate AR, MR, and TR. LA mildly dilated.   Echo, 08/17/2020: LVEF 66%. Moderate AI and severe AS with mean gradient 42 mmHg.  Mild MR and TR.  RVSP 23  mmHg. Grade 1 diastolic dysfunction\  Echo, 04/26/2021: EF 60%. Severe aortic valve stenosis is present. Aortic valve area is 0.8 cm2. AV max pressure gradient 76 mmHg, mean 39 mmHg. Mild to moderate AR. Mild MR.  LHC, 06/30/2022 LHC 40% RCA with normal IFR.  Otherwise normal coronaries.  SOURAV, 06/30/2022, Dr. Camara EF 60%.  Bicuspid aortic valve with severe aortic valve stenosis.  TAVR, 07/28/2022: 23 mm Nicolas PALMER III tissue valve  Echo, 08/23/2022: EF 60%.  TAVR valve present.  Mild perivalvular regurgitation.  Mild to moderate MR.  Echo, 08/31/2023: EF 65%. There is a TAVR valve present. Mild paravalvular regurgitation is present in the prosthetic aortic valve.   Echo, 04/08/2024: EF 70%. There is a TAVR valve present. Mild paravalvular regurgitation is present in the prosthetic aortic valve. Aortic valve maximum pressure gradient is 23 mmHg. Aortic valve mean pressure gradient is 12 mmHg. Mild MR.  6/2/2025 echo EF 65%.  TAVR valve present with moderate perivalvular regurgitation.  Mean pressure gradient of 9 mmHg.  Mild MR.  Hypertension  Hyperlipidemia.   Arthritis, rheumatoid  Asthma  Nephrolithiasis   Surgeries:  Ankle surgery, left  Breast biopsy  Knee surgery, right  cytoscopy with stent removal, 2023       Allergies   Allergen Reactions    Peanut-Containing Drug Products Hives, Shortness Of Breath and Swelling         Current Outpatient Medications:      alendronate (FOSAMAX) 70 MG tablet, Take 1 tablet by mouth Every 7 (Seven) Days., Disp: 4 tablet, Rfl: 11    aspirin 81 MG EC tablet, Take 1 tablet by mouth Daily., Disp: 100 tablet, Rfl: 3    benazepril (LOTENSIN) 40 MG tablet, Take 1 tablet by mouth Daily., Disp: 90 tablet, Rfl: 1    Cholecalciferol (Vitamin D3) 10 MCG (400 UNIT) capsule, Take 4,000 Units by mouth Daily., Disp: , Rfl:     meloxicam (MOBIC) 7.5 MG tablet, Take 1 tablet by mouth Daily As Needed for Mild Pain., Disp: 30 tablet, Rfl: 5    NIFEdipine CC (ADALAT CC) 60 MG 24 hr tablet, TAKE 1 TABLET BY MOUTH DAILY, Disp: 90 tablet, Rfl: 1    rosuvastatin (CRESTOR) 40 MG tablet, Take 1 tablet by mouth Daily., Disp: 90 tablet, Rfl: 1        History of Present Illness    Patient is a 70-year-old  female who presents today for annual follow-up of valvular heart disease and cardiac risk factors.  Since last being seen patient notes overall doing well from cardiac standpoint.  She denies any chest pain, pressure, tightness.  Denies any increasing shortness of breath.  No reported syncope, presyncope, or edema.  Notes that she was recently diagnosed with rheumatoid arthritis.  Notes some limitations with activity but predominantly due to her arthritis.  Echocardiogram was recently performed with results as noted above.    The following portions of the patient's history were reviewed and updated as appropriate: allergies, current medications, past family history, past medical history, past social history, past surgical history and problem list.      Social History     Tobacco Use    Smoking status: Never     Passive exposure: Never    Smokeless tobacco: Never   Vaping Use    Vaping status: Never Used   Substance Use Topics    Alcohol use: Yes     Alcohol/week: 1.0 standard drink of alcohol     Types: 1 Glasses of wine per week     Comment: 1-2 every 6 months or so if that    Drug use: Never         ROS       Objective:   /62 (BP Location: Left  "arm, Patient Position: Sitting)   Pulse 77   Ht 147.3 cm (58\")   Wt 55.8 kg (123 lb)   SpO2 99%   BMI 25.71 kg/m²         Vitals reviewed.   Constitutional:       Appearance: Healthy appearance. Well-developed and not in distress.   Neck:      Vascular: No JVD.      Trachea: No tracheal deviation.      Comments: Bilateral bruits left greater than right  Pulmonary:      Effort: Pulmonary effort is normal.      Breath sounds: Normal breath sounds.   Cardiovascular:      Normal rate. Regular rhythm.      Murmurs: There is a grade 3/6 systolic murmur.   Pulses:     Intact distal pulses.   Edema:     Peripheral edema absent.   Musculoskeletal:         General: No deformity. Neurological:      Mental Status: Alert and oriented to person, place, and time.         Procedures          Assessment:   Assessment & Plan      Diagnoses and all orders for this visit:    1. History of transcatheter aortic valve replacement (TAVR) (Primary), stable.  Noted moderate perivalvular leak by recent echocardiogram.  Asymptomatic.    2. Essential hypertension, well-controlled.  On benazepril.    3. Mixed hyperlipidemia, stable.  Labs with primary care.  On statin.    4. Bilateral carotid bruits, last evaluated in 2022 with normal findings.      Plan:  Overall, patient is stable from a cardiac standpoint.  Reviewed echocardiogram results with her in the office today.  Continue current cardiac medications.  Follow-up in 1 years time with an echocardiogram and carotid duplex or sooner if needed.       Karma SOUZA     Advance Care Planning   ACP discussion was held with the patient during this visit. Patient does not have an advance directive, declines further assistance.        Dictated utilizing Dragon dictation  "

## 2025-06-16 NOTE — LETTER
June 16, 2025     LIBBY López  [address] [facility]  [address]  [facility] [address]    Patient: Greta Marino   YOB: 1955   Date of Visit: 6/16/2025     Dear LIBBY López:       Thank you for referring Greta Marino to me for evaluation. Below are the relevant portions of my assessment and plan of care.    If you have questions, please do not hesitate to call me. I look forward to following Greta along with you.         Sincerely,        LIBBY Dukes        CC: No Recipients    Karma Biggs APRN  06/16/25 1129  Sign when Signing Visit  Subjective:     Encounter Date:06/16/2025    Primary Care Physician: Alia Vasquez APRN      Patient ID: Greta Marino is a 70 y.o. female.    Chief Complaint:Aortic valve disorder, Dizziness, and Edema (Bilateral legs)      PROBLEM LIST:  Aortic stenosis   Echo, 05/11/2016: EF 70%. LV has mild concentric hypertrophy. AV was trileaflet. Moderate stenosis mean 20-25 mmHg, max 45-50 mmHg. Moderate AR, MR, and TR. LA mildly dilated.   Echo, 08/17/2020: LVEF 66%. Moderate AI and severe AS with mean gradient 42 mmHg.  Mild MR and TR.  RVSP 23  mmHg. Grade 1 diastolic dysfunction\  Echo, 04/26/2021: EF 60%. Severe aortic valve stenosis is present. Aortic valve area is 0.8 cm2. AV max pressure gradient 76 mmHg, mean 39 mmHg. Mild to moderate AR. Mild MR.  LHC, 06/30/2022 LHC 40% RCA with normal IFR.  Otherwise normal coronaries.  SOURAV, 06/30/2022, Dr. Camara EF 60%.  Bicuspid aortic valve with severe aortic valve stenosis.  TAVR, 07/28/2022: 23 mm Nicolas PALMER III tissue valve  Echo, 08/23/2022: EF 60%.  TAVR valve present.  Mild perivalvular regurgitation.  Mild to moderate MR.  Echo, 08/31/2023: EF 65%. There is a TAVR valve present. Mild paravalvular regurgitation is present in the prosthetic aortic valve.   Echo, 04/08/2024: EF 70%. There is a TAVR valve present. Mild paravalvular regurgitation is present in the prosthetic  aortic valve. Aortic valve maximum pressure gradient is 23 mmHg. Aortic valve mean pressure gradient is 12 mmHg. Mild MR.  6/2/2025 echo EF 65%.  TAVR valve present with moderate perivalvular regurgitation.  Mean pressure gradient of 9 mmHg.  Mild MR.  Hypertension  Hyperlipidemia.   Arthritis, rheumatoid  Asthma  Nephrolithiasis   Surgeries:  Ankle surgery, left  Breast biopsy  Knee surgery, right  cytoscopy with stent removal, 2023       Allergies   Allergen Reactions   • Peanut-Containing Drug Products Hives, Shortness Of Breath and Swelling         Current Outpatient Medications:   •  alendronate (FOSAMAX) 70 MG tablet, Take 1 tablet by mouth Every 7 (Seven) Days., Disp: 4 tablet, Rfl: 11  •  aspirin 81 MG EC tablet, Take 1 tablet by mouth Daily., Disp: 100 tablet, Rfl: 3  •  benazepril (LOTENSIN) 40 MG tablet, Take 1 tablet by mouth Daily., Disp: 90 tablet, Rfl: 1  •  Cholecalciferol (Vitamin D3) 10 MCG (400 UNIT) capsule, Take 4,000 Units by mouth Daily., Disp: , Rfl:   •  meloxicam (MOBIC) 7.5 MG tablet, Take 1 tablet by mouth Daily As Needed for Mild Pain., Disp: 30 tablet, Rfl: 5  •  NIFEdipine CC (ADALAT CC) 60 MG 24 hr tablet, TAKE 1 TABLET BY MOUTH DAILY, Disp: 90 tablet, Rfl: 1  •  rosuvastatin (CRESTOR) 40 MG tablet, Take 1 tablet by mouth Daily., Disp: 90 tablet, Rfl: 1        History of Present Illness    Patient is a 70-year-old  female who presents today for annual follow-up of valvular heart disease and cardiac risk factors.  Since last being seen patient notes overall doing well from cardiac standpoint.  She denies any chest pain, pressure, tightness.  Denies any increasing shortness of breath.  No reported syncope, presyncope, or edema.  Notes that she was recently diagnosed with rheumatoid arthritis.  Notes some limitations with activity but predominantly due to her arthritis.  Echocardiogram was recently performed with results as noted above.    The following portions of the patient's  "history were reviewed and updated as appropriate: allergies, current medications, past family history, past medical history, past social history, past surgical history and problem list.      Social History     Tobacco Use   • Smoking status: Never     Passive exposure: Never   • Smokeless tobacco: Never   Vaping Use   • Vaping status: Never Used   Substance Use Topics   • Alcohol use: Yes     Alcohol/week: 1.0 standard drink of alcohol     Types: 1 Glasses of wine per week     Comment: 1-2 every 6 months or so if that   • Drug use: Never         ROS       Objective:   /62 (BP Location: Left arm, Patient Position: Sitting)   Pulse 77   Ht 147.3 cm (58\")   Wt 55.8 kg (123 lb)   SpO2 99%   BMI 25.71 kg/m²         Vitals reviewed.   Constitutional:       Appearance: Healthy appearance. Well-developed and not in distress.   Neck:      Vascular: No JVD.      Trachea: No tracheal deviation.      Comments: Bilateral bruits left greater than right  Pulmonary:      Effort: Pulmonary effort is normal.      Breath sounds: Normal breath sounds.   Cardiovascular:      Normal rate. Regular rhythm.      Murmurs: There is a grade 3/6 systolic murmur.   Pulses:     Intact distal pulses.   Edema:     Peripheral edema absent.   Musculoskeletal:         General: No deformity. Neurological:      Mental Status: Alert and oriented to person, place, and time.         Procedures          Assessment:   Assessment & Plan     Diagnoses and all orders for this visit:    1. History of transcatheter aortic valve replacement (TAVR) (Primary), stable.  Noted moderate perivalvular leak by recent echocardiogram.  Asymptomatic.    2. Essential hypertension, well-controlled.  On benazepril.    3. Mixed hyperlipidemia, stable.  Labs with primary care.  On statin.    4. Bilateral carotid bruits, last evaluated in 2022 with normal findings.      Plan:  Overall, patient is stable from a cardiac standpoint.  Reviewed echocardiogram results with " her in the office today.  Continue current cardiac medications.  Follow-up in 1 years time with an echocardiogram and carotid duplex or sooner if needed.       Karma SOUZA     Advance Care Planning  ACP discussion was held with the patient during this visit. Patient does not have an advance directive, declines further assistance.        Dictated utilizing Dragon dictation

## 2025-07-01 ENCOUNTER — OFFICE VISIT (OUTPATIENT)
Dept: GASTROENTEROLOGY | Facility: CLINIC | Age: 70
End: 2025-07-01
Payer: MEDICARE

## 2025-07-01 VITALS
SYSTOLIC BLOOD PRESSURE: 118 MMHG | OXYGEN SATURATION: 96 % | WEIGHT: 121.8 LBS | DIASTOLIC BLOOD PRESSURE: 64 MMHG | TEMPERATURE: 97.3 F | HEIGHT: 58 IN | BODY MASS INDEX: 25.57 KG/M2 | HEART RATE: 100 BPM

## 2025-07-01 DIAGNOSIS — K86.2 PANCREATIC CYST: ICD-10-CM

## 2025-07-01 DIAGNOSIS — R93.2 ABNORMAL FINDINGS ON DIAGNOSTIC IMAGING OF LIVER AND BILIARY TRACT: ICD-10-CM

## 2025-07-01 DIAGNOSIS — R94.5 ABNORMAL RESULTS OF LIVER FUNCTION STUDIES: ICD-10-CM

## 2025-07-01 DIAGNOSIS — Z11.59 ENCOUNTER FOR SCREENING FOR OTHER VIRAL DISEASES: ICD-10-CM

## 2025-07-01 DIAGNOSIS — R79.89 ELEVATED LFTS: Primary | ICD-10-CM

## 2025-07-01 PROCEDURE — 1159F MED LIST DOCD IN RCRD: CPT | Performed by: NURSE PRACTITIONER

## 2025-07-01 PROCEDURE — 3074F SYST BP LT 130 MM HG: CPT | Performed by: NURSE PRACTITIONER

## 2025-07-01 PROCEDURE — 1160F RVW MEDS BY RX/DR IN RCRD: CPT | Performed by: NURSE PRACTITIONER

## 2025-07-01 PROCEDURE — 3078F DIAST BP <80 MM HG: CPT | Performed by: NURSE PRACTITIONER

## 2025-07-01 PROCEDURE — 99214 OFFICE O/P EST MOD 30 MIN: CPT | Performed by: NURSE PRACTITIONER

## 2025-07-01 NOTE — PROGRESS NOTES
Follow Up      Date: 2025   Patient Name: Greta Marino  : 1955   MRN: [id number]     Chief Complaint:    Chief Complaint   Patient presents with    Elevated Hepatic Enzymes       History of Present Illness: Greta Marino is a 70 y.o. female who is here today for elevated LFTs. Last seen in clinic by LIBBY Felton on 22 for follow up on an incidentally found pancreatic cysts. Her liver enzymes have been mildly elevated since 2024. The first time her LFTs were elevated, was on 2023. Rosuvastatin (Crestor) was started 1/10/2023. Prior to this, she was on Simvastatin.     She started Humira for RA last /winter and was on the medication for 6 mo. Her joint pain did not improve and she stopped the medication around 3 mo ago. She is unsure if the elevated LFTs are related to this.     Denies any family history of liver disease. She does not drink alcohol. No history of alcohol abuse. Does not take Tylenol or NSAIDs. However, Mobic and ASA 81 mg are on her medication list.     US Liver (2024) Moderate diffuse increased echogenicity of the liver parenchyma. Stable right upper quadrant ultrasound including previously characterized pancreatic cystic finding and mild right-sided hydronephrosis.     Colonoscopy (2024) for screening, per Dr. Bowling: Internal hemorrhoids.  One 4 mm polyp removed from the sigmoid colon.  1 polyp removed from the rectum.  Recommended repeating colonoscopy in 5 years  Path: both polyps were hyperplastic    Subjective      Review of Systems:   Review of Systems   Constitutional:  Negative for chills, fever and unexpected weight loss.   Gastrointestinal:  Negative for abdominal pain.       I have reviewed the patients family history, social history, past medical history, past surgical history and have updated it as appropriate.     Medications:     Current Outpatient Medications:     alendronate (FOSAMAX) 70 MG tablet, Take 1 tablet by  "mouth Every 7 (Seven) Days., Disp: 4 tablet, Rfl: 11    aspirin 81 MG EC tablet, Take 1 tablet by mouth Daily., Disp: 100 tablet, Rfl: 3    benazepril (LOTENSIN) 40 MG tablet, Take 1 tablet by mouth Daily., Disp: 90 tablet, Rfl: 1    Cholecalciferol (Vitamin D3) 10 MCG (400 UNIT) capsule, Take 4,000 Units by mouth Daily., Disp: , Rfl:     meloxicam (MOBIC) 7.5 MG tablet, Take 1 tablet by mouth Daily As Needed for Mild Pain., Disp: 30 tablet, Rfl: 5    NIFEdipine CC (ADALAT CC) 60 MG 24 hr tablet, TAKE 1 TABLET BY MOUTH DAILY, Disp: 90 tablet, Rfl: 1    rosuvastatin (CRESTOR) 40 MG tablet, Take 1 tablet by mouth Daily., Disp: 90 tablet, Rfl: 1    Allergies:   Allergies   Allergen Reactions    Peanut-Containing Drug Products Hives, Shortness Of Breath and Swelling         Objective     Physical Exam:  Vital Signs:   Vitals:    07/01/25 1003   BP: 118/64   Pulse: 100   Temp: 97.3 °F (36.3 °C)   TempSrc: Temporal   SpO2: 96%   Weight: 55.2 kg (121 lb 12.8 oz)   Height: 147.3 cm (57.99\")   PainSc: 0-No pain     Body mass index is 25.46 kg/m².     Metrics:   Greta Marino  reports that she has never smoked. She has never been exposed to tobacco smoke. She has never used smokeless tobacco.     Colonoscopy/Colon Cancer Screening: due 7/2029  Colonoscopy (07/08/2024) for screening, per Dr. Bowling: Internal hemorrhoids.  One 4 mm polyp removed from the sigmoid colon. 1 polyp removed from the rectum.  Recommended repeating colonoscopy in 5 years  Path: both polyps were hyperplastic     Physical Exam  Vitals and nursing note reviewed.   Constitutional:       General: She is not in acute distress.  HENT:      Head: Normocephalic and atraumatic.      Right Ear: External ear normal.      Left Ear: External ear normal.      Nose: Nose normal.      Mouth/Throat:      Mouth: Mucous membranes are moist.   Eyes:      Extraocular Movements: Extraocular movements intact.      Conjunctiva/sclera: Conjunctivae normal.      Pupils: " Pupils are equal, round, and reactive to light.   Cardiovascular:      Rate and Rhythm: Normal rate and regular rhythm.      Heart sounds: Normal heart sounds.   Pulmonary:      Effort: Pulmonary effort is normal.      Breath sounds: Normal breath sounds.   Abdominal:      General: Abdomen is flat. Bowel sounds are normal. There is no distension.      Palpations: Abdomen is soft.      Tenderness: There is no abdominal tenderness.      Hernia: No hernia is present.   Musculoskeletal:         General: Normal range of motion.      Cervical back: Normal range of motion.   Skin:     General: Skin is warm and dry.   Neurological:      General: No focal deficit present.      Mental Status: She is alert and oriented to person, place, and time.   Psychiatric:         Mood and Affect: Mood normal.         Behavior: Behavior normal.         Assessment / Plan      Assessment/Plan:   Diagnoses and all orders for this visit:    1. Elevated LFTs (Primary)  -     CBC & Differential; Future  -     Comprehensive Metabolic Panel; Future  -     Celiac Disease Panel; Future  -     Hepatitis B Surface Antibody; Future  -     Mitochondrial Antibodies, M2; Future  -     Anti-Smooth Muscle Antibody Titer; Future  -     AFP Tumor Marker; Future  -     Protime-INR; Future  -     Iron Profile w/o Ferritin; Future  -     Alpha - 1 - Antitrypsin; Future  -     Ferritin; Future    2. Pancreatic cyst  -     MRI abdomen w wo contrast mrcp; Future  -     CBC & Differential; Future  -     Comprehensive Metabolic Panel; Future  -     Celiac Disease Panel; Future  -     Hepatitis B Surface Antibody; Future  -     Mitochondrial Antibodies, M2; Future  -     Anti-Smooth Muscle Antibody Titer; Future  -     AFP Tumor Marker; Future  -     Protime-INR; Future  -     Iron Profile w/o Ferritin; Future  -     Alpha - 1 - Antitrypsin; Future  -     Ferritin; Future    3. Encounter for screening for other viral diseases  -     Hepatitis B Surface Antibody;  Future    4. Abnormal findings on diagnostic imaging of liver and biliary tract  -     AFP Tumor Marker; Future    5. Abnormal results of liver function studies  -     Protime-INR; Future    Repeat MRCP for pancreatic cysts surveillance.    MRI will also visualize the liver, so no ultrasound ordered.   Will check liver serology to evaluate for autoimmune, infectious and hereditary causes of liver disease.   Platelets on the low end of normal at 203.  Risk factors for MASH: hyperlipidemia, hypertension. However, her BMI is 25 and she is not diabetic.     Follow Up:   Return in about 3 months (around 10/1/2025).    Plan of care reviewed with the patient at the conclusion of today's visit.  Education was provided regarding diagnosis, management, and any prescribed or recommended OTC medications.  Patient verbalized understanding of and agreement with management plan.     NOTE TO PATIENT: The 21st Century Cures Act makes medical notes like these available to patients in the interest of transparency. However, be advised this is a medical document. It is intended as peer to peer communication. It is written in medical language and may contain abbreviations or verbiage that are unfamiliar. It may appear blunt or direct. Medical documents are intended to carry relevant information, facts as evident, and the clinical opinion of the practitioner.     LIBBY Morales  [facility]

## 2025-07-10 ENCOUNTER — LAB (OUTPATIENT)
Dept: LAB | Facility: HOSPITAL | Age: 70
End: 2025-07-10
Payer: MEDICARE

## 2025-07-10 DIAGNOSIS — R94.5 ABNORMAL RESULTS OF LIVER FUNCTION STUDIES: ICD-10-CM

## 2025-07-10 DIAGNOSIS — Z11.59 ENCOUNTER FOR SCREENING FOR OTHER VIRAL DISEASES: ICD-10-CM

## 2025-07-10 DIAGNOSIS — K86.2 PANCREATIC CYST: ICD-10-CM

## 2025-07-10 DIAGNOSIS — R93.2 ABNORMAL FINDINGS ON DIAGNOSTIC IMAGING OF LIVER AND BILIARY TRACT: ICD-10-CM

## 2025-07-10 DIAGNOSIS — R79.89 ELEVATED LFTS: ICD-10-CM

## 2025-07-10 LAB
ALBUMIN SERPL-MCNC: 4.3 G/DL (ref 3.5–5.2)
ALBUMIN/GLOB SERPL: 1.5 G/DL
ALP SERPL-CCNC: 88 U/L (ref 39–117)
ALPHA-FETOPROTEIN: 6.48 NG/ML (ref 0–8.3)
ALPHA1 GLOB MFR UR ELPH: 168 MG/DL (ref 90–200)
ALT SERPL W P-5'-P-CCNC: 19 U/L (ref 1–33)
ANION GAP SERPL CALCULATED.3IONS-SCNC: 9.3 MMOL/L (ref 5–15)
AST SERPL-CCNC: 37 U/L (ref 1–32)
BASOPHILS # BLD AUTO: 0.07 10*3/MM3 (ref 0–0.2)
BASOPHILS NFR BLD AUTO: 1.1 % (ref 0–1.5)
BILIRUB SERPL-MCNC: 0.5 MG/DL (ref 0–1.2)
BUN SERPL-MCNC: 9 MG/DL (ref 8–23)
BUN/CREAT SERPL: 11.4 (ref 7–25)
CALCIUM SPEC-SCNC: 9.7 MG/DL (ref 8.6–10.5)
CHLORIDE SERPL-SCNC: 105 MMOL/L (ref 98–107)
CO2 SERPL-SCNC: 21.7 MMOL/L (ref 22–29)
CREAT SERPL-MCNC: 0.79 MG/DL (ref 0.57–1)
DEPRECATED RDW RBC AUTO: 44.3 FL (ref 37–54)
EGFRCR SERPLBLD CKD-EPI 2021: 81.1 ML/MIN/1.73
EOSINOPHIL # BLD AUTO: 0.27 10*3/MM3 (ref 0–0.4)
EOSINOPHIL NFR BLD AUTO: 4.3 % (ref 0.3–6.2)
ERYTHROCYTE [DISTWIDTH] IN BLOOD BY AUTOMATED COUNT: 12.6 % (ref 12.3–15.4)
FERRITIN SERPL-MCNC: 50.6 NG/ML (ref 13–150)
GLOBULIN UR ELPH-MCNC: 2.8 GM/DL
GLUCOSE SERPL-MCNC: 96 MG/DL (ref 65–99)
HBV SURFACE AB SER RIA-ACNC: REACTIVE
HCT VFR BLD AUTO: 42.1 % (ref 34–46.6)
HGB BLD-MCNC: 14.1 G/DL (ref 12–15.9)
IMM GRANULOCYTES # BLD AUTO: 0.01 10*3/MM3 (ref 0–0.05)
IMM GRANULOCYTES NFR BLD AUTO: 0.2 % (ref 0–0.5)
INR PPP: 0.99 (ref 0.89–1.12)
IRON 24H UR-MRATE: 70 MCG/DL (ref 37–145)
IRON SATN MFR SERPL: 19 % (ref 20–50)
LYMPHOCYTES # BLD AUTO: 1.93 10*3/MM3 (ref 0.7–3.1)
LYMPHOCYTES NFR BLD AUTO: 30.9 % (ref 19.6–45.3)
MCH RBC QN AUTO: 32.3 PG (ref 26.6–33)
MCHC RBC AUTO-ENTMCNC: 33.5 G/DL (ref 31.5–35.7)
MCV RBC AUTO: 96.6 FL (ref 79–97)
MONOCYTES # BLD AUTO: 0.42 10*3/MM3 (ref 0.1–0.9)
MONOCYTES NFR BLD AUTO: 6.7 % (ref 5–12)
NEUTROPHILS NFR BLD AUTO: 3.55 10*3/MM3 (ref 1.7–7)
NEUTROPHILS NFR BLD AUTO: 56.8 % (ref 42.7–76)
NRBC BLD AUTO-RTO: 0 /100 WBC (ref 0–0.2)
PLATELET # BLD AUTO: 199 10*3/MM3 (ref 140–450)
PMV BLD AUTO: 11.2 FL (ref 6–12)
POTASSIUM SERPL-SCNC: 4.5 MMOL/L (ref 3.5–5.2)
PROT SERPL-MCNC: 7.1 G/DL (ref 6–8.5)
PROTHROMBIN TIME: 13.6 SECONDS (ref 12.2–15.3)
RBC # BLD AUTO: 4.36 10*6/MM3 (ref 3.77–5.28)
SODIUM SERPL-SCNC: 136 MMOL/L (ref 136–145)
TIBC SERPL-MCNC: 365 MCG/DL (ref 298–536)
TRANSFERRIN SERPL-MCNC: 245 MG/DL (ref 200–360)
WBC NRBC COR # BLD AUTO: 6.25 10*3/MM3 (ref 3.4–10.8)

## 2025-07-10 PROCEDURE — 82103 ALPHA-1-ANTITRYPSIN TOTAL: CPT

## 2025-07-10 PROCEDURE — 86364 TISS TRNSGLTMNASE EA IG CLAS: CPT

## 2025-07-10 PROCEDURE — 85025 COMPLETE CBC W/AUTO DIFF WBC: CPT

## 2025-07-10 PROCEDURE — 86381 MITOCHONDRIAL ANTIBODY EACH: CPT

## 2025-07-10 PROCEDURE — 85610 PROTHROMBIN TIME: CPT

## 2025-07-10 PROCEDURE — 86231 EMA EACH IG CLASS: CPT

## 2025-07-10 PROCEDURE — 82728 ASSAY OF FERRITIN: CPT

## 2025-07-10 PROCEDURE — 83540 ASSAY OF IRON: CPT

## 2025-07-10 PROCEDURE — 36415 COLL VENOUS BLD VENIPUNCTURE: CPT

## 2025-07-10 PROCEDURE — 80053 COMPREHEN METABOLIC PANEL: CPT

## 2025-07-10 PROCEDURE — 86015 ACTIN ANTIBODY EACH: CPT

## 2025-07-10 PROCEDURE — 84466 ASSAY OF TRANSFERRIN: CPT

## 2025-07-10 PROCEDURE — 86706 HEP B SURFACE ANTIBODY: CPT

## 2025-07-10 PROCEDURE — 82784 ASSAY IGA/IGD/IGG/IGM EACH: CPT

## 2025-07-10 PROCEDURE — 82105 ALPHA-FETOPROTEIN SERUM: CPT

## 2025-07-11 LAB
ENDOMYSIUM IGA SER QL: NEGATIVE
IGA SERPL-MCNC: 223 MG/DL (ref 87–352)
MITOCHONDRIA M2 IGG SER-ACNC: <20 UNITS (ref 0–20)
SMA IGG SER-ACNC: 4 UNITS (ref 0–19)
TTG IGA SER-ACNC: <2 U/ML (ref 0–3)

## 2025-07-20 ENCOUNTER — RESULTS FOLLOW-UP (OUTPATIENT)
Dept: GASTROENTEROLOGY | Facility: CLINIC | Age: 70
End: 2025-07-20
Payer: MEDICARE

## 2025-07-20 NOTE — LETTER
Greta Marino  [address] [facility] [address]    July 21, 2025     Dear Ms. Marino:    Below are the results from your recent visit:    Resulted Orders   Comprehensive Metabolic Panel   Result Value Ref Range    Glucose 96 65 - 99 mg/dL    BUN 9.0 8.0 - 23.0 mg/dL    Creatinine 0.79 0.57 - 1.00 mg/dL    Sodium 136 136 - 145 mmol/L    Potassium 4.5 3.5 - 5.2 mmol/L    Chloride 105 98 - 107 mmol/L    CO2 21.7 (L) 22.0 - 29.0 mmol/L    Calcium 9.7 8.6 - 10.5 mg/dL    Total Protein 7.1 6.0 - 8.5 g/dL    Albumin 4.3 3.5 - 5.2 g/dL    ALT (SGPT) 19 1 - 33 U/L    AST (SGOT) 37 (H) 1 - 32 U/L    Alkaline Phosphatase 88 39 - 117 U/L    Total Bilirubin 0.5 0.0 - 1.2 mg/dL    Globulin 2.8 gm/dL    A/G Ratio 1.5 g/dL    BUN/Creatinine Ratio 11.4 7.0 - 25.0    Anion Gap 9.3 5.0 - 15.0 mmol/L    eGFR 81.1 >60.0 mL/min/1.73   Celiac Disease Panel   Result Value Ref Range    Endomysial IgA Negative Negative    Tissue Transglutaminase IgA <2 0 - 3 U/mL      Comment:                                    Negative        0 -  3                                Weak Positive   4 - 10                                Positive           >10   Tissue Transglutaminase (tTG) has been identified   as the endomysial antigen.  Studies have demonstr-   ated that endomysial IgA antibodies have over 99%   specificity for gluten sensitive enteropathy.    IgA 223 87 - 352 mg/dL   Hepatitis B Surface Antibody   Result Value Ref Range    Hep B S Ab Reactive    Mitochondrial Antibodies, M2   Result Value Ref Range    Mitochondrial Ab <20.0 0.0 - 20.0 Units      Comment:                                      Negative    0.0 - 20.0                                  Equivocal  20.1 - 24.9                                  Positive         >24.9  Mitochondrial (M2) Antibodies are found in 90-96% of  patients with primary biliary cirrhosis.   Anti-Smooth Muscle Antibody Titer   Result Value Ref Range    Smooth Muscle Ab 4 0 - 19 Units      Comment:                        Negative                     0 - 19                   Weak positive               20 - 30                   Moderate to strong positive     >30   Actin Antibodies are found in 52-85% of patients with   autoimmune hepatitis or chronic active hepatitis and   in 22% of patients with primary biliary cirrhosis.   AFP Tumor Marker   Result Value Ref Range    ALPHA-FETOPROTEIN 6.48 0 - 8.3 ng/mL   Protime-INR   Result Value Ref Range    Protime 13.6 12.2 - 15.3 Seconds    INR 0.99 0.89 - 1.12   Iron Profile w/o Ferritin   Result Value Ref Range    Iron 70 37 - 145 mcg/dL    Iron Saturation (TSAT) 19 (L) 20 - 50 %    Transferrin 245 200 - 360 mg/dL    TIBC 365 298 - 536 mcg/dL   Alpha - 1 - Antitrypsin   Result Value Ref Range    ALPHA -1 ANTITRYPSIN 168 90 - 200 mg/dL   Ferritin   Result Value Ref Range    Ferritin 50.60 13.00 - 150.00 ng/mL   CBC Auto Differential   Result Value Ref Range    WBC 6.25 3.40 - 10.80 10*3/mm3    RBC 4.36 3.77 - 5.28 10*6/mm3    Hemoglobin 14.1 12.0 - 15.9 g/dL    Hematocrit 42.1 34.0 - 46.6 %    MCV 96.6 79.0 - 97.0 fL    MCH 32.3 26.6 - 33.0 pg    MCHC 33.5 31.5 - 35.7 g/dL    RDW 12.6 12.3 - 15.4 %    RDW-SD 44.3 37.0 - 54.0 fl    MPV 11.2 6.0 - 12.0 fL    Platelets 199 140 - 450 10*3/mm3    Neutrophil % 56.8 42.7 - 76.0 %    Lymphocyte % 30.9 19.6 - 45.3 %    Monocyte % 6.7 5.0 - 12.0 %    Eosinophil % 4.3 0.3 - 6.2 %    Basophil % 1.1 0.0 - 1.5 %    Immature Grans % 0.2 0.0 - 0.5 %    Neutrophils, Absolute 3.55 1.70 - 7.00 10*3/mm3    Lymphocytes, Absolute 1.93 0.70 - 3.10 10*3/mm3    Monocytes, Absolute 0.42 0.10 - 0.90 10*3/mm3    Eosinophils, Absolute 0.27 0.00 - 0.40 10*3/mm3    Basophils, Absolute 0.07 0.00 - 0.20 10*3/mm3    Immature Grans, Absolute 0.01 0.00 - 0.05 10*3/mm3    nRBC 0.0 0.0 - 0.2 /100 WBC     Your labs look really good. Your AST remains mildly elevated, and your iron saturation is a tick low, but labs are without any other evidence of anemia.  Celiac panel is negative. No evidence of autoimmune, infectious or hereditary liver disease on labs. LFT pattern c/w PUGH.     MELD 7/10/25: 8     Celiac Disease Panel; Anti-Smooth Muscle Antibody Titer; Mitochondrial Antibodies, M2; Ferritin; AFP Tumor Marker (6 more orders)    If you have any questions or concerns, please don't hesitate to call.         Sincerely,        LIBBY Hunter

## 2025-07-22 ENCOUNTER — HOSPITAL ENCOUNTER (OUTPATIENT)
Dept: MRI IMAGING | Facility: HOSPITAL | Age: 70
Discharge: HOME | End: 2025-07-22
Admitting: NURSE PRACTITIONER
Payer: MEDICARE

## 2025-07-22 DIAGNOSIS — K86.2 PANCREATIC CYST: ICD-10-CM

## 2025-07-22 PROCEDURE — A9577 INJ MULTIHANCE: HCPCS | Performed by: NURSE PRACTITIONER

## 2025-07-22 PROCEDURE — 25510000002 GADOBENATE DIMEGLUMINE 529 MG/ML SOLUTION: Performed by: NURSE PRACTITIONER

## 2025-07-22 PROCEDURE — 74183 MRI ABD W/O CNTR FLWD CNTR: CPT

## 2025-07-22 RX ADMIN — GADOBENATE DIMEGLUMINE 12 ML: 529 INJECTION, SOLUTION INTRAVENOUS at 15:09

## 2025-07-23 ENCOUNTER — OFFICE VISIT (OUTPATIENT)
Dept: UROLOGY | Facility: CLINIC | Age: 70
End: 2025-07-23
Payer: MEDICARE

## 2025-07-23 VITALS — WEIGHT: 120 LBS | HEIGHT: 58 IN | BODY MASS INDEX: 25.19 KG/M2

## 2025-07-23 DIAGNOSIS — N13.5 URETEROPELVIC JUNCTION (UPJ) OBSTRUCTION, RIGHT: Primary | ICD-10-CM

## 2025-07-23 PROCEDURE — 99213 OFFICE O/P EST LOW 20 MIN: CPT | Performed by: UROLOGY

## 2025-07-23 PROCEDURE — 1159F MED LIST DOCD IN RCRD: CPT | Performed by: UROLOGY

## 2025-07-23 PROCEDURE — 1160F RVW MEDS BY RX/DR IN RCRD: CPT | Performed by: UROLOGY

## (undated) DEVICE — PATIENT RETURN ELECTRODE, SINGLE-USE, CONTACT QUALITY MONITORING, ADULT, WITH 9FT CORD, FOR PATIENTS WEIGING OVER 33LBS. (15KG): Brand: MEGADYNE

## (undated) DEVICE — GLV SURG SENSICARE PI MIC PF SZ7.5 LF STRL

## (undated) DEVICE — ELECTRD DEFIB M/FUNC PROPADZ STRL 2PK

## (undated) DEVICE — CATH DIAG EXPO .056 AL1 6F 100CM

## (undated) DEVICE — BLANKT WARM UPPR/BDY ARM/OUT 57X196CM

## (undated) DEVICE — SYR LUERLOK 30CC

## (undated) DEVICE — DRAPE,UNDERBUTTOCKS,PCH,STERILE: Brand: MEDLINE

## (undated) DEVICE — CANNULA SEAL

## (undated) DEVICE — MODEL AT P65, P/N 701554-001KIT CONTENTS: HAND CONTROLLER, 3-WAY HIGH-PRESSURE STOPCOCK WITH ROTATING END AND PREMIUM HIGH-PRESSURE TUBING: Brand: ANGIOTOUCH® KIT

## (undated) DEVICE — PINNACLE INTRODUCER SHEATH: Brand: PINNACLE

## (undated) DEVICE — BOWL UTIL STRL 32OZ

## (undated) DEVICE — GW PERIPH VASC ADX J/TP SS .035 150CM 3MM

## (undated) DEVICE — NDL PERC 1PRT THNWALL W/BASEPLT 18G 7CM

## (undated) DEVICE — SUT MNCRYL 4/0 PS2 18 IN

## (undated) DEVICE — GLIDEX™ COATED HYDROPHILIC GUIDEWIRE: Brand: MAGIC TORQUE™

## (undated) DEVICE — ENDOPATH XCEL BLADELESS TROCARS WITH STABILITY SLEEVES: Brand: ENDOPATH XCEL

## (undated) DEVICE — GLIDESHEATH BASIC HYDROPHILIC COATED INTRODUCER SHEATH: Brand: GLIDESHEATH

## (undated) DEVICE — SUT SILK 4/0 TIES 18IN A183H

## (undated) DEVICE — PK CYSTO-TUR BASIC 10

## (undated) DEVICE — ANTIBACTERIAL UNDYED BRAIDED (POLYGLACTIN 910), SYNTHETIC ABSORBABLE SUTURE: Brand: COATED VICRYL

## (undated) DEVICE — TBG PENCL TELESCP MEGADYNE SMOKE EVAC 10FT

## (undated) DEVICE — SYR LUERLOK 50ML

## (undated) DEVICE — 3M™ STERI-DRAPE™ FLUOROSCOPE DRAPE, 10 PER CARTON / 4 CARTONS PER CASE, 1012: Brand: STERI-DRAPE™

## (undated) DEVICE — INTENDED FOR TISSUE SEPARATION, AND OTHER PROCEDURES THAT REQUIRE A SHARP SURGICAL BLADE TO PUNCTURE OR CUT.: Brand: BARD-PARKER ® STAINLESS STEEL BLADES

## (undated) DEVICE — DEV COMP RAD PRELUDESYNC 24CM

## (undated) DEVICE — CATH DIAG EXPO M/ PK 6FR FL4/FR4 PIG 3PK

## (undated) DEVICE — MODEL BT2000 P/N 700287-012KIT CONTENTS: MANIFOLD WITH SALINE AND CONTRAST PORTS, SALINE TUBING WITH SPIKE AND HAND SYRINGE, TRANSDUCER: Brand: BT2000 AUTOMATED MANIFOLD KIT

## (undated) DEVICE — LAPAROVUE VISIBILITY SYSTEM LAPAROSCOPIC SOLUTIONS: Brand: LAPAROVUE

## (undated) DEVICE — SUT MONOCRYL PLS ANTIB UND 3/0  PS1 27IN

## (undated) DEVICE — TREK CORONARY DILATATION CATHETER 3.50 MM X 12 MM / RAPID-EXCHANGE: Brand: TREK

## (undated) DEVICE — CABL PACE ATRIAL PT BLU

## (undated) DEVICE — GUIDE CATHETER: Brand: MACH1™

## (undated) DEVICE — PROVIDES A STERILE INTERFACE BETWEEN THE OPERATING ROOM SURGICAL LAMPS (NON-STERILE) AND THE SURGEON OR NURSE (STERILE).: Brand: STERION®CLAMP COVER FABRIC

## (undated) DEVICE — COVER,MAYO STAND,XL,STERILE: Brand: MEDLINE

## (undated) DEVICE — KT MANIFOLD CATHLAB CUST

## (undated) DEVICE — PK CATH CARD 10

## (undated) DEVICE — DRAPE,TOP,102X53,STERILE: Brand: MEDLINE

## (undated) DEVICE — TRY DRN PNEUMO WAYNE ST 14F 19SD/PRT 29CM W/SUT

## (undated) DEVICE — COLUMN DRAPE

## (undated) DEVICE — SHEET, DRAPE, SPLIT, STERILE: Brand: MEDLINE

## (undated) DEVICE — PENCL ROCKRSWCH MEGADYNE W/HOLSTR 10FT SS

## (undated) DEVICE — CATH DIAG EXPO .056 FL3.5 6F 100CM

## (undated) DEVICE — SUT PROLN 4/0 BB D/A 36IN 8581H

## (undated) DEVICE — 3M™ IOBAN™ 2 ANTIMICROBIAL INCISE DRAPE 6651EZ: Brand: IOBAN™ 2

## (undated) DEVICE — GLV SURG BIOGEL LTX PF 7 1/2

## (undated) DEVICE — GW PERIPH GUIDERIGHT STD/EXCHNG/J/TIP SS 0.035IN 5X260CM

## (undated) DEVICE — SUT SILK 0 SH 30IN K834H

## (undated) DEVICE — SUT SILK 3/0 TIES 18IN A184H

## (undated) DEVICE — BOOT SUT XRAY DETECT STD YEL/BLU CA/50

## (undated) DEVICE — PAD ARMBRD SURG CONVOL 7.5X20X2IN

## (undated) DEVICE — BALN DIL TYSHAK/X 18MM 5X102

## (undated) DEVICE — HI-TORQUE VERSATURN F GUIDE WIRE FULLY COATED .014 STRAIGHT TIP 190 CM: Brand: HI-TORQUE VERSATURN

## (undated) DEVICE — ARM DRAPE

## (undated) DEVICE — ST TBG CONN PNEUMOCLEAR EVAC SMOKE HEAT/HUMID

## (undated) DEVICE — CATH GUIDE BERN 5F 65CM

## (undated) DEVICE — SUT PROLN 2/0 PC3 8833H

## (undated) DEVICE — SUT PROLN 7/0 BV1 24IN 4PK M8702

## (undated) DEVICE — ANGIOGRAPHIC CATHETER: Brand: EXPO™

## (undated) DEVICE — RADIFOCUS GLIDEWIRE: Brand: GLIDEWIRE

## (undated) DEVICE — PK UROL DAVINCI 10

## (undated) DEVICE — COVER,TABLE,HVY DUTY,60"X90",STRL: Brand: MEDLINE

## (undated) DEVICE — CATH PACE PACEL BIPOL 5F110CM

## (undated) DEVICE — SUT VIC 0 TIES 18IN J912G

## (undated) DEVICE — Device: Brand: OMNIWIRE PRESSURE GUIDE WIRE

## (undated) DEVICE — PERCLOSE™ PROSTYLE™ SUTURE-MEDIATED CLOSURE AND REPAIR SYSTEM: Brand: PERCLOSE™ PROSTYLE™

## (undated) DEVICE — CLTH CLENS READYCLEANSE PERI CARE PK/5

## (undated) DEVICE — TRAP FLD MINIVAC MEGADYNE 100ML

## (undated) DEVICE — SUCTION CANISTER 2500CC: Brand: DEROYAL

## (undated) DEVICE — ENDOPATH PNEUMONEEDLE INSUFFLATION NEEDLES WITH LUER LOCK CONNECTORS 120MM: Brand: ENDOPATH

## (undated) DEVICE — ADHS SKIN PREMIERPRO EXOFIN TOPICAL HI/VISC .5ML

## (undated) DEVICE — GW AMPLTZ SUPERSTIFF STR .035IN 180CM

## (undated) DEVICE — HDRST POSTIN FM CRDL TRACH SLOT 9X8X4IN

## (undated) DEVICE — DECANTER BAG 9": Brand: MEDLINE INDUSTRIES, INC.

## (undated) DEVICE — PK MINOR SPLT 10

## (undated) DEVICE — ELECTRD BLD EZ CLN STD 2.5IN

## (undated) DEVICE — SUT PROLN 6/0 C1 D/A 30IN 8706H

## (undated) DEVICE — SUT SILK 2/0 PS 18IN 1588H

## (undated) DEVICE — 3M™ STERI-DRAPE™ INSTRUMENT POUCH 1018: Brand: STERI-DRAPE™

## (undated) DEVICE — TOTAL TRAY, 16FR 10ML SIL FOLEY, URN: Brand: MEDLINE

## (undated) DEVICE — SUT VIC 0 UR6 27IN VCP603H

## (undated) DEVICE — SUT SILK 2/0 TIES 18IN A185H

## (undated) DEVICE — SLV REPOSTNG CATH STRL 60CM

## (undated) DEVICE — GW SAFARI2 PRESH XSM CRV .035IN 3.2X2.9X275CM

## (undated) DEVICE — GW STARTER FXD CORE STR .035 3X260CM

## (undated) DEVICE — AIRLIFE™ UNIVERSAL OXYGEN NUT AND NIPPLE CONNECTION: Brand: AIRLIFE™

## (undated) DEVICE — TIP COVER ACCESSORY

## (undated) DEVICE — BLADELESS OBTURATOR: Brand: WECK VISTA

## (undated) DEVICE — CATH URETRL FLXITP POLLACK STD 5F 70CM

## (undated) DEVICE — SI AVANTI+ 7F STD W/GW  NO OBT: Brand: AVANTI

## (undated) DEVICE — NITINOL WIRE WITH HYDROPHILIC TIP: Brand: SENSOR

## (undated) DEVICE — APPL CHLORAPREP TINTED 26ML TEAL

## (undated) DEVICE — GOWN,NON-REINFORCED,SIRUS,SET IN SLV,XL: Brand: MEDLINE

## (undated) DEVICE — SENSR CERBRL O2 PK/2

## (undated) DEVICE — Device

## (undated) DEVICE — NITINOL STONE RETRIEVAL BASKET: Brand: ZERO TIP

## (undated) DEVICE — BALLOON DILATATION CATHETER: Brand: UROMAX ULTRA

## (undated) DEVICE — APL DUPLOSPRAYER MIS 40CM

## (undated) DEVICE — 3M™ IOBAN™ 2 ANTIMICROBIAL INCISE DRAPE 6650EZ: Brand: IOBAN™ 2

## (undated) DEVICE — DEV INFL MONARCH 25W

## (undated) DEVICE — GLV SURG PREMIERPRO MIC LTX PF SZ6.5 BRN

## (undated) DEVICE — BLANKT WARM UNDER/BDY A/ 100X195CM DISP LF

## (undated) DEVICE — LAP PORT CLOSURE GUIDES 5MM AND 10/12MM: Brand: LAP PORT CLOSURE GUIDES 5MM AND 10/12MM